# Patient Record
Sex: FEMALE | Race: WHITE | NOT HISPANIC OR LATINO | Employment: OTHER | URBAN - METROPOLITAN AREA
[De-identification: names, ages, dates, MRNs, and addresses within clinical notes are randomized per-mention and may not be internally consistent; named-entity substitution may affect disease eponyms.]

---

## 2018-05-22 ENCOUNTER — OFFICE VISIT CONVERTED (OUTPATIENT)
Dept: ORTHOPEDIC SURGERY | Facility: CLINIC | Age: 23
End: 2018-05-22
Attending: ORTHOPAEDIC SURGERY

## 2021-05-16 VITALS — HEIGHT: 60 IN | OXYGEN SATURATION: 98 % | HEART RATE: 77 BPM | BODY MASS INDEX: 20.22 KG/M2 | WEIGHT: 103 LBS

## 2022-01-08 ENCOUNTER — HOSPITAL ENCOUNTER (EMERGENCY)
Facility: HOSPITAL | Age: 27
Discharge: HOME | End: 2022-01-08
Attending: EMERGENCY MEDICINE | Admitting: EMERGENCY MEDICINE

## 2022-01-08 ENCOUNTER — APPOINTMENT (OUTPATIENT)
Dept: GENERAL RADIOLOGY | Facility: HOSPITAL | Age: 27
End: 2022-01-08

## 2022-01-08 ENCOUNTER — APPOINTMENT (OUTPATIENT)
Dept: CT IMAGING | Facility: HOSPITAL | Age: 27
End: 2022-01-08

## 2022-01-08 VITALS
DIASTOLIC BLOOD PRESSURE: 68 MMHG | RESPIRATION RATE: 18 BRPM | TEMPERATURE: 98 F | OXYGEN SATURATION: 97 % | WEIGHT: 144 LBS | HEART RATE: 70 BPM | BODY MASS INDEX: 30.23 KG/M2 | SYSTOLIC BLOOD PRESSURE: 108 MMHG | HEIGHT: 58 IN

## 2022-01-08 DIAGNOSIS — R68.89 DECREASED ACTIVITY: Primary | ICD-10-CM

## 2022-01-08 DIAGNOSIS — S09.90XA INJURY OF HEAD, INITIAL ENCOUNTER: ICD-10-CM

## 2022-01-08 LAB
ANION GAP SERPL CALCULATED.3IONS-SCNC: 8.5 MMOL/L (ref 5–15)
BACTERIA UR QL AUTO: ABNORMAL /HPF
BILIRUB UR QL STRIP: NEGATIVE
BUN SERPL-MCNC: 17 MG/DL (ref 6–20)
BUN/CREAT SERPL: 36.2 (ref 7–25)
CALCIUM SPEC-SCNC: 9.4 MG/DL (ref 8.6–10.5)
CHLORIDE SERPL-SCNC: 103 MMOL/L (ref 98–107)
CLARITY UR: ABNORMAL
CO2 SERPL-SCNC: 30.5 MMOL/L (ref 22–29)
COLOR UR: ABNORMAL
CREAT SERPL-MCNC: 0.47 MG/DL (ref 0.57–1)
DEPRECATED RDW RBC AUTO: 48.9 FL (ref 37–54)
EOSINOPHIL # BLD MANUAL: 0.08 10*3/MM3 (ref 0–0.4)
EOSINOPHIL NFR BLD MANUAL: 2 % (ref 0.3–6.2)
ERYTHROCYTE [DISTWIDTH] IN BLOOD BY AUTOMATED COUNT: 13.5 % (ref 12.3–15.4)
GFR SERPL CREATININE-BSD FRML MDRD: >150 ML/MIN/1.73
GLUCOSE SERPL-MCNC: 80 MG/DL (ref 65–99)
GLUCOSE UR STRIP-MCNC: NEGATIVE MG/DL
HCT VFR BLD AUTO: 30.7 % (ref 34–46.6)
HGB BLD-MCNC: 10.2 G/DL (ref 12–15.9)
HGB UR QL STRIP.AUTO: NEGATIVE
HYALINE CASTS UR QL AUTO: ABNORMAL /LPF
KETONES UR QL STRIP: ABNORMAL
LARGE PLATELETS: ABNORMAL
LARGE PLATELETS: NORMAL
LEUKOCYTE ESTERASE UR QL STRIP.AUTO: ABNORMAL
LYMPHOCYTES # BLD MANUAL: 2.6 10*3/MM3 (ref 0.7–3.1)
LYMPHOCYTES NFR BLD MANUAL: 10 % (ref 5–12)
MCH RBC QN AUTO: 32.2 PG (ref 26.6–33)
MCHC RBC AUTO-ENTMCNC: 33.2 G/DL (ref 31.5–35.7)
MCV RBC AUTO: 96.8 FL (ref 79–97)
MONOCYTES # BLD: 0.38 10*3/MM3 (ref 0.1–0.9)
NEUTROPHILS # BLD AUTO: 0.76 10*3/MM3 (ref 1.7–7)
NEUTROPHILS NFR BLD MANUAL: 20 % (ref 42.7–76)
NITRITE UR QL STRIP: NEGATIVE
PH UR STRIP.AUTO: 7.5 [PH] (ref 5–8)
PLATELET # BLD AUTO: 57 10*3/MM3 (ref 140–450)
PMV BLD AUTO: 11.1 FL (ref 6–12)
POTASSIUM SERPL-SCNC: 4.4 MMOL/L (ref 3.5–5.2)
PROT UR QL STRIP: ABNORMAL
RBC # BLD AUTO: 3.17 10*6/MM3 (ref 3.77–5.28)
RBC # UR STRIP: ABNORMAL /HPF
RBC MORPH BLD: NORMAL
REF LAB TEST METHOD: ABNORMAL
SCAN SLIDE: NORMAL
SMALL PLATELETS BLD QL SMEAR: ABNORMAL
SMALL PLATELETS BLD QL SMEAR: NORMAL
SODIUM SERPL-SCNC: 142 MMOL/L (ref 136–145)
SP GR UR STRIP: >1.03 (ref 1–1.03)
SQUAMOUS #/AREA URNS HPF: ABNORMAL /HPF
TRI-PHOS CRY URNS QL MICRO: ABNORMAL /HPF
UROBILINOGEN UR QL STRIP: ABNORMAL
VALPROATE SERPL-MCNC: 101.3 MCG/ML (ref 50–125)
VARIANT LYMPHS NFR BLD MANUAL: 68 % (ref 19.6–45.3)
WBC # UR STRIP: ABNORMAL /HPF
WBC MORPH BLD: NORMAL
WBC NRBC COR # BLD: 3.82 10*3/MM3 (ref 3.4–10.8)

## 2022-01-08 PROCEDURE — 36415 COLL VENOUS BLD VENIPUNCTURE: CPT

## 2022-01-08 PROCEDURE — 81001 URINALYSIS AUTO W/SCOPE: CPT | Performed by: NURSE PRACTITIONER

## 2022-01-08 PROCEDURE — U0004 COV-19 TEST NON-CDC HGH THRU: HCPCS | Performed by: NURSE PRACTITIONER

## 2022-01-08 PROCEDURE — 71045 X-RAY EXAM CHEST 1 VIEW: CPT

## 2022-01-08 PROCEDURE — 99283 EMERGENCY DEPT VISIT LOW MDM: CPT

## 2022-01-08 PROCEDURE — 85025 COMPLETE CBC W/AUTO DIFF WBC: CPT | Performed by: NURSE PRACTITIONER

## 2022-01-08 PROCEDURE — 80164 ASSAY DIPROPYLACETIC ACD TOT: CPT | Performed by: NURSE PRACTITIONER

## 2022-01-08 PROCEDURE — 80048 BASIC METABOLIC PNL TOTAL CA: CPT | Performed by: NURSE PRACTITIONER

## 2022-01-08 PROCEDURE — 70450 CT HEAD/BRAIN W/O DYE: CPT

## 2022-01-08 PROCEDURE — 85007 BL SMEAR W/DIFF WBC COUNT: CPT | Performed by: NURSE PRACTITIONER

## 2022-01-08 RX ORDER — METHOCARBAMOL 750 MG/1
750 TABLET, FILM COATED ORAL 2 TIMES DAILY PRN
COMMUNITY
End: 2022-06-21

## 2022-01-08 RX ORDER — DOCUSATE SODIUM 100 MG/1
100 CAPSULE, LIQUID FILLED ORAL 2 TIMES DAILY
COMMUNITY
End: 2022-06-21

## 2022-01-08 RX ORDER — DIVALPROEX SODIUM 500 MG/1
500 TABLET, DELAYED RELEASE ORAL 3 TIMES DAILY
COMMUNITY
End: 2022-06-21 | Stop reason: DRUGHIGH

## 2022-01-08 RX ORDER — ARIPIPRAZOLE 5 MG/1
5 TABLET ORAL DAILY
COMMUNITY
End: 2022-07-20 | Stop reason: SDUPTHER

## 2022-01-08 RX ORDER — ACETAMINOPHEN 500 MG
1000 TABLET ORAL EVERY 8 HOURS PRN
COMMUNITY

## 2022-01-08 RX ORDER — LEVOTHYROXINE SODIUM 0.07 MG/1
75 TABLET ORAL DAILY
COMMUNITY

## 2022-01-08 RX ORDER — LEVETIRACETAM 500 MG/1
1500 TABLET, EXTENDED RELEASE ORAL 2 TIMES DAILY
COMMUNITY

## 2022-01-08 RX ORDER — CLONAZEPAM 0.5 MG/1
0.5 TABLET ORAL AS NEEDED
COMMUNITY
End: 2022-06-21

## 2022-01-08 RX ORDER — CITALOPRAM 20 MG/1
20 TABLET ORAL DAILY
COMMUNITY
End: 2022-06-21

## 2022-01-08 RX ORDER — DIVALPROEX SODIUM 250 MG/1
750 TABLET, EXTENDED RELEASE ORAL 2 TIMES DAILY
COMMUNITY

## 2022-01-08 RX ORDER — MELOXICAM 7.5 MG/1
7.5 TABLET ORAL DAILY
COMMUNITY
End: 2022-06-21

## 2022-01-08 RX ORDER — CANNABIDIOL 100 MG/ML
1 SOLUTION ORAL 2 TIMES DAILY
COMMUNITY
End: 2022-06-21

## 2022-01-08 RX ORDER — OLOPATADINE HYDROCHLORIDE 2 MG/ML
1 SOLUTION/ DROPS OPHTHALMIC DAILY
COMMUNITY
End: 2022-06-21

## 2022-01-08 RX ORDER — CLOBAZAM 10 MG/1
TABLET ORAL TAKE AS DIRECTED
COMMUNITY

## 2022-01-09 LAB — SARS-COV-2 RNA PNL SPEC NAA+PROBE: NOT DETECTED

## 2022-01-09 NOTE — ED PROVIDER NOTES
"Juan Dorado is a 26-year-old female with congenital malfunctions that presents today with her caregivers for fatigue and decreased activity for the last few days. They state that the patient typically ambulates by herself but in the last few days has had decreased strength with ambulating. They report last night she apparently got up in the middle of the night and fell and hit her head. She is not on any blood thinners. They report a history of frequent falls. Patient denies any pain today other than her head which she wears a helmet 24/7. She states her head pain today is \"a lot.\"          Review of Systems   Constitutional: Positive for activity change and fatigue.   Neurological: Positive for headaches.   All other systems reviewed and are negative.      No past medical history on file.    Allergies   Allergen Reactions   • Versed [Midazolam] Unknown - High Severity       No past surgical history on file.    No family history on file.    Social History     Socioeconomic History   • Marital status: Single           Objective   Physical Exam  Vitals and nursing note reviewed.   HENT:      Mouth/Throat:      Mouth: Mucous membranes are moist.   Eyes:      Pupils: Pupils are equal, round, and reactive to light.   Cardiovascular:      Rate and Rhythm: Normal rate and regular rhythm.      Pulses: Normal pulses.      Heart sounds: Normal heart sounds.   Pulmonary:      Effort: Pulmonary effort is normal.      Breath sounds: Normal breath sounds.   Abdominal:      General: Abdomen is flat. Bowel sounds are normal.      Palpations: Abdomen is soft.   Musculoskeletal:         General: Normal range of motion.      Cervical back: Normal range of motion and neck supple.   Skin:     General: Skin is warm and dry.   Neurological:      Mental Status: She is alert. Mental status is at baseline.   Psychiatric:         Mood and Affect: Mood normal.         Procedures           ED Course                                       "           MDM  Number of Diagnoses or Management Options  Diagnosis management comments: Seen and assessed patient as noted. Vital stable, no acute distress, afebrile. Patient has baseline per caregivers as far as neurologically.    Full work-up shows no emergent findings today. Explained this to the caregivers and we decided on looking into physical therapy options with PCP. I feel patient is safe to discharge home at this time and educated them on worrisome symptoms to follow-up for and they verbalized understanding.       Amount and/or Complexity of Data Reviewed  Clinical lab tests: reviewed and ordered  Tests in the radiology section of CPT®: reviewed and ordered    Risk of Complications, Morbidity, and/or Mortality  Presenting problems: moderate  Diagnostic procedures: moderate  Management options: moderate    Patient Progress  Patient progress: stable      Final diagnoses:   Decreased activity   Injury of head, initial encounter       ED Disposition  ED Disposition     ED Disposition Condition Comment    Discharge Stable           Lily Cardoso, APRN  [address] [address]  [phone]    Call   physical therapy         Medication List      No changes were made to your prescriptions during this visit.          Kayli Ayala, KATE  01/08/22 1933

## 2022-06-21 ENCOUNTER — OFFICE VISIT (OUTPATIENT)
Dept: PSYCHIATRY | Facility: CLINIC | Age: 27
End: 2022-06-21

## 2022-06-21 VITALS
RESPIRATION RATE: 16 BRPM | HEART RATE: 89 BPM | OXYGEN SATURATION: 99 % | BODY MASS INDEX: 23.13 KG/M2 | SYSTOLIC BLOOD PRESSURE: 115 MMHG | HEIGHT: 60 IN | WEIGHT: 117.8 LBS | TEMPERATURE: 97.1 F | DIASTOLIC BLOOD PRESSURE: 88 MMHG

## 2022-06-21 DIAGNOSIS — G80.9 CEREBRAL PALSY, UNSPECIFIED TYPE: ICD-10-CM

## 2022-06-21 DIAGNOSIS — F91.9 BEHAVIOR DISTURBANCE: Primary | ICD-10-CM

## 2022-06-21 DIAGNOSIS — G40.909 SEIZURE DISORDER: ICD-10-CM

## 2022-06-21 DIAGNOSIS — F79 INTELLECTUAL DISABILITY: ICD-10-CM

## 2022-06-21 PROCEDURE — 99204 OFFICE O/P NEW MOD 45 MIN: CPT | Performed by: NURSE PRACTITIONER

## 2022-06-21 RX ORDER — POLYETHYLENE GLYCOL 3350 17 G/17G
17 POWDER, FOR SOLUTION ORAL DAILY
COMMUNITY

## 2022-06-21 RX ORDER — LEVOTHYROXINE SODIUM 0.07 MG/1
75 TABLET ORAL
COMMUNITY
Start: 2022-02-17 | End: 2023-01-24 | Stop reason: SDUPTHER

## 2022-06-21 RX ORDER — FERROUS SULFATE 325(65) MG
325 TABLET ORAL
COMMUNITY

## 2022-06-21 RX ORDER — MULTIPLE VITAMINS W/ MINERALS TAB 9MG-400MCG
1 TAB ORAL DAILY
COMMUNITY

## 2022-06-21 RX ORDER — LEVONORGESTREL / ETHINYL ESTRADIOL AND ETHINYL ESTRADIOL 150-30(84)
1 KIT ORAL DAILY
COMMUNITY
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
## 2022-06-22 PROBLEM — H54.40 BLIND LEFT EYE: Status: ACTIVE | Noted: 2022-06-22

## 2022-06-22 RX ORDER — QUETIAPINE FUMARATE 25 MG/1
TABLET, FILM COATED ORAL
Qty: 30 TABLET | Refills: 1 | Status: SHIPPED | OUTPATIENT
Start: 2022-06-22 | End: 2022-07-20 | Stop reason: SDUPTHER

## 2022-07-12 ENCOUNTER — OFFICE VISIT (OUTPATIENT)
Dept: CARDIOLOGY | Facility: CLINIC | Age: 27
End: 2022-07-12

## 2022-07-12 VITALS
BODY MASS INDEX: 25.2 KG/M2 | OXYGEN SATURATION: 95 % | DIASTOLIC BLOOD PRESSURE: 88 MMHG | SYSTOLIC BLOOD PRESSURE: 120 MMHG | HEART RATE: 86 BPM | HEIGHT: 57 IN | WEIGHT: 116.8 LBS

## 2022-07-12 DIAGNOSIS — R60.0 BILATERAL LOWER EXTREMITY EDEMA: ICD-10-CM

## 2022-07-12 DIAGNOSIS — R06.02 SHORTNESS OF BREATH: Primary | ICD-10-CM

## 2022-07-12 PROCEDURE — 93000 ELECTROCARDIOGRAM COMPLETE: CPT | Performed by: PHYSICIAN ASSISTANT

## 2022-07-12 PROCEDURE — 99203 OFFICE O/P NEW LOW 30 MIN: CPT | Performed by: PHYSICIAN ASSISTANT

## 2022-07-12 RX ORDER — FUROSEMIDE 20 MG/1
20 TABLET ORAL DAILY PRN
Qty: 30 TABLET | Refills: 2 | Status: SHIPPED | OUTPATIENT
Start: 2022-07-12

## 2022-07-12 RX ORDER — POTASSIUM CHLORIDE 750 MG/1
10 TABLET, FILM COATED, EXTENDED RELEASE ORAL DAILY PRN
Qty: 30 TABLET | Refills: 2 | Status: SHIPPED | OUTPATIENT
Start: 2022-07-12

## 2022-07-12 RX ORDER — LACTULOSE 10 G/15ML
20 SOLUTION ORAL 2 TIMES DAILY PRN
COMMUNITY

## 2022-07-12 RX ORDER — CITALOPRAM 20 MG/1
20 TABLET ORAL DAILY
COMMUNITY
End: 2022-07-20 | Stop reason: SDUPTHER

## 2022-07-12 RX ORDER — BISACODYL 5 MG/1
5 TABLET, DELAYED RELEASE ORAL DAILY PRN
COMMUNITY

## 2022-07-12 NOTE — PROGRESS NOTES
Subjective   Ave Correia is a 27 y.o. female     Chief Complaint   Patient presents with   • Establish Care     Records in chart / PT NON-VERBAL    • Chest Pain   • Shortness of Breath     HPI:    The patient is a 27-year-old female who presents today with caretakers for further evaluation from cardiovascular standpoint primarily given edema and increasing dyspnea.  The patient is currently in a long-term home where she is housed with caretakers.  Apparently there is report of mild intellectual disability, epilepsy, partial blind status, unsteady gait, vagus nerve stimulator, and numerous other issues as outlined below.  The caretakers are with the patient today who help give the majority of her symptoms and history.  There has been slight increase in dyspnea over the last few weeks.  There is also concerned because of slight increase in lower extremity edema.  The patient is unable to give any significant history.  There is questionable episodes of chest discomfort at times by caretakers report, just based on the patient's actions.  She occasionally will grasp her chest.  There has been no mention or notice of PND orthopnea by caretakers report.  After noticing the above symptoms, they have requested evaluation from cardiovascular standpoint and presents today in that setting.      Current Outpatient Medications   Medication Sig Dispense Refill   • acetaminophen (TYLENOL) 500 MG tablet Take 1,000 mg by mouth Every 8 (Eight) Hours As Needed for Mild Pain .     • bisacodyl (DULCOLAX) 5 MG EC tablet Take 5 mg by mouth Daily As Needed for Constipation.     • cloBAZam (ONFI) 10 MG tablet Take 35 mg by mouth Every Night.     • divalproex (DEPAKOTE ER) 250 MG 24 hr tablet Take 750 mg by mouth 2 (Two) Times a Day.     • ferrous sulfate 325 (65 FE) MG tablet Take 325 mg by mouth Daily With Breakfast.     • lactulose (CHRONULAC) 10 GM/15ML solution Take 20 g by mouth 2 (Two) Times a Day As Needed.     • levETIRAcetam  (KEPPRA XR) 750 MG tablet sustained-release 24 hour tablet Take 1,500 mg by mouth 2 (Two) Times a Day.     • Levonorgest-Eth Estrad 91-Day 0.15-0.03 &0.01 MG tablet Take 1 tablet by mouth Daily.     • levothyroxine (SYNTHROID, LEVOTHROID) 75 MCG tablet Take 75 mcg by mouth Daily.     • multivitamin with minerals tablet tablet Take 1 tablet by mouth Daily.     • polyethylene glycol (MIRALAX) 17 g packet Take 17 g by mouth Daily.     • ARIPiprazole (ABILIFY) 5 MG tablet Take 1 tablet by mouth Daily. 30 tablet 1   • citalopram (CeleXA) 20 MG tablet Take 1 tablet by mouth Daily. 30 tablet 1   • furosemide (LASIX) 20 MG tablet Take 1 tablet by mouth Daily As Needed (Edema). 30 tablet 2   • levothyroxine (SYNTHROID, LEVOTHROID) 75 MCG tablet 75 mcg.     • potassium chloride 10 MEQ CR tablet Take 1 tablet by mouth Daily As Needed (when taking Furosemide). 30 tablet 2   • QUEtiapine (SEROquel) 100 MG tablet Take 1 tablet by mouth Every Night. 30 tablet 1     No current facility-administered medications for this visit.       Versed [midazolam]    Past Medical History:   Diagnosis Date   • Blind right eye    • Cerebral palsy (HCC)    • Hemiparesis of right dominant side due to non-cerebrovascular etiology (HCC)    • Seizures (HCC)        Social History     Socioeconomic History   • Marital status: Single   Tobacco Use   • Smoking status: Never Smoker   • Smokeless tobacco: Never Used   Vaping Use   • Vaping Use: Never used   Substance and Sexual Activity   • Alcohol use: Never   • Drug use: Never       History reviewed. No pertinent family history.    Review of Systems   Constitutional: Positive for chills. Negative for fatigue and fever.   HENT: Positive for rhinorrhea. Negative for congestion and sore throat.    Eyes: Negative.  Negative for visual disturbance.   Respiratory: Positive for chest tightness and shortness of breath. Negative for wheezing.    Cardiovascular: Positive for chest pain and leg swelling. Negative for  "palpitations.   Gastrointestinal: Negative.    Endocrine: Negative.    Genitourinary: Negative.    Musculoskeletal: Positive for back pain. Negative for arthralgias and neck pain.   Skin: Negative.  Negative for rash and wound.   Allergic/Immunologic: Positive for environmental allergies.   Neurological: Negative.  Negative for dizziness, weakness, numbness and headaches.   Hematological: Negative.  Does not bruise/bleed easily.   Psychiatric/Behavioral: Negative.  Negative for sleep disturbance.       Objective     Vitals:    22 0847   BP: 120/88   BP Location: Left arm   Patient Position: Sitting   Pulse: 86   SpO2: 95%   Weight: 53 kg (116 lb 12.8 oz)   Height: 144.8 cm (57\")        /88 (BP Location: Left arm, Patient Position: Sitting)   Pulse 86   Ht 144.8 cm (57\")   Wt 53 kg (116 lb 12.8 oz)   SpO2 95%   BMI 25.28 kg/m²      Lab Results (most recent)     None          Physical Exam  Vitals and nursing note reviewed.   Constitutional:       General: She is not in acute distress.     Appearance: She is well-developed.   HENT:      Head: Normocephalic and atraumatic.   Eyes:      Conjunctiva/sclera: Conjunctivae normal.      Pupils: Pupils are equal, round, and reactive to light.   Neck:      Vascular: No JVD.      Trachea: No tracheal deviation.   Cardiovascular:      Rate and Rhythm: Normal rate and regular rhythm.      Heart sounds: Normal heart sounds.   Pulmonary:      Effort: Pulmonary effort is normal.      Breath sounds: Normal breath sounds.   Abdominal:      General: Bowel sounds are normal. There is no distension.      Palpations: Abdomen is soft. There is no mass.      Tenderness: There is no abdominal tenderness. There is no guarding or rebound.   Musculoskeletal:         General: No tenderness or deformity. Normal range of motion.      Cervical back: Normal range of motion and neck supple.      Right lower le+ Edema present.      Left lower le+ Edema present.   Skin:     " General: Skin is warm and dry.      Coloration: Skin is not pale.      Findings: No erythema or rash.   Neurological:      Mental Status: She is alert and oriented to person, place, and time.   Psychiatric:         Behavior: Behavior normal.         Thought Content: Thought content normal.         Judgment: Judgment normal.         Procedure     ECG 12 Lead    Date/Time: 7/12/2022 8:49 AM  Performed by: Michael Dove PA  Authorized by: Michael Dove PA   Comparison: not compared with previous ECG   Comments: Sinus rhythm, rate 76, normal axis, no acute changes noted.  Overall, the patient has low voltage.                 Assessment & Plan      Diagnosis Plan   1. Shortness of breath  Adult Transthoracic Echo Complete W/ Cont if Necessary Per Protocol   2. Bilateral lower extremity edema  Adult Transthoracic Echo Complete W/ Cont if Necessary Per Protocol     1.  The patient is brought to the clinic today with the help of caregivers, all as outlined above.  The main concern at this time is with slight increase in edema and with increasing dyspnea at baseline.  The patient is unable to give any significant history.    2.  I would like to schedule for an echocardiogram.  We can evaluate LV size and function, valvular morphologies, and cardiac structure otherwise.    3.  We did give her low-dose Lasix with potassium supplementation.  I have reviewed in detail with caregivers how to titrate that as needed for edema or clinical scenario otherwise.    4.  I would make no further adjustments in medications for now.    5.  Once we know results of echo we can recommend the patient further.  If diuretic therapy is not successful, caregivers will call the clinic immediately.    6.  I have requested all recent laboratories.  Further pending all the above.                    Electronically signed by:

## 2022-07-14 ENCOUNTER — PATIENT ROUNDING (BHMG ONLY) (OUTPATIENT)
Dept: CARDIOLOGY | Facility: CLINIC | Age: 27
End: 2022-07-14

## 2022-07-14 NOTE — PROGRESS NOTES
July 14, 2022    Hello, may I speak with Ave Correia?    My name is Katy    I am  with [facility]  [address] [place] [id number] [phone].    Before we get started may I verify your date of birth? 1995    I am calling to officially welcome you to our practice and ask about your recent visit. Is this a good time to talk? NO WE ARE GETTING READY TO ORDER DINNER.     Tell me about your visit with us. What things went well?        We're always looking for ways to make our patients' experiences even better. Do you have recommendations on ways we may improve?     Overall were you satisfied with your first visit to our practice?       I appreciate you taking the time to speak with me today. Is there anything else I can do for you?      Thank you, and have a great day.       Report received from KARNY Gold  Pt resting comfortably   Awaiting medicine bed upstairs  1 unit PRBC given. Consent in chart. Risks and benefits explained to patient. Patient verbalized understanding of risks and benefits. Patient aware of possible side effects. Vital signs stable. Second RN at bedside for confirmation.   Safety maintained at all times, bed in lowest position, call bell in hand.  Will continue to monitor closely.

## 2022-07-20 ENCOUNTER — OFFICE VISIT (OUTPATIENT)
Dept: PSYCHIATRY | Facility: CLINIC | Age: 27
End: 2022-07-20

## 2022-07-20 VITALS
BODY MASS INDEX: 24.53 KG/M2 | WEIGHT: 113.69 LBS | OXYGEN SATURATION: 100 % | SYSTOLIC BLOOD PRESSURE: 108 MMHG | HEIGHT: 57 IN | HEART RATE: 78 BPM | TEMPERATURE: 97.3 F | DIASTOLIC BLOOD PRESSURE: 78 MMHG

## 2022-07-20 DIAGNOSIS — G40.909 SEIZURE DISORDER: ICD-10-CM

## 2022-07-20 DIAGNOSIS — F91.9 BEHAVIOR DISTURBANCE: ICD-10-CM

## 2022-07-20 DIAGNOSIS — F79 INTELLECTUAL DISABILITY: ICD-10-CM

## 2022-07-20 DIAGNOSIS — G80.9 CEREBRAL PALSY, UNSPECIFIED TYPE: Primary | ICD-10-CM

## 2022-07-20 PROCEDURE — 99214 OFFICE O/P EST MOD 30 MIN: CPT | Performed by: NURSE PRACTITIONER

## 2022-07-20 RX ORDER — CITALOPRAM 20 MG/1
20 TABLET ORAL DAILY
Qty: 30 TABLET | Refills: 1 | Status: SHIPPED | OUTPATIENT
Start: 2022-07-20 | End: 2022-08-25 | Stop reason: SDUPTHER

## 2022-07-20 RX ORDER — QUETIAPINE FUMARATE 100 MG/1
100 TABLET, FILM COATED ORAL NIGHTLY
Qty: 30 TABLET | Refills: 1 | Status: SHIPPED | OUTPATIENT
Start: 2022-07-20 | End: 2022-08-25 | Stop reason: SDUPTHER

## 2022-07-20 RX ORDER — ARIPIPRAZOLE 5 MG/1
5 TABLET ORAL DAILY
Qty: 30 TABLET | Refills: 1 | Status: SHIPPED | OUTPATIENT
Start: 2022-07-20 | End: 2022-08-25 | Stop reason: SDUPTHER

## 2022-08-15 ENCOUNTER — HOSPITAL ENCOUNTER (OUTPATIENT)
Dept: CARDIOLOGY | Facility: HOSPITAL | Age: 27
Discharge: HOME | End: 2022-08-15
Admitting: PHYSICIAN ASSISTANT

## 2022-08-15 DIAGNOSIS — R60.0 BILATERAL LOWER EXTREMITY EDEMA: ICD-10-CM

## 2022-08-15 DIAGNOSIS — R06.02 SHORTNESS OF BREATH: ICD-10-CM

## 2022-08-15 PROCEDURE — 93306 TTE W/DOPPLER COMPLETE: CPT | Performed by: INTERNAL MEDICINE

## 2022-08-15 PROCEDURE — 93306 TTE W/DOPPLER COMPLETE: CPT

## 2022-08-24 LAB
AORTIC DIMENSIONLESS INDEX: 0.9 (DI)
BH CV ECHO MEAS - ACS: 2.02 CM
BH CV ECHO MEAS - AO MAX PG: 5.1 MMHG
BH CV ECHO MEAS - AO MEAN PG: 2.7 MMHG
BH CV ECHO MEAS - AO ROOT DIAM: 2.7 CM
BH CV ECHO MEAS - AO V2 MAX: 112.7 CM/SEC
BH CV ECHO MEAS - AO V2 VTI: 26.5 CM
BH CV ECHO MEAS - AVA(I,D): 2.23 CM2
BH CV ECHO MEAS - EDV(CUBED): 56.2 ML
BH CV ECHO MEAS - EDV(MOD-SP4): 40.8 ML
BH CV ECHO MEAS - EF(MOD-SP4): 60.5 %
BH CV ECHO MEAS - EF_3D-VOL: 62 %
BH CV ECHO MEAS - ESV(CUBED): 12.2 ML
BH CV ECHO MEAS - ESV(MOD-SP4): 16.1 ML
BH CV ECHO MEAS - FS: 39.9 %
BH CV ECHO MEAS - IVS/LVPW: 0.81 CM
BH CV ECHO MEAS - IVSD: 0.68 CM
BH CV ECHO MEAS - LA DIMENSION: 2.46 CM
BH CV ECHO MEAS - LAT PEAK E' VEL: 15.6 CM/SEC
BH CV ECHO MEAS - LV DIASTOLIC VOL/BSA (35-75): 28.9 CM2
BH CV ECHO MEAS - LV MASS(C)D: 81.2 GRAMS
BH CV ECHO MEAS - LV MAX PG: 3.9 MMHG
BH CV ECHO MEAS - LV MEAN PG: 1.91 MMHG
BH CV ECHO MEAS - LV SYSTOLIC VOL/BSA (12-30): 11.4 CM2
BH CV ECHO MEAS - LV V1 MAX: 99 CM/SEC
BH CV ECHO MEAS - LV V1 VTI: 20.5 CM
BH CV ECHO MEAS - LVIDD: 3.8 CM
BH CV ECHO MEAS - LVIDS: 2.3 CM
BH CV ECHO MEAS - LVOT AREA: 2.9 CM2
BH CV ECHO MEAS - LVOT DIAM: 1.91 CM
BH CV ECHO MEAS - LVPWD: 0.84 CM
BH CV ECHO MEAS - MED PEAK E' VEL: 12.1 CM/SEC
BH CV ECHO MEAS - MV A MAX VEL: 45.4 CM/SEC
BH CV ECHO MEAS - MV DEC SLOPE: 423.5 CM/SEC2
BH CV ECHO MEAS - MV E MAX VEL: 89.1 CM/SEC
BH CV ECHO MEAS - MV E/A: 1.96
BH CV ECHO MEAS - MV MAX PG: 5 MMHG
BH CV ECHO MEAS - MV MEAN PG: 1.99 MMHG
BH CV ECHO MEAS - MV P1/2T: 66.7 MSEC
BH CV ECHO MEAS - MV V2 VTI: 25.1 CM
BH CV ECHO MEAS - MVA(P1/2T): 3.3 CM2
BH CV ECHO MEAS - MVA(VTI): 2.35 CM2
BH CV ECHO MEAS - PA V2 MAX: 91.8 CM/SEC
BH CV ECHO MEAS - RAP SYSTOLE: 10 MMHG
BH CV ECHO MEAS - RV MAX PG: 1.44 MMHG
BH CV ECHO MEAS - RV V1 MAX: 60.1 CM/SEC
BH CV ECHO MEAS - RV V1 VTI: 14 CM
BH CV ECHO MEAS - RVDD: 1.73 CM
BH CV ECHO MEAS - RVSP: 30.1 MMHG
BH CV ECHO MEAS - SI(MOD-SP4): 17.5 ML/M2
BH CV ECHO MEAS - SV(LVOT): 58.9 ML
BH CV ECHO MEAS - SV(MOD-SP4): 24.7 ML
BH CV ECHO MEAS - TR MAX PG: 20.1 MMHG
BH CV ECHO MEAS - TR MAX VEL: 224 CM/SEC
BH CV ECHO MEASUREMENTS AVERAGE E/E' RATIO: 6.43
LEFT ATRIUM VOLUME INDEX: 8.7 ML/M2
MAXIMAL PREDICTED HEART RATE: 193 BPM
STRESS TARGET HR: 164 BPM

## 2022-08-25 ENCOUNTER — TELEMEDICINE (OUTPATIENT)
Dept: PSYCHIATRY | Facility: CLINIC | Age: 27
End: 2022-08-25

## 2022-08-25 ENCOUNTER — TELEPHONE (OUTPATIENT)
Dept: CARDIOLOGY | Facility: CLINIC | Age: 27
End: 2022-08-25

## 2022-08-25 DIAGNOSIS — G80.9 CEREBRAL PALSY, UNSPECIFIED TYPE: Primary | ICD-10-CM

## 2022-08-25 DIAGNOSIS — F91.9 BEHAVIOR DISTURBANCE: ICD-10-CM

## 2022-08-25 DIAGNOSIS — G40.909 SEIZURE DISORDER: ICD-10-CM

## 2022-08-25 DIAGNOSIS — F79 INTELLECTUAL DISABILITY: ICD-10-CM

## 2022-08-25 PROCEDURE — 99214 OFFICE O/P EST MOD 30 MIN: CPT | Performed by: NURSE PRACTITIONER

## 2022-08-25 RX ORDER — QUETIAPINE FUMARATE 100 MG/1
150 TABLET, FILM COATED ORAL NIGHTLY
Qty: 45 TABLET | Refills: 1 | Status: SHIPPED | OUTPATIENT
Start: 2022-08-25 | End: 2022-10-06 | Stop reason: SDUPTHER

## 2022-08-25 RX ORDER — ARIPIPRAZOLE 5 MG/1
5 TABLET ORAL DAILY
Qty: 30 TABLET | Refills: 1 | Status: SHIPPED | OUTPATIENT
Start: 2022-08-25 | End: 2022-10-06 | Stop reason: SDUPTHER

## 2022-08-25 RX ORDER — LEVETIRACETAM 500 MG/1
TABLET ORAL
COMMUNITY
Start: 2022-08-24 | End: 2023-01-24 | Stop reason: SDUPTHER

## 2022-08-25 RX ORDER — DIVALPROEX SODIUM 250 MG/1
TABLET, DELAYED RELEASE ORAL AS NEEDED
COMMUNITY
Start: 2022-08-24

## 2022-08-25 RX ORDER — CITALOPRAM 20 MG/1
20 TABLET ORAL DAILY
Qty: 30 TABLET | Refills: 1 | Status: SHIPPED | OUTPATIENT
Start: 2022-08-25 | End: 2022-10-06 | Stop reason: SDUPTHER

## 2022-08-25 NOTE — PROGRESS NOTES
"      Subjective   Ave Correia is a 27 y.o. female This was an audio and video enabled telemedicine encounter.Pt is being seen today via telemed through My Chart.  Provider is located at the office [address] [place].  Patient is located at home. Historian is rere.  .  Chief Complaint:  Recheck on behaviors and sleep    History of Present Illness: Guardian states that patient has seen to make a slight improvement on the Seroquel.  She says that overall she feels the Seroquel has helped with patient being more calm and kind as well as actually helpful at times.  She is sleeping now until 4 AM to 430.  They are keeping her up most nights until 10 PM.  She is still getting up at 4 AM though and still gets up and down several times a night.  She is also had addition of seizure medications.  See medication list.  She is currently having 1-2 seizures a week of the \"real\" seizures and states that she will have pseudoseizures up to daily depending upon her mood.  She has acted out some more in the last couple of days.  She will urinate on herself when she is mad at the caregiver and urinated in their chair this past week.  She does not exhibit any depressive symptoms or anxiety symptoms.no known weight gain or appetite changes.      The following portions of the patient's history were reviewed and updated as appropriate: allergies, current medications, past family history, past medical history, past social history, past surgical history and problem list.    Review of Systems   Constitutional: Negative for activity change, appetite change and fatigue.   HENT: Negative.    Eyes: Negative for visual disturbance.   Respiratory: Negative.    Cardiovascular: Negative.    Gastrointestinal: Negative for nausea.   Endocrine: Negative.    Genitourinary: Negative.    Musculoskeletal: Negative for arthralgias.   Skin: Negative.    Allergic/Immunologic: Negative.    Neurological: Positive for seizures. Negative for dizziness " and headaches.   Hematological: Negative.    Psychiatric/Behavioral: Positive for agitation, behavioral problems, decreased concentration and sleep disturbance. Negative for confusion, dysphoric mood, hallucinations, self-injury and suicidal ideas. The patient is not nervous/anxious and is not hyperactive.        Objective   Physical Exam  Psychiatric:         Attention and Perception: She is inattentive.         Mood and Affect: Affect is blunt.         Behavior: Behavior is slowed.         Cognition and Memory: Cognition is impaired.         Judgment: Judgment is impulsive.      Comments: Awake.  Has helmet on.  Would lay head over on table.  Would not answer questions       There were no vitals taken for this visit.    Medication List:   Current Outpatient Medications   Medication Sig Dispense Refill   • ARIPiprazole (ABILIFY) 5 MG tablet Take 1 tablet by mouth Daily. 30 tablet 1   • citalopram (CeleXA) 20 MG tablet Take 1 tablet by mouth Daily. 30 tablet 1   • QUEtiapine (SEROquel) 100 MG tablet Take 1.5 tablets by mouth Every Night. 45 tablet 1   • acetaminophen (TYLENOL) 500 MG tablet Take 1,000 mg by mouth Every 8 (Eight) Hours As Needed for Mild Pain .     • bisacodyl (DULCOLAX) 5 MG EC tablet Take 5 mg by mouth Daily As Needed for Constipation.     • cloBAZam (ONFI) 10 MG tablet Take 35 mg by mouth Every Night.     • divalproex (DEPAKOTE ER) 250 MG 24 hr tablet Take 750 mg by mouth 2 (Two) Times a Day.     • divalproex (DEPAKOTE) 250 MG DR tablet      • ferrous sulfate 325 (65 FE) MG tablet Take 325 mg by mouth Daily With Breakfast.     • furosemide (LASIX) 20 MG tablet Take 1 tablet by mouth Daily As Needed (Edema). 30 tablet 2   • lactulose (CHRONULAC) 10 GM/15ML solution Take 20 g by mouth 2 (Two) Times a Day As Needed.     • levETIRAcetam (KEPPRA XR) 750 MG tablet sustained-release 24 hour tablet Take 1,500 mg by mouth 2 (Two) Times a Day.     • levETIRAcetam (KEPPRA) 500 MG tablet      •  Levonorgest-Eth Estrad 91-Day 0.15-0.03 &0.01 MG tablet Take 1 tablet by mouth Daily.     • levothyroxine (SYNTHROID, LEVOTHROID) 75 MCG tablet Take 75 mcg by mouth Daily.     • levothyroxine (SYNTHROID, LEVOTHROID) 75 MCG tablet 75 mcg.     • multivitamin with minerals tablet tablet Take 1 tablet by mouth Daily.     • polyethylene glycol (MIRALAX) 17 g packet Take 17 g by mouth Daily.     • potassium chloride 10 MEQ CR tablet Take 1 tablet by mouth Daily As Needed (when taking Furosemide). 30 tablet 2     No current facility-administered medications for this visit.       Mental Status Exam:   Hygiene:   good  Cooperation:  Evasive  Eye Contact:  Poor  Psychomotor Behavior:  Slow  Affect:  Blunted  Hopelessness: Denies  Speech:  minimal  Thought Process:  Unable to demonstrate  Thought Content:  Unable to demonstrate  Suicidal:  None  Homicidal:  None  Hallucinations:  None  Delusion:  None  Memory:  Unable to evaluate  Orientation:  Unable to evaluate  Reliability:  poor  Insight:  Poor  Judgement:  Impaired  Impulse Control:  Poor  Physical/Medical Issues:  Yes hypothyroidism, seizure disorder    Assessment & Plan   Problems Addressed this Visit    None     Visit Diagnoses     Cerebral palsy, unspecified type (HCC)    -  Primary    Behavior disturbance        Relevant Medications    QUEtiapine (SEROquel) 100 MG tablet    citalopram (CeleXA) 20 MG tablet    ARIPiprazole (ABILIFY) 5 MG tablet    Seizure disorder (HCC)        Relevant Medications    divalproex (DEPAKOTE) 250 MG DR tablet    levETIRAcetam (KEPPRA) 500 MG tablet    Intellectual disability        Relevant Medications    QUEtiapine (SEROquel) 100 MG tablet    citalopram (CeleXA) 20 MG tablet    ARIPiprazole (ABILIFY) 5 MG tablet      Diagnoses       Codes Comments    Cerebral palsy, unspecified type (HCC)    -  Primary ICD-10-CM: G80.9  ICD-9-CM: 343.9     Behavior disturbance     ICD-10-CM: F91.9  ICD-9-CM: 312.9     Seizure disorder (HCC)      ICD-10-CM: G40.909  ICD-9-CM: 345.90     Intellectual disability     ICD-10-CM: F79  ICD-9-CM: 319         Functionality: pt having significant impairment in important areas of daily functioning.  Prognosis: Guarded dependent on medication/follow up and treatment plan compliance.    Lengthy discussion with caregiver on treatment plan.  She has made some progress.  It seems the Seroquel is also helping with behaviors so I am going to increase the Seroquel to 150 mg.  Really encouraged her to try to keep her awake until 10:00 at night.  Further plans include possibly starting to taper either Celexa or Abilify due to polypharmacy.  Do not want to change anything right now as we are still adding to the Seroquel dosage.  Caregiver verbalizes understanding of this and is in agreement.  Refills have been submitted.  She will follow up with a video visit and 6 to 8 weeks.   guardian is agreeable to call the Clinic with worsening symptoms.    guardian is aware to call 911 or go to the nearest ER should begin having SI/HI.              This document has been electronically signed by KATE Zhang on   August 25, 2022 10:00 EDT.

## 2022-08-25 NOTE — TELEPHONE ENCOUNTER
ECHO  Pt notified of no acute findings. Provider will discuss results at f/u. Pt reminded of appt date and time.  ----- Message from Rona Yee MA sent at 8/25/2022  1:30 PM EDT -----    ----- Message -----  From: Michael Dove PA  Sent: 8/24/2022   6:57 PM EDT  To: Rona Yee MA    Routine follow-up.

## 2022-10-06 ENCOUNTER — TELEMEDICINE (OUTPATIENT)
Dept: PSYCHIATRY | Facility: CLINIC | Age: 27
End: 2022-10-06

## 2022-10-06 DIAGNOSIS — F91.9 BEHAVIOR DISTURBANCE: Primary | ICD-10-CM

## 2022-10-06 DIAGNOSIS — G80.9 CEREBRAL PALSY, UNSPECIFIED TYPE: ICD-10-CM

## 2022-10-06 DIAGNOSIS — F79 INTELLECTUAL DISABILITY: ICD-10-CM

## 2022-10-06 DIAGNOSIS — G40.909 SEIZURE DISORDER: ICD-10-CM

## 2022-10-06 PROCEDURE — 99213 OFFICE O/P EST LOW 20 MIN: CPT | Performed by: NURSE PRACTITIONER

## 2022-10-06 RX ORDER — QUETIAPINE FUMARATE 100 MG/1
150 TABLET, FILM COATED ORAL NIGHTLY
Qty: 45 TABLET | Refills: 1 | Status: SHIPPED | OUTPATIENT
Start: 2022-10-06 | End: 2022-11-17 | Stop reason: SDUPTHER

## 2022-10-06 RX ORDER — ARIPIPRAZOLE 5 MG/1
5 TABLET ORAL DAILY
Qty: 30 TABLET | Refills: 1 | Status: SHIPPED | OUTPATIENT
Start: 2022-10-06 | End: 2022-11-17 | Stop reason: SDUPTHER

## 2022-10-06 RX ORDER — CITALOPRAM 20 MG/1
20 TABLET ORAL DAILY
Qty: 30 TABLET | Refills: 1 | Status: SHIPPED | OUTPATIENT
Start: 2022-10-06 | End: 2022-11-17 | Stop reason: SDUPTHER

## 2022-10-06 NOTE — PROGRESS NOTES
Subjective   Ave Correia is a 27 y.o. female This was an audio and video enabled telemedicine encounter.Pt is being seen today via telemed through My Chart.  Provider is located at the office [address] [place].  Patient is located at the day treatment. Historian is guardian.  .  Chief Complaint:  Recheck on behaviors and sleep    History of Present Illness: Guardian states that patient's behaviors have become out of control.  She states that she is having pseudoseizures all the time and will smile when someone acts as if they are concerned about her having a seizure.  Says that she has seen her neurologist who says that they have only 2 seizures documented on her device and both occurred at night.  She states that she will urinate all over the place when she does not get her way or gave an example of having clothes on that she does not like she will sit and urinate until they change them.  She is rubbing feces all over the wall.  Guardian states this is in complete defiance.  She is not having any physical aggression toward caregivers.  Guardian states that this is all affecting the 2 other people that live with them that they care for.  They state that everything is to unsanitary.  They have filed for her to be removed from their home.  They state they are having a hard time finding another place to take her.  She says that all of her's issues are behavioral and they have tried but it is affecting the other members of the household.  Patient will also leave dirty Kotex pads laying out and will laugh about it.  Caregiver states that she is sleeping from about 8:00 at night until 430 or 5 in the morning so this is an improvement.  No noticeable negative side effects to the meds.  She has not increased with weight since starting the Seroquel according to caregiver.    The following portions of the patient's history were reviewed and updated as appropriate: allergies, current medications, past family  history, past medical history, past social history, past surgical history and problem list.    Review of Systems   Constitutional: Negative for activity change, appetite change and fatigue.   HENT: Negative.    Eyes: Negative for visual disturbance.   Respiratory: Negative.    Cardiovascular: Negative.    Gastrointestinal: Negative for nausea.   Endocrine: Negative.    Genitourinary: Negative.    Musculoskeletal: Negative for arthralgias.   Skin: Negative.    Allergic/Immunologic: Negative.    Neurological: Positive for seizures. Negative for dizziness and headaches.   Hematological: Negative.    Psychiatric/Behavioral: Positive for agitation and behavioral problems. Negative for confusion, decreased concentration, dysphoric mood, hallucinations, self-injury, sleep disturbance and suicidal ideas. The patient is not nervous/anxious and is not hyperactive.          There were no vitals taken for this visit.    Medication List:   Current Outpatient Medications   Medication Sig Dispense Refill   • ARIPiprazole (ABILIFY) 5 MG tablet Take 1 tablet by mouth Daily. 30 tablet 1   • citalopram (CeleXA) 20 MG tablet Take 1 tablet by mouth Daily. 30 tablet 1   • QUEtiapine (SEROquel) 100 MG tablet Take 1.5 tablets by mouth Every Night. 45 tablet 1   • acetaminophen (TYLENOL) 500 MG tablet Take 1,000 mg by mouth Every 8 (Eight) Hours As Needed for Mild Pain .     • bisacodyl (DULCOLAX) 5 MG EC tablet Take 5 mg by mouth Daily As Needed for Constipation.     • cloBAZam (ONFI) 10 MG tablet Take 35 mg by mouth Every Night.     • divalproex (DEPAKOTE ER) 250 MG 24 hr tablet Take 750 mg by mouth 2 (Two) Times a Day.     • divalproex (DEPAKOTE) 250 MG DR tablet      • ferrous sulfate 325 (65 FE) MG tablet Take 325 mg by mouth Daily With Breakfast.     • furosemide (LASIX) 20 MG tablet Take 1 tablet by mouth Daily As Needed (Edema). 30 tablet 2   • lactulose (CHRONULAC) 10 GM/15ML solution Take 20 g by mouth 2 (Two) Times a Day As  Needed.     • levETIRAcetam (KEPPRA XR) 750 MG tablet sustained-release 24 hour tablet Take 1,500 mg by mouth 2 (Two) Times a Day.     • levETIRAcetam (KEPPRA) 500 MG tablet      • Levonorgest-Eth Estrad 91-Day 0.15-0.03 &0.01 MG tablet Take 1 tablet by mouth Daily.     • levothyroxine (SYNTHROID, LEVOTHROID) 75 MCG tablet Take 75 mcg by mouth Daily.     • levothyroxine (SYNTHROID, LEVOTHROID) 75 MCG tablet 75 mcg.     • multivitamin with minerals tablet tablet Take 1 tablet by mouth Daily.     • polyethylene glycol (MIRALAX) 17 g packet Take 17 g by mouth Daily.     • potassium chloride 10 MEQ CR tablet Take 1 tablet by mouth Daily As Needed (when taking Furosemide). 30 tablet 2     No current facility-administered medications for this visit.         Assessment & Plan   Problems Addressed this Visit    None     Visit Diagnoses     Behavior disturbance    -  Primary    Relevant Medications    QUEtiapine (SEROquel) 100 MG tablet    citalopram (CeleXA) 20 MG tablet    ARIPiprazole (ABILIFY) 5 MG tablet    Seizure disorder (HCC)        Cerebral palsy, unspecified type (HCC)        Intellectual disability        Relevant Medications    QUEtiapine (SEROquel) 100 MG tablet    citalopram (CeleXA) 20 MG tablet    ARIPiprazole (ABILIFY) 5 MG tablet      Diagnoses       Codes Comments    Behavior disturbance    -  Primary ICD-10-CM: F91.9  ICD-9-CM: 312.9     Seizure disorder (HCC)     ICD-10-CM: G40.909  ICD-9-CM: 345.90     Cerebral palsy, unspecified type (HCC)     ICD-10-CM: G80.9  ICD-9-CM: 343.9     Intellectual disability     ICD-10-CM: F79  ICD-9-CM: 319         Functionality: pt having significant impairment in important areas of daily functioning.  Prognosis: Guarded dependent on medication/follow up and treatment plan compliance.    Caregiver states that she will continue with me as her provider even if she is moved to a different home.  The future of this is uncertain.  She has an appointment scheduled back in 6  weeks in person and I told caregiver to keep that for now in case there is a new caregiver.  For now I am continuing her same medications as her issues seem to be all behavioral in nature. I am not sure if the celexa is helping at all but I am not changing anything as pt may be moving to new house and if her behaviors worsen with that I will not know if it is due to medications stoppage versus change in environment.  I am continuing the Celexa and the Abilify for behaviors.  Continuing the Seroquel for sleep.  Refills have been submitted.  Caregiver is in agreement with this.       caregiver is agreeable to call the Clinic with worsening symptoms.    caregiver is aware to call 911 or go to the nearest ER should begin having SI/HI.              This document has been electronically signed by KATE Zhang on   October 6, 2022 12:13 EDT.

## 2022-11-17 ENCOUNTER — TELEMEDICINE (OUTPATIENT)
Dept: PSYCHIATRY | Facility: CLINIC | Age: 27
End: 2022-11-17

## 2022-11-17 DIAGNOSIS — F79 INTELLECTUAL DISABILITY: ICD-10-CM

## 2022-11-17 DIAGNOSIS — F91.9 BEHAVIOR DISTURBANCE: Primary | ICD-10-CM

## 2022-11-17 DIAGNOSIS — G40.909 SEIZURE DISORDER: ICD-10-CM

## 2022-11-17 DIAGNOSIS — G80.9 CEREBRAL PALSY, UNSPECIFIED TYPE: ICD-10-CM

## 2022-11-17 PROCEDURE — 99214 OFFICE O/P EST MOD 30 MIN: CPT | Performed by: NURSE PRACTITIONER

## 2022-11-17 RX ORDER — ARIPIPRAZOLE 5 MG/1
5 TABLET ORAL DAILY
Qty: 30 TABLET | Refills: 2 | Status: SHIPPED | OUTPATIENT
Start: 2022-11-17 | End: 2023-02-16 | Stop reason: SDUPTHER

## 2022-11-17 RX ORDER — QUETIAPINE FUMARATE 100 MG/1
150 TABLET, FILM COATED ORAL NIGHTLY
Qty: 45 TABLET | Refills: 2 | Status: SHIPPED | OUTPATIENT
Start: 2022-11-17 | End: 2023-02-16 | Stop reason: SDUPTHER

## 2022-11-17 RX ORDER — CITALOPRAM 20 MG/1
20 TABLET ORAL DAILY
Qty: 30 TABLET | Refills: 2 | Status: SHIPPED | OUTPATIENT
Start: 2022-11-17 | End: 2023-02-16 | Stop reason: SDUPTHER

## 2022-11-17 NOTE — PROGRESS NOTES
Subjective   Ave Correia is a 27 y.o. female “This provider is located at [facility], [address] [place] [address]. The provider identified herself as well as her credentials.   The Patient is located at residential housing.   The patient's condition being diagnosed/treated is appropriate for telemedicine. The patient gave consent to be seen remotely, and when consent is given they understand that the consent allows for patient identifiable information to be sent to a third party as needed.   They may refuse to be seen remotely at any time. The electronic data is encrypted and password protected, and the patient has been advised of the potential risks to privacy not withstanding such measures. Historian is Ashlie with is the coordinator for the [facility].      Chief Complaint:  Recheck on behaviors and sleep    History of Present Illness: Ashlie states pt is not in residential housing with 2 other residents.  Currently there is a person who is getting training and certification and pt will be moving in with her at some point.  She states pt continues to have her negative behaviors at times.  Says she will have few good days but still has many bad days.  She will still urinate all over the floor when she gets upset.  The coordinator states that the house is currently double staffed due to patient's behavior issues.  She states that patient still has days where she refuses to bathe and shower etc.  Says the patient has had some pseudoseizures but she has not witnessed any grand mal seizures.  Patient has refused meds in the past.  She is sleeping at night however if she goes to sleep early at like 6:00 she gets up early at like 2 or 3 AM so they are trying to keep her up later so she will sleep later.  No new medical stressors.  She continues to maintain close follow-up with neurology who manages her Depakote levels.  The following portions of the patient's history were reviewed and updated as  appropriate: allergies, current medications, past family history, past medical history, past social history, past surgical history and problem list.    Review of Systems   Constitutional: Negative for activity change, appetite change and fatigue.   HENT: Negative.    Eyes: Negative for visual disturbance.   Respiratory: Negative.    Cardiovascular: Negative.    Gastrointestinal: Negative for nausea.   Endocrine: Negative.    Genitourinary: Negative.    Musculoskeletal: Positive for gait problem. Negative for arthralgias.   Skin: Negative.    Allergic/Immunologic: Negative.    Neurological: Positive for seizures and speech difficulty. Negative for dizziness and headaches.   Hematological: Negative.    Psychiatric/Behavioral: Positive for behavioral problems and sleep disturbance. Negative for agitation, confusion, decreased concentration, dysphoric mood, hallucinations, self-injury and suicidal ideas. The patient is not nervous/anxious and is not hyperactive.        Objective   Physical Exam  Vitals reviewed.   Constitutional:       Comments: She wears a helmet on her head for the drop seizures.   Musculoskeletal:      Cervical back: Normal range of motion.   Neurological:      Mental Status: She is alert.      Coordination: Coordination abnormal.      Gait: Gait abnormal.   Psychiatric:         Attention and Perception: She is inattentive.         Mood and Affect: Affect is blunt.         Behavior: Behavior is cooperative.         Cognition and Memory: Cognition is impaired.      Comments: Sits leaning forward but will sit up on command.  Face is asymmetrical. Smiles and answers simple questions.  Must be supported to walk.         There were no vitals taken for this visit.    Medication List:   Current Outpatient Medications   Medication Sig Dispense Refill   • ARIPiprazole (ABILIFY) 5 MG tablet Take 1 tablet by mouth Daily. 30 tablet 2   • citalopram (CeleXA) 20 MG tablet Take 1 tablet by mouth Daily. 30 tablet 2    • QUEtiapine (SEROquel) 100 MG tablet Take 1.5 tablets by mouth Every Night. 45 tablet 2   • acetaminophen (TYLENOL) 500 MG tablet Take 1,000 mg by mouth Every 8 (Eight) Hours As Needed for Mild Pain .     • bisacodyl (DULCOLAX) 5 MG EC tablet Take 5 mg by mouth Daily As Needed for Constipation.     • cloBAZam (ONFI) 10 MG tablet Take 35 mg by mouth Every Night.     • divalproex (DEPAKOTE ER) 250 MG 24 hr tablet Take 750 mg by mouth 2 (Two) Times a Day.     • divalproex (DEPAKOTE) 250 MG DR tablet      • ferrous sulfate 325 (65 FE) MG tablet Take 325 mg by mouth Daily With Breakfast.     • furosemide (LASIX) 20 MG tablet Take 1 tablet by mouth Daily As Needed (Edema). 30 tablet 2   • lactulose (CHRONULAC) 10 GM/15ML solution Take 20 g by mouth 2 (Two) Times a Day As Needed.     • levETIRAcetam (KEPPRA XR) 750 MG tablet sustained-release 24 hour tablet Take 1,500 mg by mouth 2 (Two) Times a Day.     • levETIRAcetam (KEPPRA) 500 MG tablet      • Levonorgest-Eth Estrad 91-Day 0.15-0.03 &0.01 MG tablet Take 1 tablet by mouth Daily.     • levothyroxine (SYNTHROID, LEVOTHROID) 75 MCG tablet Take 75 mcg by mouth Daily.     • levothyroxine (SYNTHROID, LEVOTHROID) 75 MCG tablet 75 mcg.     • multivitamin with minerals tablet tablet Take 1 tablet by mouth Daily.     • polyethylene glycol (MIRALAX) 17 g packet Take 17 g by mouth Daily.     • potassium chloride 10 MEQ CR tablet Take 1 tablet by mouth Daily As Needed (when taking Furosemide). 30 tablet 2     No current facility-administered medications for this visit.       Mental Status Exam:   Hygiene:   good  Cooperation:  Cooperative  Eye Contact:  Poor  Psychomotor Behavior:  Slow  Affect:  Blunted  Hopelessness: Denies  Speech:  Minimal  Thought Process:  Unable to demonstrate  Thought Content:  Unable to demonstrate  Suicidal:  None  Homicidal:  None  Hallucinations:  None  Delusion:  None  Memory:  Unable to evaluate  Orientation:  Unable to evaluate  Reliability:   unable to evaluate  Insight:  Poor  Judgement:  Poor  Impulse Control:  Fair  Physical/Medical Issues:  Yes cerbral palsy., hypothyroidism, seizures    Assessment & Plan   Problems Addressed this Visit    None  Visit Diagnoses     Behavior disturbance    -  Primary    Relevant Medications    QUEtiapine (SEROquel) 100 MG tablet    citalopram (CeleXA) 20 MG tablet    ARIPiprazole (ABILIFY) 5 MG tablet    Cerebral palsy, unspecified type (HCC)        Seizure disorder (HCC)        Intellectual disability        Relevant Medications    QUEtiapine (SEROquel) 100 MG tablet    citalopram (CeleXA) 20 MG tablet    ARIPiprazole (ABILIFY) 5 MG tablet      Diagnoses       Codes Comments    Behavior disturbance    -  Primary ICD-10-CM: F91.9  ICD-9-CM: 312.9     Cerebral palsy, unspecified type (HCC)     ICD-10-CM: G80.9  ICD-9-CM: 343.9     Seizure disorder (HCC)     ICD-10-CM: G40.909  ICD-9-CM: 345.90     Intellectual disability     ICD-10-CM: F79  ICD-9-CM: 319           Functionality: pt having significant impairment in important areas of daily functioning.  Prognosis: Guarded dependent on medication/follow up and treatment plan compliance.    Patient's issues continue to be behavioral in nature.  I am not making any medication changes at this time.  She will continue the Abilify and the Celexa for behaviors.  Continue the Seroquel for sleep.  Refills have been submitted.  She gets regular lab work through her neurologist who follows her Depakote level.  Azul will let me know should patient be transferred out into a different house.      Coordinator agreeable to call the Clinic with worsening symptoms.    Coordinator is  aware to call 911 or go to the nearest ER should begin having SI/HI. Recheck 3 months telehealth visit.               This document has been electronically signed by KATE Zhang on   November 17, 2022 11:54 EST.

## 2022-12-19 ENCOUNTER — TELEPHONE (OUTPATIENT)
Dept: PSYCHIATRY | Facility: CLINIC | Age: 27
End: 2022-12-19

## 2022-12-19 NOTE — TELEPHONE ENCOUNTER
Patient took an extra dosage of Seroquel and nurses were calling asking if she needed to expect to monitor her closer or what signs to watch out for

## 2023-01-24 ENCOUNTER — OFFICE VISIT (OUTPATIENT)
Dept: CARDIOLOGY | Facility: CLINIC | Age: 28
End: 2023-01-24
Payer: MEDICARE

## 2023-01-24 VITALS
WEIGHT: 119 LBS | DIASTOLIC BLOOD PRESSURE: 68 MMHG | HEIGHT: 57 IN | HEART RATE: 94 BPM | SYSTOLIC BLOOD PRESSURE: 106 MMHG | BODY MASS INDEX: 25.67 KG/M2

## 2023-01-24 DIAGNOSIS — R60.0 BILATERAL LOWER EXTREMITY EDEMA: Primary | ICD-10-CM

## 2023-01-24 DIAGNOSIS — R06.02 SHORTNESS OF BREATH: ICD-10-CM

## 2023-01-24 PROCEDURE — 99212 OFFICE O/P EST SF 10 MIN: CPT | Performed by: PHYSICIAN ASSISTANT

## 2023-01-24 NOTE — PROGRESS NOTES
Subjective   Ave Correia is a 27 y.o. female     Chief Complaint   Patient presents with   • Follow-up     SOB       HPI  This pleasant patient presents back to the clinic today for follow-up of echocardiogram findings.  She is a resident of a long-term care home, where her caretaker is with her today.  There is report of mild intellectual disability, epilepsy, partial blind status, unsteady gait, vagus nerve stimulator, and numerous other issues as outlined in the note below.  We had seen her because of dyspnea and edema.  An echocardiogram was performed.  Echo supported preserved systolic function with no significant valvular nor structural abnormalities.  Right-sided parameters were stable of note.  Her caretaker with her today reports no edema.  Her dyspnea is at baseline.  No further cardiovascular symptoms or issues are noted.  No further complaints or noted by caretaker or the patient.      Current Outpatient Medications   Medication Sig Dispense Refill   • acetaminophen (TYLENOL) 500 MG tablet Take 1,000 mg by mouth Every 8 (Eight) Hours As Needed for Mild Pain .     • ARIPiprazole (ABILIFY) 5 MG tablet Take 1 tablet by mouth Daily. 30 tablet 2   • bisacodyl (DULCOLAX) 5 MG EC tablet Take 5 mg by mouth Daily As Needed for Constipation.     • citalopram (CeleXA) 20 MG tablet Take 1 tablet by mouth Daily. 30 tablet 2   • cloBAZam (ONFI) 10 MG tablet Take  by mouth Take As Directed. Take 3.5 tablets by mouth at bedtime.     • divalproex (DEPAKOTE ER) 250 MG 24 hr tablet Take 750 mg by mouth 2 (Two) Times a Day.     • divalproex (DEPAKOTE) 250 MG DR tablet As Needed.     • ferrous sulfate 325 (65 FE) MG tablet Take 325 mg by mouth Daily With Breakfast.     • furosemide (LASIX) 20 MG tablet Take 1 tablet by mouth Daily As Needed (Edema). (Patient taking differently: Take 20 mg by mouth Daily.) 30 tablet 2   • lactulose (CHRONULAC) 10 GM/15ML solution Take 20 g by mouth 2 (Two) Times a Day As Needed.     •  levETIRAcetam XR (KEPPRA XR) 500 MG 24 hr tablet Take 1,500 mg by mouth 2 (Two) Times a Day.     • Levonorgest-Eth Estrad 91-Day 0.15-0.03 &0.01 MG tablet Take 1 tablet by mouth Daily.     • LEVONORGESTREL-ETHINYL ESTRAD PO Take  by mouth.     • levothyroxine (SYNTHROID, LEVOTHROID) 75 MCG tablet Take 75 mcg by mouth Daily.     • multivitamin with minerals tablet tablet Take 1 tablet by mouth Daily.     • polyethylene glycol (MIRALAX) 17 g packet Take 17 g by mouth Daily.     • potassium chloride 10 MEQ CR tablet Take 1 tablet by mouth Daily As Needed (when taking Furosemide). 30 tablet 2   • QUEtiapine (SEROquel) 100 MG tablet Take 1.5 tablets by mouth Every Night. 45 tablet 2     No current facility-administered medications for this visit.       Versed [midazolam]    Past Medical History:   Diagnosis Date   • Blind right eye    • Cerebral palsy (HCC)    • Hemiparesis of right dominant side due to non-cerebrovascular etiology (HCC)    • Seizures (HCC)        Social History     Socioeconomic History   • Marital status: Single   Tobacco Use   • Smoking status: Never   • Smokeless tobacco: Never   Vaping Use   • Vaping Use: Never used   Substance and Sexual Activity   • Alcohol use: Never   • Drug use: Never       History reviewed. No pertinent family history.    Review of Systems   Constitutional: Negative.  Negative for activity change, appetite change, chills, fatigue and fever.   HENT: Negative for congestion.    Eyes: Negative.  Negative for visual disturbance.   Respiratory: Negative.  Negative for apnea, cough, chest tightness, shortness of breath and wheezing.    Cardiovascular: Negative.  Negative for chest pain, palpitations and leg swelling.   Gastrointestinal: Negative.  Negative for blood in stool.   Endocrine: Negative.  Negative for cold intolerance and heat intolerance.   Genitourinary: Negative.  Negative for hematuria.   Musculoskeletal: Positive for gait problem. Negative for arthralgias, back pain,  "joint swelling, neck pain and neck stiffness.   Skin: Negative for color change, rash and wound.   Allergic/Immunologic: Negative.  Negative for environmental allergies and food allergies.   Neurological: Positive for headaches. Negative for dizziness, syncope, weakness, light-headedness and numbness.   Hematological: Bruises/bleeds easily (Bruises).   Psychiatric/Behavioral: Negative.  Negative for sleep disturbance.       Objective     Vitals:    01/24/23 1022   BP: 106/68   BP Location: Right arm   Patient Position: Sitting   Cuff Size: Adult   Pulse: 94   Weight: 54 kg (119 lb)   Height: 144.8 cm (57\")        /68 (BP Location: Right arm, Patient Position: Sitting, Cuff Size: Adult)   Pulse 94   Ht 144.8 cm (57\")   Wt 54 kg (119 lb)   BMI 25.75 kg/m²      Lab Results (most recent)     None          Physical Exam  Vitals and nursing note reviewed.   Constitutional:       General: She is not in acute distress.     Appearance: She is well-developed.   HENT:      Head: Normocephalic and atraumatic.   Eyes:      Conjunctiva/sclera: Conjunctivae normal.   Neck:      Vascular: No JVD.      Trachea: No tracheal deviation.   Cardiovascular:      Rate and Rhythm: Normal rate and regular rhythm.      Heart sounds: Normal heart sounds.   Pulmonary:      Effort: Pulmonary effort is normal.      Breath sounds: Normal breath sounds.   Abdominal:      General: Bowel sounds are normal. There is no distension.      Palpations: Abdomen is soft. There is no mass.      Tenderness: There is no abdominal tenderness. There is no guarding or rebound.   Musculoskeletal:         General: No tenderness or deformity. Normal range of motion.      Cervical back: Normal range of motion and neck supple.   Skin:     General: Skin is warm and dry.      Coloration: Skin is not pale.      Findings: No erythema or rash.   Neurological:      Mental Status: She is alert.         Procedure   Procedures         Assessment & Plan      Diagnosis " Plan   1. Bilateral lower extremity edema        2. Shortness of breath            1.  The patient presents to review echocardiogram findings.  She is with her caretaker today, all as outlined above.  Clinically, her edema has now minimized.  Her dyspnea has returned to baseline per caretakers report.  She has no further cardiovascular issues or complications noted.    2.  Echocardiogram findings were very much benign.  She had nothing to explain symptoms structurally.  Prior EKG was unremarkable as well.  As the patient appears clinically stable and has had a benign work-up, nothing further at this time.    3.  For change in clinical course she will be returned to the clinic.  We will continue to see her on a yearly evaluation at this time.         Patient brought in medicine list to appointment, it's been reviewed with patient and med list was updated in the chart.         Electronically signed by:

## 2023-02-16 ENCOUNTER — TELEMEDICINE (OUTPATIENT)
Dept: PSYCHIATRY | Facility: CLINIC | Age: 28
End: 2023-02-16
Payer: MEDICARE

## 2023-02-16 DIAGNOSIS — G40.909 SEIZURE DISORDER: ICD-10-CM

## 2023-02-16 DIAGNOSIS — F91.9 BEHAVIOR DISTURBANCE: Primary | ICD-10-CM

## 2023-02-16 DIAGNOSIS — F79 INTELLECTUAL DISABILITY: ICD-10-CM

## 2023-02-16 DIAGNOSIS — G80.9 CEREBRAL PALSY, UNSPECIFIED TYPE: ICD-10-CM

## 2023-02-16 PROCEDURE — 99214 OFFICE O/P EST MOD 30 MIN: CPT | Performed by: NURSE PRACTITIONER

## 2023-02-16 RX ORDER — QUETIAPINE FUMARATE 100 MG/1
150 TABLET, FILM COATED ORAL NIGHTLY
Qty: 45 TABLET | Refills: 2 | Status: SHIPPED | OUTPATIENT
Start: 2023-02-16

## 2023-02-16 RX ORDER — ARIPIPRAZOLE 5 MG/1
5 TABLET ORAL DAILY
Qty: 30 TABLET | Refills: 2 | Status: SHIPPED | OUTPATIENT
Start: 2023-02-16

## 2023-02-16 RX ORDER — CITALOPRAM 20 MG/1
20 TABLET ORAL DAILY
Qty: 30 TABLET | Refills: 2 | Status: SHIPPED | OUTPATIENT
Start: 2023-02-16

## 2023-02-16 NOTE — PROGRESS NOTES
Subjective   Ave Correia is a 27 y.o. female “This provider is located at [facility], [address] [place] [address]. The provider identified herself as well as her credentials.   The Patient is located at residential housing.   The patient's condition being diagnosed/treated is appropriate for telemedicine. The patient gave consent to be seen remotely, and when consent is given they understand that the consent allows for patient identifiable information to be sent to a third party as needed.   They may refuse to be seen remotely at any time. The electronic data is encrypted and password protected, and the patient has been advised of the potential risks to privacy not withstanding such measures. Historian is Ashlie with is the coordinator for the [facility].      Chief Complaint:  Recheck on behaviors and sleep    History of Present Illness: Ashlie is the main historian.  She states that patient has actually been doing a little better.  She still has an occasional outburst but overall these are less in frequency and intensity.  She has moved to another house and has a roommate and this is working out.  At her previous house she was screaming for approximately 2 hours every night and the neighbors kept calling the police because of this so they just elected to move her.  She has not been doing that as of yet.  She has been at this new residence about 6 weeks now.  She still has approximately 2 outbursts of urinating when she gets angry.  She still going to sleep early and then getting up about 2 AM.  This seems to be patient's normal baseline behavior.  No negative side effects to her current medicines.  Member states that she does continue to have pseudoseizures occasionally but they have not witnessed any grand mal seizures.          The following portions of the patient's history were reviewed and updated as appropriate: allergies, current medications, past family history, past medical history,  past social history, past surgical history and problem list.    Review of Systems   Constitutional: Negative for activity change, appetite change and fatigue.   HENT: Negative.    Eyes: Negative for visual disturbance.   Respiratory: Negative.    Cardiovascular: Negative.    Gastrointestinal: Negative for nausea.   Endocrine: Negative.    Genitourinary: Negative.    Musculoskeletal: Positive for gait problem. Negative for arthralgias.   Skin: Negative.    Allergic/Immunologic: Negative.    Neurological: Positive for seizures and speech difficulty. Negative for dizziness and headaches.   Hematological: Negative.    Psychiatric/Behavioral: Positive for behavioral problems and sleep disturbance. Negative for agitation, confusion, decreased concentration, dysphoric mood, hallucinations, self-injury and suicidal ideas. The patient is not nervous/anxious and is not hyperactive.        Objective   Physical Exam  Vitals reviewed.   Constitutional:       Comments: She wears a helmet on her head for the drop seizures.   Musculoskeletal:      Cervical back: Normal range of motion.   Neurological:      Mental Status: She is alert.      Coordination: Coordination abnormal.      Gait: Gait abnormal.   Psychiatric:         Attention and Perception: She is inattentive.         Mood and Affect: Affect is blunt.         Behavior: Behavior is cooperative.         Cognition and Memory: Cognition is impaired.      Comments: Sits leaning forward but will sit up on command.  Face is asymmetrical. Smiles and answers simple questions.  Must be supported to walk.         There were no vitals taken for this visit.    Medication List:   Current Outpatient Medications   Medication Sig Dispense Refill   • ARIPiprazole (ABILIFY) 5 MG tablet Take 1 tablet by mouth Daily. 30 tablet 2   • citalopram (CeleXA) 20 MG tablet Take 1 tablet by mouth Daily. 30 tablet 2   • QUEtiapine (SEROquel) 100 MG tablet Take 1.5 tablets by mouth Every Night. 45 tablet  2   • acetaminophen (TYLENOL) 500 MG tablet Take 1,000 mg by mouth Every 8 (Eight) Hours As Needed for Mild Pain .     • bisacodyl (DULCOLAX) 5 MG EC tablet Take 5 mg by mouth Daily As Needed for Constipation.     • cloBAZam (ONFI) 10 MG tablet Take  by mouth Take As Directed. Take 3.5 tablets by mouth at bedtime.     • divalproex (DEPAKOTE ER) 250 MG 24 hr tablet Take 750 mg by mouth 2 (Two) Times a Day.     • divalproex (DEPAKOTE) 250 MG DR tablet As Needed.     • ferrous sulfate 325 (65 FE) MG tablet Take 325 mg by mouth Daily With Breakfast.     • furosemide (LASIX) 20 MG tablet Take 1 tablet by mouth Daily As Needed (Edema). (Patient taking differently: Take 20 mg by mouth Daily.) 30 tablet 2   • lactulose (CHRONULAC) 10 GM/15ML solution Take 20 g by mouth 2 (Two) Times a Day As Needed.     • levETIRAcetam XR (KEPPRA XR) 500 MG 24 hr tablet Take 1,500 mg by mouth 2 (Two) Times a Day.     • Levonorgest-Eth Estrad 91-Day 0.15-0.03 &0.01 MG tablet Take 1 tablet by mouth Daily.     • LEVONORGESTREL-ETHINYL ESTRAD PO Take  by mouth.     • levothyroxine (SYNTHROID, LEVOTHROID) 75 MCG tablet Take 75 mcg by mouth Daily.     • multivitamin with minerals tablet tablet Take 1 tablet by mouth Daily.     • polyethylene glycol (MIRALAX) 17 g packet Take 17 g by mouth Daily.     • potassium chloride 10 MEQ CR tablet Take 1 tablet by mouth Daily As Needed (when taking Furosemide). 30 tablet 2     No current facility-administered medications for this visit.       Mental Status Exam:   Hygiene:   good  Cooperation:  Cooperative  Eye Contact:  Poor  Psychomotor Behavior:  Slow  Affect:  Blunted  Hopelessness: Denies  Speech:  Minimal  Thought Process:  Unable to demonstrate  Thought Content:  Unable to demonstrate  Suicidal:  None  Homicidal:  None  Hallucinations:  None  Delusion:  None  Memory:  Unable to evaluate  Orientation:  Unable to evaluate  Reliability:  unable to evaluate  Insight:  Poor  Judgement:  Poor  Impulse  Control:  Fair  Physical/Medical Issues:  Yes cerbral palsy., hypothyroidism, seizures    Assessment & Plan   Problems Addressed this Visit    None  Visit Diagnoses     Behavior disturbance    -  Primary    Relevant Medications    QUEtiapine (SEROquel) 100 MG tablet    citalopram (CeleXA) 20 MG tablet    ARIPiprazole (ABILIFY) 5 MG tablet    Cerebral palsy, unspecified type (HCC)        Seizure disorder (HCC)        Intellectual disability        Relevant Medications    QUEtiapine (SEROquel) 100 MG tablet    citalopram (CeleXA) 20 MG tablet    ARIPiprazole (ABILIFY) 5 MG tablet      Diagnoses       Codes Comments    Behavior disturbance    -  Primary ICD-10-CM: F91.9  ICD-9-CM: 312.9     Cerebral palsy, unspecified type (HCC)     ICD-10-CM: G80.9  ICD-9-CM: 343.9     Seizure disorder (HCC)     ICD-10-CM: G40.909  ICD-9-CM: 345.90     Intellectual disability     ICD-10-CM: F79  ICD-9-CM: 319           Functionality: pt having significant impairment in important areas of daily functioning.  Prognosis: Guarded dependent on medication/follow up and treatment plan compliance.    Patient's issues continue to be behavioral in nature.  I am not making any medication changes at this time.  She will continue the Abilify and the Celexa for behaviors.  Continue the Seroquel for sleep.  Refills have been submitted.  She gets regular lab work through her neurologist who follows her Depakote level.  Azul will let me know should patient be transferred out into a different house.      Coordinator agreeable to call the Clinic with worsening symptoms.    Coordinator is  aware to call 911 or go to the nearest ER should begin having SI/HI. Recheck 3 months telehealth visit.               This document has been electronically signed by KATE Zhang on   February 16, 2023 10:50 EST.

## 2023-03-13 DIAGNOSIS — F91.9 BEHAVIOR DISTURBANCE: ICD-10-CM

## 2023-03-13 RX ORDER — ARIPIPRAZOLE 5 MG/1
TABLET ORAL
Qty: 30 TABLET | Refills: 2 | OUTPATIENT
Start: 2023-03-13

## 2023-03-13 RX ORDER — CITALOPRAM 20 MG/1
TABLET ORAL
Qty: 30 TABLET | Refills: 2 | OUTPATIENT
Start: 2023-03-13

## 2023-05-17 ENCOUNTER — TELEMEDICINE (OUTPATIENT)
Dept: PSYCHIATRY | Facility: CLINIC | Age: 28
End: 2023-05-17
Payer: MEDICARE

## 2023-05-17 DIAGNOSIS — F91.9 BEHAVIOR DISTURBANCE: Primary | ICD-10-CM

## 2023-05-17 DIAGNOSIS — G40.909 SEIZURE DISORDER: ICD-10-CM

## 2023-05-17 DIAGNOSIS — G80.9 CEREBRAL PALSY, UNSPECIFIED TYPE: ICD-10-CM

## 2023-05-17 DIAGNOSIS — F79 INTELLECTUAL DISABILITY: ICD-10-CM

## 2023-05-17 PROCEDURE — 1160F RVW MEDS BY RX/DR IN RCRD: CPT | Performed by: NURSE PRACTITIONER

## 2023-05-17 PROCEDURE — 1159F MED LIST DOCD IN RCRD: CPT | Performed by: NURSE PRACTITIONER

## 2023-05-17 PROCEDURE — 99214 OFFICE O/P EST MOD 30 MIN: CPT | Performed by: NURSE PRACTITIONER

## 2023-05-17 RX ORDER — ARIPIPRAZOLE 10 MG/1
10 TABLET ORAL DAILY
Qty: 30 TABLET | Refills: 1 | Status: SHIPPED | OUTPATIENT
Start: 2023-05-17

## 2023-05-17 RX ORDER — HYDROXYZINE HYDROCHLORIDE 25 MG/1
25 TABLET, FILM COATED ORAL 2 TIMES DAILY
Qty: 60 TABLET | Refills: 1 | Status: SHIPPED | OUTPATIENT
Start: 2023-05-17

## 2023-05-17 RX ORDER — QUETIAPINE FUMARATE 100 MG/1
150 TABLET, FILM COATED ORAL NIGHTLY
Qty: 45 TABLET | Refills: 1 | Status: SHIPPED | OUTPATIENT
Start: 2023-05-17

## 2023-05-17 RX ORDER — CITALOPRAM 20 MG/1
20 TABLET ORAL DAILY
Qty: 30 TABLET | Refills: 1 | Status: SHIPPED | OUTPATIENT
Start: 2023-05-17

## 2023-05-17 NOTE — PROGRESS NOTES
Subjective   Ave Correia is a 28 y.o. female “This provider is located at [facility], [address] [place] [address]. The provider identified herself as well as her credentials.   The Patient is located at residential housing.   The patient's condition being diagnosed/treated is appropriate for telemedicine. The patient gave consent to be seen remotely, and when consent is given they understand that the consent allows for patient identifiable information to be sent to a third party as needed.   They may refuse to be seen remotely at any time. The electronic data is encrypted and password protected, and the patient has been advised of the potential risks to privacy not withstanding such measures. Historian is Ashlie with is the coordinator for the [facility].      Chief Complaint:  Recheck on behaviors and sleep    History of Present Illness: Ashlie is the main historian.  Pt fell yesterday and broke her ankle.  Ashlie says pt does not seem to be in pain from this currently.  She had been moved in with another private person and had to be relocated due to bad behavior.  She is now in housing with staff.  She has some good days but most days she is still exhibiting maladaptive behaviors.  She will scream and yell basically all night.  Smearing feces on wall and hitting and pinching staff at times.  She continues to have seizures and had recent med adjustment per neuro in which her depakote was increased.  No apparent negative side effects from the meds.  No tremors noted.  Wants to go to sleelp at 6 PM and at times gets up at midnight and stays up.  Will occasionally nap during the day.            The following portions of the patient's history were reviewed and updated as appropriate: allergies, current medications, past family history, past medical history, past social history, past surgical history and problem list.    Review of Systems   Constitutional: Negative for activity change, appetite  change and fatigue.   HENT: Negative.    Eyes: Negative for visual disturbance.   Respiratory: Negative.    Cardiovascular: Negative.    Gastrointestinal: Negative for nausea.   Endocrine: Negative.    Genitourinary: Negative.    Musculoskeletal: Positive for gait problem. Negative for arthralgias.   Skin: Negative.    Allergic/Immunologic: Negative.    Neurological: Positive for seizures and speech difficulty. Negative for dizziness and headaches.   Hematological: Negative.    Psychiatric/Behavioral: Positive for behavioral problems and sleep disturbance. Negative for agitation, confusion, decreased concentration, dysphoric mood, hallucinations, self-injury and suicidal ideas. The patient is not nervous/anxious and is not hyperactive.        Objective   Physical Exam  Vitals reviewed.   Constitutional:       Comments: She wears a helmet on her head for the drop seizures.   Musculoskeletal:      Cervical back: Normal range of motion.   Neurological:      Mental Status: She is alert.      Coordination: Coordination abnormal.      Gait: Gait abnormal.   Psychiatric:         Attention and Perception: She is inattentive.         Mood and Affect: Affect is blunt.         Behavior: Behavior is cooperative.         Cognition and Memory: Cognition is impaired.      Comments: Sits leaning forward but will sit up on command.  Face is asymmetrical. Smiles and answers simple questions.  Must be supported to walk.         There were no vitals taken for this visit.    Medication List:   Current Outpatient Medications   Medication Sig Dispense Refill   • citalopram (CeleXA) 20 MG tablet Take 1 tablet by mouth Daily. 30 tablet 1   • QUEtiapine (SEROquel) 100 MG tablet Take 1.5 tablets by mouth Every Night. 45 tablet 1   • ARIPiprazole (Abilify) 10 MG tablet Take 1 tablet by mouth Daily. 30 tablet 1   • bisacodyl (DULCOLAX) 5 MG EC tablet Take 5 mg by mouth Daily As Needed for Constipation.     • hydrOXYzine (ATARAX) 25 MG tablet  Take 1 tablet by mouth 2 (Two) Times a Day. 60 tablet 1   • levothyroxine (SYNTHROID, LEVOTHROID) 75 MCG tablet Take 75 mcg by mouth Daily.     • multivitamin with minerals tablet tablet Take 1 tablet by mouth Daily.     • polyethylene glycol (MIRALAX) 17 g packet Take 17 g by mouth Daily.     • potassium chloride 10 MEQ CR tablet Take 1 tablet by mouth Daily As Needed (when taking Furosemide). 30 tablet 2     No current facility-administered medications for this visit.       Mental Status Exam:   Hygiene:   good  Cooperation:  Cooperative  Eye Contact:  Poor  Psychomotor Behavior:  Slow  Affect:  Blunted  Hopelessness: Denies  Speech:  Minimal  Thought Process:  Unable to demonstrate  Thought Content:  Unable to demonstrate  Suicidal:  None  Homicidal:  None  Hallucinations:  None  Delusion:  None  Memory:  Unable to evaluate  Orientation:  Unable to evaluate  Reliability:  unable to evaluate  Insight:  Poor  Judgement:  Poor  Impulse Control:  Fair  Physical/Medical Issues:  Yes cerbral palsy., hypothyroidism, seizures    Assessment & Plan   Problems Addressed this Visit    None  Visit Diagnoses     Behavior disturbance    -  Primary    Relevant Medications    ARIPiprazole (Abilify) 10 MG tablet    citalopram (CeleXA) 20 MG tablet    QUEtiapine (SEROquel) 100 MG tablet    hydrOXYzine (ATARAX) 25 MG tablet    Cerebral palsy, unspecified type        Intellectual disability        Seizure disorder          Diagnoses       Codes Comments    Behavior disturbance    -  Primary ICD-10-CM: F91.9  ICD-9-CM: 312.9     Cerebral palsy, unspecified type     ICD-10-CM: G80.9  ICD-9-CM: 343.9     Intellectual disability     ICD-10-CM: F79  ICD-9-CM: 319     Seizure disorder     ICD-10-CM: G40.909  ICD-9-CM: 345.90           Functionality: pt having significant impairment in important areas of daily functioning.  Prognosis: Guarded dependent on medication/follow up and treatment plan compliance.    I am increasing the abilify to  10mg for the maladaptive behaviors.  Adding some atarax BID to try and help with outbursts.  Considered neurontin however pt is on max out clobazem so respiratory depression was concern.  She is to continue the celexa for now but I am not sure it is having any effect, not stopping it now with increasing the abilify in case there is some side effect will not be sure of the etiology. Continue the seroquel for behaviors and sleep.   Refills submitted.        Coordinator agreeable to call the Clinic with worsening symptoms.    Coordinator is  aware to call 911 or go to the nearest ER should begin having SI/HI. Recheck 6 weeks  telehealth visit.  She is scheduled for blood work with her PCP and we will discuss those results next visit.               This document has been electronically signed by KATE Zhang on   May 17, 2023 15:51 EDT.

## 2023-06-12 DIAGNOSIS — F91.9 BEHAVIOR DISTURBANCE: ICD-10-CM

## 2023-06-13 RX ORDER — HYDROXYZINE HYDROCHLORIDE 25 MG/1
TABLET, FILM COATED ORAL
Qty: 60 TABLET | Refills: 10 | OUTPATIENT
Start: 2023-06-13

## 2023-06-13 RX ORDER — QUETIAPINE FUMARATE 100 MG/1
TABLET, FILM COATED ORAL
Qty: 47 TABLET | Refills: 2 | OUTPATIENT
Start: 2023-06-13

## 2023-06-13 RX ORDER — CITALOPRAM 20 MG/1
TABLET ORAL
Qty: 31 TABLET | Refills: 2 | OUTPATIENT
Start: 2023-06-13

## 2023-09-08 DIAGNOSIS — F91.9 BEHAVIOR DISTURBANCE: ICD-10-CM

## 2023-09-11 RX ORDER — CITALOPRAM 20 MG/1
TABLET ORAL
Qty: 28 TABLET | Refills: 3 | OUTPATIENT
Start: 2023-09-11

## 2023-09-14 DIAGNOSIS — F91.9 BEHAVIOR DISTURBANCE: ICD-10-CM

## 2023-09-14 RX ORDER — HYDROXYZINE HYDROCHLORIDE 25 MG/1
TABLET, FILM COATED ORAL
Qty: 6 TABLET | Refills: 1 | Status: SHIPPED | OUTPATIENT
Start: 2023-09-14

## 2023-09-15 DIAGNOSIS — F91.9 BEHAVIOR DISTURBANCE: ICD-10-CM

## 2023-09-18 DIAGNOSIS — F91.9 BEHAVIOR DISTURBANCE: ICD-10-CM

## 2023-09-18 RX ORDER — ARIPIPRAZOLE 10 MG/1
TABLET ORAL
Qty: 31 TABLET | Refills: 1 | Status: SHIPPED | OUTPATIENT
Start: 2023-09-18

## 2023-09-18 RX ORDER — CITALOPRAM 20 MG/1
TABLET ORAL
Qty: 31 TABLET | Refills: 3 | OUTPATIENT
Start: 2023-09-18

## 2023-09-18 RX ORDER — QUETIAPINE FUMARATE 100 MG/1
TABLET, FILM COATED ORAL
Qty: 47 TABLET | Refills: 1 | Status: SHIPPED | OUTPATIENT
Start: 2023-09-18

## 2023-09-18 RX ORDER — QUETIAPINE FUMARATE 100 MG/1
TABLET, FILM COATED ORAL
Qty: 47 TABLET | Refills: 3 | OUTPATIENT
Start: 2023-09-18

## 2023-09-18 RX ORDER — CITALOPRAM 20 MG/1
TABLET ORAL
Qty: 31 TABLET | Refills: 1 | Status: SHIPPED | OUTPATIENT
Start: 2023-09-18

## 2023-10-06 DIAGNOSIS — F91.9 BEHAVIOR DISTURBANCE: ICD-10-CM

## 2023-10-06 RX ORDER — HYDROXYZINE HYDROCHLORIDE 25 MG/1
TABLET, FILM COATED ORAL
Qty: 60 TABLET | Refills: 2 | OUTPATIENT
Start: 2023-10-06

## 2023-11-03 DIAGNOSIS — F91.9 BEHAVIOR DISTURBANCE: ICD-10-CM

## 2023-11-06 RX ORDER — HYDROXYZINE HYDROCHLORIDE 25 MG/1
TABLET, FILM COATED ORAL
Qty: 60 TABLET | Refills: 10 | OUTPATIENT
Start: 2023-11-06

## 2023-11-07 DIAGNOSIS — F91.9 BEHAVIOR DISTURBANCE: ICD-10-CM

## 2023-11-07 RX ORDER — HYDROXYZINE HYDROCHLORIDE 25 MG/1
25 TABLET, FILM COATED ORAL 2 TIMES DAILY
Qty: 6 TABLET | Refills: 1 | OUTPATIENT
Start: 2023-11-07

## 2023-11-10 DIAGNOSIS — F91.9 BEHAVIOR DISTURBANCE: ICD-10-CM

## 2023-11-13 DIAGNOSIS — F91.9 BEHAVIOR DISTURBANCE: ICD-10-CM

## 2023-11-13 RX ORDER — HYDROXYZINE HYDROCHLORIDE 25 MG/1
25 TABLET, FILM COATED ORAL 2 TIMES DAILY
Qty: 6 TABLET | Refills: 1 | OUTPATIENT
Start: 2023-11-13

## 2023-11-14 DIAGNOSIS — F91.9 BEHAVIOR DISTURBANCE: ICD-10-CM

## 2023-11-15 RX ORDER — HYDROXYZINE HYDROCHLORIDE 25 MG/1
TABLET, FILM COATED ORAL
Qty: 33 TABLET | Refills: 2 | OUTPATIENT
Start: 2023-11-15

## 2023-12-22 ENCOUNTER — APPOINTMENT (OUTPATIENT)
Dept: CT IMAGING | Facility: HOSPITAL | Age: 28
End: 2023-12-22
Payer: MEDICARE

## 2023-12-22 ENCOUNTER — APPOINTMENT (OUTPATIENT)
Dept: GENERAL RADIOLOGY | Facility: HOSPITAL | Age: 28
End: 2023-12-22
Payer: MEDICARE

## 2023-12-22 ENCOUNTER — HOSPITAL ENCOUNTER (EMERGENCY)
Facility: HOSPITAL | Age: 28
Discharge: HOME | End: 2023-12-22
Attending: EMERGENCY MEDICINE
Payer: MEDICARE

## 2023-12-22 VITALS
HEART RATE: 78 BPM | DIASTOLIC BLOOD PRESSURE: 83 MMHG | HEIGHT: 57 IN | OXYGEN SATURATION: 98 % | WEIGHT: 119 LBS | TEMPERATURE: 96.4 F | RESPIRATION RATE: 18 BRPM | SYSTOLIC BLOOD PRESSURE: 112 MMHG | BODY MASS INDEX: 25.67 KG/M2
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
## 2023-12-22 PROCEDURE — 72128 CT CHEST SPINE W/O DYE: CPT

## 2023-12-22 PROCEDURE — 73060 X-RAY EXAM OF HUMERUS: CPT

## 2023-12-22 PROCEDURE — 73610 X-RAY EXAM OF ANKLE: CPT

## 2023-12-22 PROCEDURE — 72131 CT LUMBAR SPINE W/O DYE: CPT

## 2023-12-22 PROCEDURE — 73630 X-RAY EXAM OF FOOT: CPT

## 2023-12-22 PROCEDURE — 73590 X-RAY EXAM OF LOWER LEG: CPT

## 2023-12-22 PROCEDURE — 99284 EMERGENCY DEPT VISIT MOD MDM: CPT

## 2023-12-22 RX ORDER — LEVOTHYROXINE SODIUM 0.07 MG/1
75 TABLET ORAL DAILY
COMMUNITY

## 2023-12-22 NOTE — ED PROVIDER NOTES
"Subjective:  History of Present Illness:    Patient is a 28-year-old female with history of cerebral palsy.  She presents to the ER today status post fall.  She is with right foot ankle tib-fib, arm and back pain.  Patient is in a care facility.  The fall was unwitnessed.  LOC is unknown.  Patient is at baseline at this time.  Denies OTC medication home remedy.  Denies leaving exacerbating factors.    Nurses Notes reviewed and agree, including vitals, allergies, social history and prior medical history.     REVIEW OF SYSTEMS: All systems reviewed and not pertinent unless noted.  Review of Systems   Musculoskeletal:         Right foot ankle leg arm back pain.   All other systems reviewed and are negative.      Past Medical History:   Diagnosis Date    Blind right eye     Cerebral palsy     Hemiparesis of right dominant side due to non-cerebrovascular etiology     Seizures        Allergies:    Versed [midazolam]      Past Surgical History:   Procedure Laterality Date    DENTAL EXAMINATION UNDER ANESTHESIA W/ CLEANING AND XRAYS      Fillings placed.    IMPLANTATION VAGAL NERVE STIMULATOR           Social History     Socioeconomic History    Marital status: Single   Tobacco Use    Smoking status: Never    Smokeless tobacco: Never   Vaping Use    Vaping Use: Never used   Substance and Sexual Activity    Alcohol use: Never    Drug use: Never         History reviewed. No pertinent family history.    Objective  Physical Exam:  /81 (BP Location: Left arm, Patient Position: Lying)   Pulse 78   Temp 96.4 °F (35.8 °C) (Oral)   Resp 18   Ht 144.8 cm (57\")   Wt 54 kg (119 lb)   SpO2 98%   BMI 25.75 kg/m²      Physical Exam  Vitals and nursing note reviewed.   Constitutional:       Appearance: Normal appearance. She is normal weight.   HENT:      Head: Normocephalic and atraumatic.      Nose: Nose normal.      Mouth/Throat:      Mouth: Mucous membranes are moist.      Pharynx: Oropharynx is clear.   Eyes:      " Extraocular Movements: Extraocular movements intact.      Conjunctiva/sclera: Conjunctivae normal.      Pupils: Pupils are equal, round, and reactive to light.   Cardiovascular:      Rate and Rhythm: Normal rate and regular rhythm.   Pulmonary:      Effort: Pulmonary effort is normal.      Breath sounds: Normal breath sounds.   Abdominal:      General: Abdomen is flat. Bowel sounds are normal.      Palpations: Abdomen is soft.   Musculoskeletal:         General: Normal range of motion.      Cervical back: Normal range of motion and neck supple.      Comments: There is tenderness with palpation of lumbar and thoracic spine.  Range of motion right foot ankle and right arm are within intact.  Neurovascularly intact.  Circulation is intact.   Skin:     General: Skin is warm and dry.      Capillary Refill: Capillary refill takes less than 2 seconds.   Neurological:      General: No focal deficit present.      Mental Status: She is alert and oriented to person, place, and time. Mental status is at baseline.   Psychiatric:         Mood and Affect: Mood normal.         Behavior: Behavior normal.         Thought Content: Thought content normal.         Judgment: Judgment normal.         Procedures    ED Course:         Lab Results (last 24 hours)       ** No results found for the last 24 hours. **             CT Lumbar Spine Without Contrast    Result Date: 12/22/2023  PROCEDURE: CT LUMBAR SPINE WO CONTRAST-  HISTORY:  Lumbar pain status post injury  TECHNIQUE: Thin section axial CT with sagittal and coronal reconstructions  FINDINGS: No fracture is present. Alignment is normal.No bony canal stenosis is seen.No obvious disc abnormalities are seen.      Impression: Negative CT evaluation of the lumbar spine for acute bony injury.    This study was performed with techniques to keep radiation doses as low as reasonably achievable (ALARA). Individualized dose reduction techniques using automated exposure control or adjustment of  vA and/or kV according to the patient size were employed.  This report was signed and finalized on 12/22/2023 10:06 AM by Karthikeyan Reeves MD.      CT Thoracic Spine Without Contrast    Result Date: 12/22/2023  PROCEDURE: CT THORACIC SPINE WO CONTRAST-  HISTORY: Thoracic pain status post injury.  TECHNIQUE: Thin section axial CT with sagittal and coronal reconstructions  FINDINGS: No fracture is present. Alignment is normal. No bony canal stenosis is seen. No obvious disc abnormalities are seen.  Tiny bilateral pleural effusions are noted.      Impression: Negative CT evaluation of the thoracic spine for acute bony injury.    This study was performed with techniques to keep radiation doses as low as reasonably achievable (ALARA). Individualized dose reduction techniques using automated exposure control or adjustment of vA and/or kV according to the patient size were employed.  This report was signed and finalized on 12/22/2023 10:05 AM by Karthikeyan Reeves MD.      XR Humerus Right    Result Date: 12/22/2023  PROCEDURE: XR HUMERUS RIGHT-  2 VIEW  HISTORY: Right arm pain status post injury., Pain  FINDINGS:  Two views show no evidence of an acute, displaced fracture or dislocation of the visualized bony architecture.  The joint spaces appear normal.      Impression: Unremarkable exam.     This report was signed and finalized on 12/22/2023 9:44 AM by Karthikeyan Reeves MD.      XR Foot 3+ View Right    Result Date: 12/22/2023  PROCEDURE: XR FOOT 3+ VW RIGHT-  THREE VIEW  HISTORY: Foot pain status post injury  FINDINGS:  Three views show no evidence of an acute, displaced fracture or dislocation of the visualized bony architecture.  The joint spaces appear normal.      Impression: Unremarkable exam.     This report was signed and finalized on 12/22/2023 9:43 AM by Karthikeyan Reeves MD.      XR Tibia Fibula 2 View Right    Result Date: 12/22/2023  PROCEDURE: XR TIBIA FIBULA 2 VW RIGHT-  2 VIEW  HISTORY: Right leg pain status post  injury, pain  FINDINGS:  Two views show no evidence of an acute, displaced fracture or dislocation of the visualized bony architecture. Post ORIF changes are seen of the distal tibia and fibula. The joint spaces appear normal.      Impression: No acute finding     This report was signed and finalized on 12/22/2023 9:42 AM by Karthikeyan Reeves MD.      XR Ankle 3+ View Right    Result Date: 12/22/2023  PROCEDURE: XR ANKLE 3+ VW RIGHT-  THREE VIEW  HISTORY: Right ankle pain status post injury  FINDINGS:  Three views show post ORIF changes of distal fibula and tibia. Hardware is unremarkable. No acute fracture is seen. The joint spaces appear normal.      Impression: No acute bony abnormality     This report was signed and finalized on 12/22/2023 9:41 AM by Karthikeyan Reeves MD.          [facility]      Initial impression of presenting illness: Patient is a 28-year-old female with history of cerebral palsy.  She presents to the ER today status post fall.  She is with right foot ankle tib-fib, arm and back pain.  Patient is in a care facility.  The fall was unwitnessed.  LOC is unknown.  Patient is at baseline at this time.  Denies OTC medication home remedy.  Denies leaving exacerbating factors.    DDX: includes but is not limited to: Sprain, strain, fracture or other    Patient arrives stable with vitals interpreted by myself.     Pertinent features from physical exam: There is tenderness with palpation of thoracic and lumbar spine.  Also pain with palpation of right foot right ankle right leg and right arm.  Range of motion is intact.  Neurovascular intact.  Circulation is intact..    Initial diagnostic plan: X-ray of right ankle, foot, tib-fib, humerus, CT of lumbar, CT of thoracic.    Results from initial plan were reviewed and interpreted by me revealing no acute fractures noted    Diagnostic information from other sources: Chart review    Interventions / Re-evaluation: Vital signs stable throughout encounter    Results/clinical  rationale were discussed with patient/patient caregiver.    Consultations/Discussion of results with other physicians: N/A    Disposition plan: Patient is hemodynamically stable nontoxic-appearing appropriate for discharge.  Recommend OTC nonsteroidal anti-inflammatory.  Will have patient follow-up with PCP in 1 week.  Follow-up with ER for new or worse symptoms.  -----        Final diagnoses:   Fall, initial encounter          Jose M Horvath, APRN  12/22/23 1014

## 2023-12-22 NOTE — DISCHARGE INSTRUCTIONS
Patient to follow-up with PCP in 1 week.  Please take OTC nonsteroidal anti-inflammatories to assist with aches and pains.  Follow-up with ER for new or worsening symptoms.

## 2024-01-04 ENCOUNTER — OFFICE VISIT (OUTPATIENT)
Dept: PSYCHIATRY | Facility: CLINIC | Age: 29
End: 2024-01-04
Payer: MEDICARE

## 2024-01-04 VITALS
WEIGHT: 110.4 LBS | SYSTOLIC BLOOD PRESSURE: 110 MMHG | OXYGEN SATURATION: 98 % | HEIGHT: 57 IN | DIASTOLIC BLOOD PRESSURE: 73 MMHG | TEMPERATURE: 98.9 F | BODY MASS INDEX: 23.82 KG/M2 | HEART RATE: 90 BPM

## 2024-01-04 DIAGNOSIS — Z79.899 LONG-TERM USE OF HIGH-RISK MEDICATION: ICD-10-CM

## 2024-01-04 DIAGNOSIS — F91.9 BEHAVIOR DISTURBANCE: Primary | ICD-10-CM

## 2024-01-04 DIAGNOSIS — G40.909 SEIZURE DISORDER: ICD-10-CM

## 2024-01-04 DIAGNOSIS — F79 INTELLECTUAL DISABILITY: ICD-10-CM

## 2024-01-04 DIAGNOSIS — G80.9 CEREBRAL PALSY, UNSPECIFIED TYPE: ICD-10-CM

## 2024-01-04 PROCEDURE — 1159F MED LIST DOCD IN RCRD: CPT | Performed by: NURSE PRACTITIONER

## 2024-01-04 PROCEDURE — 1160F RVW MEDS BY RX/DR IN RCRD: CPT | Performed by: NURSE PRACTITIONER

## 2024-01-04 PROCEDURE — 99214 OFFICE O/P EST MOD 30 MIN: CPT | Performed by: NURSE PRACTITIONER

## 2024-01-04 RX ORDER — HYDROXYZINE HYDROCHLORIDE 25 MG/1
25 TABLET, FILM COATED ORAL 2 TIMES DAILY
Qty: 60 TABLET | Refills: 2 | Status: SHIPPED | OUTPATIENT
Start: 2024-01-04

## 2024-01-04 RX ORDER — ARIPIPRAZOLE 10 MG/1
10 TABLET ORAL DAILY
Qty: 31 TABLET | Refills: 2 | Status: SHIPPED | OUTPATIENT
Start: 2024-01-04

## 2024-01-04 RX ORDER — QUETIAPINE FUMARATE 100 MG/1
150 TABLET, FILM COATED ORAL NIGHTLY
Qty: 47 TABLET | Refills: 2 | Status: SHIPPED | OUTPATIENT
Start: 2024-01-04

## 2024-01-04 RX ORDER — CITALOPRAM 20 MG/1
20 TABLET ORAL DAILY
Qty: 31 TABLET | Refills: 2 | Status: SHIPPED | OUTPATIENT
Start: 2024-01-04

## 2024-01-04 NOTE — PROGRESS NOTES
"      Subjective   Ave Correia is a 28 y.o. female presents with Jacque.  Pt has now been moved to [facility] (supportive living program).  Lives with 2 other individuals and supported by staff.      Chief Complaint:  Recheck on behaviors and sleep    History of Present Illness: Patient is now in a new house with 2 other roommates in [facility].  Presents with staff member.  Staff member states that patient has been with them now for 6 weeks.  States they are learning her behaviors etc.  Staff has witnessed to what they seem to think are \"true seizures\".  In the 6 weeks but states a couple of times a week that patient will have \"pseudoseizures\" that occur when she is upset or mad about something she will throw herself down the ground and pretend to have a seizure.  She has refused her medication a few times in the last 6 weeks.  For the most part though staff member feels like patient is at her baseline.  She has had a few instances when she refuses to take her medications is mad over something and will sit and scream for hours.  She has had 3 of those big outbursts in the last 6 weeks.  She is getting 8 hours of sleep however her time continues to be off.  If she goes to bed usually at 7:00 at night she will sleep 8 hours and then she is up for the day.  They have tried to keep her up later however patient becomes very agitated with this therefore her outbursts escalate.  They have not noticed any negative side effects to the meds.  No tremors.Body mass index is 23.88 kg/m².  Weight loss of 9 pounds over the last year.   appetite is good.  No acute medical stressors.  Patient still has an occasional outburst of using the bathroom on herself etc. when she gets mad but says daff states that this has been infrequent.          The following portions of the patient's history were reviewed and updated as appropriate: allergies, current medications, past family history, past medical history, past social history, past " "surgical history and problem list.    Review of Systems   Constitutional: Negative for activity change, appetite change and fatigue.   HENT: Negative.    Eyes: Negative for visual disturbance.   Respiratory: Negative.    Cardiovascular: Negative.    Gastrointestinal: Negative for nausea.   Endocrine: Negative.    Genitourinary: Negative.    Musculoskeletal: Positive for gait problem. Negative for arthralgias.   Skin: Negative.    Allergic/Immunologic: Negative.    Neurological: Positive for seizures and speech difficulty. Negative for dizziness and headaches.   Hematological: Negative.    Psychiatric/Behavioral: Positive for behavioral problems  Negative for agitation, confusion, decreased concentration, dysphoric mood, hallucinations, self-injury and suicidal ideas. The patient is not nervous/anxious and is not hyperactive.        Objective   Physical Exam  Vitals reviewed.   Constitutional:       Comments: She wears a helmet on her head for the drop seizures.   Musculoskeletal:      Cervical back: Normal range of motion.   Neurological:      Mental Status: She is alert.      Coordination: Coordination abnormal.      Gait: Gait abnormal.   Psychiatric:         Attention and Perception: She is inattentive.         Mood and Affect: Affect is blunt.        Behavior: Behavior is cooperative.         Cognition and Memory: Cognition is impaired.      Comments: Sits leaning forward but will sit up on command.  Face is asymmetrical. Smiles and answers simple questions.  Must be supported to walk.   She did smile at me today and was interactive even giving me a bracleet she made.        Blood pressure 110/73, pulse 90, temperature 98.9 °F (37.2 °C), height 144.8 cm (57.01\"), weight 50.1 kg (110 lb 6.4 oz), SpO2 98%.    Medication List:   Current Outpatient Medications   Medication Sig Dispense Refill    ARIPiprazole (ABILIFY) 10 MG tablet Take 1 tablet by mouth Daily. 31 tablet 2    citalopram (CeleXA) 20 MG tablet Take 1 " tablet by mouth Daily. 31 tablet 2    hydrOXYzine (ATARAX) 25 MG tablet Take 1 tablet by mouth 2 (Two) Times a Day. 60 tablet 2    QUEtiapine (SEROquel) 100 MG tablet Take 1.5 tablets by mouth Every Night. 47 tablet 2    bisacodyl (DULCOLAX) 5 MG EC tablet Take 5 mg by mouth Daily As Needed for Constipation.      cloBAZam (ONFI) 10 MG tablet Take 2 tablets by mouth.      cloBAZam (ONFI) 20 MG tablet 35 mg.      divalproex (DEPAKOTE) 500 MG DR tablet Take 2 tablets by mouth.      FeroSul 325 (65 Fe) MG tablet Take 1 tablet by mouth Daily.      furosemide (LASIX) 20 MG tablet Take 1 tablet by mouth Daily.      levETIRAcetam (KEPPRA) 1000 MG tablet Take 2 tablets by mouth.      Levonorgest-Eth Estrad 91-Day (Jaimiess) 0.15-0.03 &0.01 MG Take 1 tablet by mouth Daily.      levothyroxine (SYNTHROID, LEVOTHROID) 75 MCG tablet Take 1 tablet by mouth Daily.      multivitamin with minerals tablet tablet Take 1 tablet by mouth Daily.      polyethylene glycol (MIRALAX) 17 GM/SCOOP powder Take 17 g by mouth Daily.      potassium chloride 10 MEQ CR tablet Take 1 tablet by mouth Daily.       No current facility-administered medications for this visit.       Mental Status Exam:   Hygiene:   good  Cooperation:  Cooperative  Eye Contact:  Poor  Psychomotor Behavior:  Slow  Affect:  Blunted  Hopelessness: Denies  Speech:  Minimal  Thought Process:  Unable to demonstrate  Thought Content:  Unable to demonstrate  Suicidal:  None  Homicidal:  None  Hallucinations:  None  Delusion:  None  Memory:  Unable to evaluate  Orientation:  Unable to evaluate  Reliability:   unable to evaluate  Insight:  Poor  Judgement:  Poor  Impulse Control:  Fair  Physical/Medical Issues:  Yes cerbral palsy., hypothyroidism, seizures    Assessment & Plan   Problems Addressed this Visit    None  Visit Diagnoses       Behavior disturbance    -  Primary    Relevant Medications    ARIPiprazole (ABILIFY) 10 MG tablet    citalopram (CeleXA) 20 MG tablet    QUEtiapine  (SEROquel) 100 MG tablet    hydrOXYzine (ATARAX) 25 MG tablet    Intellectual disability        Cerebral palsy, unspecified type        Seizure disorder        Long-term use of high-risk medication        Relevant Orders    CBC & Differential    Comprehensive Metabolic Panel    Hemoglobin A1c    Lipid Panel          Diagnoses         Codes Comments    Behavior disturbance    -  Primary ICD-10-CM: F91.9  ICD-9-CM: 312.9     Intellectual disability     ICD-10-CM: F79  ICD-9-CM: 319     Cerebral palsy, unspecified type     ICD-10-CM: G80.9  ICD-9-CM: 343.9     Seizure disorder     ICD-10-CM: G40.909  ICD-9-CM: 345.90     Long-term use of high-risk medication     ICD-10-CM: Z79.899  ICD-9-CM: V58.69             Functionality: pt having significant impairment in important areas of daily functioning.  Prognosis: Guarded dependent on medication/follow up and treatment plan compliance.    She will continue the Abilify for the maladaptive behaviors, hydroxyzine for behavioral disturbance and Celexa for behavioral disturbance.  Not go to change anything right now because patient is doing better.  Continue Celexa for anxiety.  Continue Seroquel for sleep.  Refills been submitted.  I have placed an order for labs to assess for metabolic syndrome which include CMP, CBC, lipid and HbA1c.  Patient is due to see her neurologist next week and they manage her Depakote.  I am assuming they will draw a Depakote level as well as other labs so I have placed the order in the computer and staff says they will tell them to draw those labs when they sticker for the Depakote level.  I will be able to view the results as they have the [employer] system at the [facility].  If something happens and patient does not have her blood drawn sta has been instructed to get my labs done at the [facility] in [place].  Continue with telehealth visits and we will schedule her back in 3 months.  Staff will notify me sooner should any problems  develop.        Coordinator agreeable to call the Clinic with worsening symptoms.    Coordinator is  aware to call 911 or go to the nearest ER should begin having SI/HI.            This document has been electronically signed by KATE Zhang on   January 4, 2024 15:27 EST.

## 2024-03-13 ENCOUNTER — TELEPHONE (OUTPATIENT)
Dept: FAMILY MEDICINE CLINIC | Facility: CLINIC | Age: 29
End: 2024-03-13
Payer: MEDICARE

## 2024-03-13 NOTE — TELEPHONE ENCOUNTER
Contacted the pt's legal guardian to inform her of overdue lab work that needs to be done. The guardian stated that she would inform the caregivers of these labs so they could be done at the pt's next visit.

## 2024-04-05 DIAGNOSIS — F91.9 BEHAVIOR DISTURBANCE: ICD-10-CM

## 2024-04-05 RX ORDER — QUETIAPINE FUMARATE 100 MG/1
TABLET, FILM COATED ORAL
Qty: 45 TABLET | Refills: 2 | Status: SHIPPED | OUTPATIENT
Start: 2024-04-05

## 2024-04-15 ENCOUNTER — OFFICE VISIT (OUTPATIENT)
Dept: PSYCHIATRY | Facility: CLINIC | Age: 29
End: 2024-04-15
Payer: MEDICARE

## 2024-04-15 VITALS
HEIGHT: 57 IN | BODY MASS INDEX: 22.26 KG/M2 | DIASTOLIC BLOOD PRESSURE: 69 MMHG | OXYGEN SATURATION: 98 % | SYSTOLIC BLOOD PRESSURE: 103 MMHG | HEART RATE: 106 BPM | WEIGHT: 103.2 LBS | TEMPERATURE: 97.8 F

## 2024-04-15 DIAGNOSIS — G80.9 CEREBRAL PALSY, UNSPECIFIED TYPE: ICD-10-CM

## 2024-04-15 DIAGNOSIS — G40.909 SEIZURE DISORDER: ICD-10-CM

## 2024-04-15 DIAGNOSIS — F79 INTELLECTUAL DISABILITY: ICD-10-CM

## 2024-04-15 DIAGNOSIS — F91.9 BEHAVIOR DISTURBANCE: Primary | ICD-10-CM

## 2024-04-15 DIAGNOSIS — Z79.899 LONG-TERM USE OF HIGH-RISK MEDICATION: ICD-10-CM

## 2024-04-15 PROCEDURE — 1160F RVW MEDS BY RX/DR IN RCRD: CPT | Performed by: NURSE PRACTITIONER

## 2024-04-15 PROCEDURE — 1159F MED LIST DOCD IN RCRD: CPT | Performed by: NURSE PRACTITIONER

## 2024-04-15 PROCEDURE — 99214 OFFICE O/P EST MOD 30 MIN: CPT | Performed by: NURSE PRACTITIONER

## 2024-04-15 RX ORDER — ARIPIPRAZOLE 10 MG/1
10 TABLET ORAL DAILY
Qty: 31 TABLET | Refills: 3 | Status: SHIPPED | OUTPATIENT
Start: 2024-04-15

## 2024-04-15 RX ORDER — QUETIAPINE FUMARATE 100 MG/1
100 TABLET, FILM COATED ORAL NIGHTLY
Qty: 30 TABLET | Refills: 3 | Status: SHIPPED | OUTPATIENT
Start: 2024-04-15

## 2024-04-15 RX ORDER — CITALOPRAM 20 MG/1
20 TABLET ORAL DAILY
Qty: 31 TABLET | Refills: 3 | Status: SHIPPED | OUTPATIENT
Start: 2024-04-15

## 2024-04-15 NOTE — PROGRESS NOTES
"      Subjective   Ave Correia is a 29 y.o. female presents with Jacque.  Pt continues to live at the  [facility] (supportive living program).  Lives with 2 other individuals and supported by staff.      Chief Complaint:  Recheck on behaviors and sleep    History of Present Illness: Jacque states patient continues to be \"about the same\".  She continues with maladaptive behaviors.  Had 23 refusals in March for showers.  Jacque says they will try to use the body wipes in those instances.  Had 2 med refusals.  Has had 2-3 episodes of urinating on herself or defecating on herself that occurs when she is mad about something.  Jacque says patient does well at the ADT then wants to go straight to sleep when she gets home even refusing dinner at times.  If they allow her to go to sleep she will get up at 3 AM ready for the day.  If they try to keep her up she will sit and scream up to 4 hours at a time.  She has had recent intractable seizure 2 weeks ago had to go to the  ER.  No med changes were made.  None since.  Does have pseudo seizures when she is mad as well.  No negative side effects to the meds noted.  No tremors.  Body mass index is 22.33 kg/m². Weight loss 7 lbs since last visit.  Total weight loss of 16 lbs since December.  Jacque says pt does not exhibit well appetite and just picks at food and refuses dinner at times.  She has not had any physical aggression with staff.  States pt does like to sleep in her helmet            The following portions of the patient's history were reviewed and updated as appropriate: allergies, current medications, past family history, past medical history, past social history, past surgical history and problem list.    Review of Systems   Constitutional: Negative for activity change, appetite change and fatigue.   HENT: Negative.    Eyes: Negative for visual disturbance.   Respiratory: Negative.    Cardiovascular: Negative.    Gastrointestinal: Negative for nausea.   Endocrine: Negative.  "   Genitourinary: Negative.    Musculoskeletal: Positive for gait problem. Negative for arthralgias.   Skin: Negative.    Allergic/Immunologic: Negative.    Neurological: Positive for seizures and speech difficulty. Negative for dizziness and headaches.   Hematological: Negative.    Psychiatric/Behavioral: Positive for behavioral problems  Negative for agitation, confusion, decreased concentration, dysphoric mood, hallucinations, self-injury and suicidal ideas. The patient is not nervous/anxious and is not hyperactive.      Reviewed copied data and there are no changes    Objective   Physical Exam  Vitals reviewed.   Constitutional:       Comments: She wears a helmet on her head for the drop seizures.   Musculoskeletal:      Cervical back: Normal range of motion.   Neurological:      Mental Status: She is alert.      Coordination: Coordination abnormal.      Gait: Gait abnormal.   Psychiatric:         Attention and Perception: She is inattentive.         Mood and Affect: Affect is blunt.        Behavior: Behavior is cooperative.         Cognition and Memory: Cognition is impaired.      Comments: Sits leaning forward but will sit up on command.  Face is asymmetrical. Smiles and answers simple questions.  Must be supported to walk.   She did smile at me today and was interactive even giving me a bracleet she made.        There were no vitals taken for this visit.    Medication List:   Current Outpatient Medications   Medication Sig Dispense Refill    ARIPiprazole (ABILIFY) 10 MG tablet Take 1 tablet by mouth Daily. 31 tablet 2    bisacodyl (DULCOLAX) 5 MG EC tablet Take 5 mg by mouth Daily As Needed for Constipation.      citalopram (CeleXA) 20 MG tablet Take 1 tablet by mouth Daily. 31 tablet 2    cloBAZam (ONFI) 10 MG tablet Take 2 tablets by mouth.      cloBAZam (ONFI) 20 MG tablet 35 mg.      divalproex (DEPAKOTE) 500 MG DR tablet Take 2 tablets by mouth.      FeroSul 325 (65 Fe) MG tablet Take 1 tablet by mouth  Daily.      furosemide (LASIX) 20 MG tablet Take 1 tablet by mouth Daily.      hydrOXYzine (ATARAX) 25 MG tablet Take 1 tablet by mouth 2 (Two) Times a Day. 60 tablet 2    levETIRAcetam (KEPPRA) 1000 MG tablet Take 2 tablets by mouth.      Levonorgest-Eth Estrad 91-Day (Jaimiess) 0.15-0.03 &0.01 MG Take 1 tablet by mouth Daily.      levothyroxine (SYNTHROID, LEVOTHROID) 75 MCG tablet Take 1 tablet by mouth Daily.      multivitamin with minerals tablet tablet Take 1 tablet by mouth Daily.      polyethylene glycol (MIRALAX) 17 GM/SCOOP powder Take 17 g by mouth Daily.      potassium chloride 10 MEQ CR tablet Take 1 tablet by mouth Daily.      QUEtiapine (SEROquel) 100 MG tablet TAKE 1 1/2 TABLETS BY MOUTH NIGHTLY AT BEDTIME 45 tablet 2     No current facility-administered medications for this visit.     Reviewed copied data and there are no changes    Mental Status Exam:   Hygiene:   good  Cooperation:  Cooperative  Eye Contact:  Poor  Psychomotor Behavior:  Slow  Affect:  Blunted  Hopelessness: Denies  Speech:  Minimal  Thought Process:  Unable to demonstrate  Thought Content:  Unable to demonstrate  Suicidal:  None  Homicidal:  None  Hallucinations:  None  Delusion:  None  Memory:  Unable to evaluate  Orientation:  Unable to evaluate  Reliability:   unable to evaluate  Insight:  Poor  Judgement:  Poor  Impulse Control:  Fair  Physical/Medical Issues:  Yes cerbral palsy., hypothyroidism, seizures    Assessment & Plan   Problems Addressed this Visit    None  Visit Diagnoses       Behavior disturbance    -  Primary    Intellectual disability        Cerebral palsy, unspecified type        Seizure disorder        Long-term use of high-risk medication              Diagnoses         Codes Comments    Behavior disturbance    -  Primary ICD-10-CM: F91.9  ICD-9-CM: 312.9     Intellectual disability     ICD-10-CM: F79  ICD-9-CM: 319     Cerebral palsy, unspecified type     ICD-10-CM: G80.9  ICD-9-CM: 343.9     Seizure disorder      ICD-10-CM: G40.909  ICD-9-CM: 345.90     Long-term use of high-risk medication     ICD-10-CM: Z79.899  ICD-9-CM: V58.69             Functionality: pt having significant impairment in important areas of daily functioning.  Prognosis: Guarded dependent on medication/follow up and treatment plan compliance.    I just do not feel the atarax is of any benefit to patient presently.  Her actions continue to be behavioral in nature.  Going to decrease the seroquel slightly to see if this has any effect at all.  If not will further continue to decrease the medication.  Staff's main concern is that she may not sleep through the night if we decrease the dosage, we will see.  The goal is to try to decrease her daytime somnolence to see if she can stay up later in the evening therefore going to bed at regular hour.  I also told staff to reach out to her neurologist and give give them the depakote level 141 done on 4/1 in the ER to ensure they do not want another level obtained.  She will continue the abilify for negative behaviors.  Continue the seroquel for sleep but decrease the dosage to 100mg.  Stop the hydroxyzine.     Staff will notify me sooner should any problems develop.        Coordinator agreeable to call the Clinic with worsening symptoms.    Coordinator is  aware to call 911 or go to the nearest ER should begin having uncontrollable behaviors.   RTC 4 months.             This document has been electronically signed by KATE Zhang on   April 15, 2024 12:52 EDT.

## 2024-04-17 ENCOUNTER — APPOINTMENT (OUTPATIENT)
Dept: GENERAL RADIOLOGY | Facility: HOSPITAL | Age: 29
End: 2024-04-17
Payer: MEDICARE

## 2024-04-17 ENCOUNTER — APPOINTMENT (OUTPATIENT)
Dept: CT IMAGING | Facility: HOSPITAL | Age: 29
End: 2024-04-17
Payer: MEDICARE

## 2024-04-17 ENCOUNTER — HOSPITAL ENCOUNTER (EMERGENCY)
Facility: HOSPITAL | Age: 29
Discharge: HOME | End: 2024-04-17
Attending: EMERGENCY MEDICINE
Payer: MEDICARE

## 2024-04-17 VITALS
OXYGEN SATURATION: 99 % | HEIGHT: 57 IN | DIASTOLIC BLOOD PRESSURE: 64 MMHG | BODY MASS INDEX: 22.26 KG/M2 | TEMPERATURE: 97.4 F | WEIGHT: 103.2 LBS | RESPIRATION RATE: 16 BRPM | HEART RATE: 79 BPM | SYSTOLIC BLOOD PRESSURE: 96 MMHG

## 2024-04-17 DIAGNOSIS — N39.0 URINARY TRACT INFECTION WITHOUT HEMATURIA, SITE UNSPECIFIED: ICD-10-CM

## 2024-04-17 DIAGNOSIS — S40.022A CONTUSION OF LEFT UPPER EXTREMITY, INITIAL ENCOUNTER: ICD-10-CM

## 2024-04-17 DIAGNOSIS — M25.571 BILATERAL ANKLE PAIN, UNSPECIFIED CHRONICITY: ICD-10-CM

## 2024-04-17 DIAGNOSIS — M25.572 BILATERAL ANKLE PAIN, UNSPECIFIED CHRONICITY: ICD-10-CM
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
## 2024-04-17 LAB
B-HCG UR QL: NEGATIVE
BACTERIA UR QL AUTO: ABNORMAL /HPF
BILIRUB UR QL STRIP: NEGATIVE
CLARITY UR: CLEAR
COLOR UR: ABNORMAL
GLUCOSE UR STRIP-MCNC: NEGATIVE MG/DL
HGB UR QL STRIP.AUTO: NEGATIVE
HYALINE CASTS UR QL AUTO: ABNORMAL /LPF
KETONES UR QL STRIP: ABNORMAL
LEUKOCYTE ESTERASE UR QL STRIP.AUTO: ABNORMAL
NITRITE UR QL STRIP: NEGATIVE
PH UR STRIP.AUTO: 5.5 [PH] (ref 5–8)
PROT UR QL STRIP: NEGATIVE
RBC # UR STRIP: ABNORMAL /HPF
REF LAB TEST METHOD: ABNORMAL
SP GR UR STRIP: >=1.03 (ref 1–1.03)
SQUAMOUS #/AREA URNS HPF: ABNORMAL /HPF
TRANS CELLS #/AREA URNS HPF: ABNORMAL /HPF
UROBILINOGEN UR QL STRIP: ABNORMAL
WBC # UR STRIP: ABNORMAL /HPF

## 2024-04-17 PROCEDURE — 87086 URINE CULTURE/COLONY COUNT: CPT

## 2024-04-17 PROCEDURE — 73060 X-RAY EXAM OF HUMERUS: CPT

## 2024-04-17 PROCEDURE — 72131 CT LUMBAR SPINE W/O DYE: CPT

## 2024-04-17 PROCEDURE — 81025 URINE PREGNANCY TEST: CPT

## 2024-04-17 PROCEDURE — 73610 X-RAY EXAM OF ANKLE: CPT

## 2024-04-17 PROCEDURE — P9612 CATHETERIZE FOR URINE SPEC: HCPCS

## 2024-04-17 PROCEDURE — 81001 URINALYSIS AUTO W/SCOPE: CPT

## 2024-04-17 PROCEDURE — 99284 EMERGENCY DEPT VISIT MOD MDM: CPT

## 2024-04-17 RX ORDER — CEPHALEXIN 500 MG/1
500 CAPSULE ORAL 2 TIMES DAILY
Qty: 14 CAPSULE | Refills: 0 | Status: SHIPPED | OUTPATIENT
Start: 2024-04-17 | End: 2024-04-24

## 2024-04-17 RX ORDER — CEPHALEXIN 250 MG/1
500 CAPSULE ORAL ONCE
Status: COMPLETED | OUTPATIENT
Start: 2024-04-17 | End: 2024-04-17

## 2024-04-17 RX ORDER — ACETAMINOPHEN 325 MG/1
975 TABLET ORAL ONCE
Status: COMPLETED | OUTPATIENT
Start: 2024-04-17 | End: 2024-04-17

## 2024-04-17 RX ADMIN — CEPHALEXIN 500 MG: 250 CAPSULE ORAL at 18:24

## 2024-04-17 RX ADMIN — ACETAMINOPHEN 975 MG: 325 TABLET, FILM COATED ORAL at 16:32
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
## 2024-04-17 NOTE — ED NOTES
April contacted per Awilda KRAFT request. Transferred call to [facility] at this time.
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
## 2024-04-18 NOTE — DISCHARGE INSTRUCTIONS
If you notice any concerning symptoms, please return to the ER immediately. These can include but are not limited to: worsening of you condition, fevers, chills, shortness of breath, vomiting, weakness of the extremities, changes in your mental status, numbness, pale extremities, or chest pain.     Take medications as prescribed, your pharmacist may have additional recommendations concerning these medications.    For pain use ibuprofen (Motrin) or acetaminophen (Tylenol), unless prescribed medications that also contain these medications.  You can take over the counter acetaminophen or ibuprofen, please follow the directions as dosages differ. Do not take ibuprofen if you have a history of peptic ulcers, kidney disease, bariatric surgery, or are currently pregnant.  Do not take Tylenol if you have a history of liver disease or alcohol use disorder.        THANK YOU!!! From [facility] Emergency Department    On behalf of the Emergency Department staff at [facility], I would like to thank you for giving us the opportunity to address your health care needs and concerns. We hope that during your visit, our service was delivered in a professional and caring manner. Please keep [facility] in mind as we walk with you down the path to your own personal wellness. Please expect follow-up phone calls concerning additional care and questions about your experience.      You have received additional information specific to your diagnosis in these discharge instructions, please read these fully.  Anytime you have been seen in the emergency department we recommend close follow up with your primary care provider or specialist, please follow these directions as indicated.        Patient may need to have all her night medications when she arrives, I approve giving these medications when she arrives back to her facility.

## 2024-04-19 LAB — BACTERIA SPEC AEROBE CULT: NO GROWTH

## 2024-04-22 ENCOUNTER — APPOINTMENT (OUTPATIENT)
Dept: CT IMAGING | Facility: HOSPITAL | Age: 29
End: 2024-04-22
Payer: MEDICARE

## 2024-04-22 ENCOUNTER — HOSPITAL ENCOUNTER (EMERGENCY)
Facility: HOSPITAL | Age: 29
Discharge: HOME | End: 2024-04-22
Attending: EMERGENCY MEDICINE | Admitting: EMERGENCY MEDICINE
Payer: MEDICARE

## 2024-04-22 VITALS
BODY MASS INDEX: 22.26 KG/M2 | HEART RATE: 80 BPM | RESPIRATION RATE: 16 BRPM | SYSTOLIC BLOOD PRESSURE: 113 MMHG | DIASTOLIC BLOOD PRESSURE: 78 MMHG | OXYGEN SATURATION: 98 % | HEIGHT: 57 IN | WEIGHT: 103.17 LBS

## 2024-04-22 DIAGNOSIS — S09.90XA CLOSED HEAD INJURY, INITIAL ENCOUNTER: Primary | ICD-10-CM

## 2024-04-22 PROCEDURE — 70450 CT HEAD/BRAIN W/O DYE: CPT

## 2024-04-22 PROCEDURE — 99284 EMERGENCY DEPT VISIT MOD MDM: CPT

## 2024-04-22 RX ORDER — ACETAMINOPHEN 160 MG/5ML
15 SUSPENSION ORAL ONCE
Status: COMPLETED | OUTPATIENT
Start: 2024-04-22 | End: 2024-04-22

## 2024-04-22 RX ADMIN — ACETAMINOPHEN 704 MG: 160 SUSPENSION ORAL at 05:55

## 2024-04-22 NOTE — ED PROVIDER NOTES
EMERGENCY DEPARTMENT ENCOUNTER    Pt Name: Ave Correia  MRN: [id number]  Pt :   1995  Room Number:    Date of encounter:  2024  PCP: Reyna Loyd APRN  ED Provider: Dominick Moran MD    Historian: Patient and EMS      HPI:  Chief Complaint   Patient presents with    Fall          Context: Ave Correia is a 29 y.o. female who presents to the ED c/o fall, the patient had gotten up, tripped and fell backwards hit her head on a dresser.  The patient does wear helmet, was apparently wearing a soft helmet, she does have a longstanding history of cerebral palsy.  Patient complains of pain in the back of the head.  Possible loss of consciousness unclear.      PAST MEDICAL HISTORY  Past Medical History:   Diagnosis Date    Blind right eye     Cerebral palsy     Hemiparesis of right dominant side due to non-cerebrovascular etiology     Seizures          PAST SURGICAL HISTORY  Past Surgical History:   Procedure Laterality Date    DENTAL EXAMINATION UNDER ANESTHESIA W/ CLEANING AND XRAYS      Fillings placed.    IMPLANTATION VAGAL NERVE STIMULATOR           FAMILY HISTORY  No family history on file.      SOCIAL HISTORY  Social History     Socioeconomic History    Marital status: Single   Tobacco Use    Smoking status: Never    Smokeless tobacco: Never   Vaping Use    Vaping status: Never Used   Substance and Sexual Activity    Alcohol use: Never    Drug use: Never         ALLERGIES  Versed [midazolam]        REVIEW OF SYSTEMS  Review of Systems   Constitutional:  Negative for chills and fever.   HENT:  Negative for sore throat and trouble swallowing.    Eyes:  Negative for pain and redness.   Respiratory:  Negative for cough and shortness of breath.    Cardiovascular:  Negative for chest pain and leg swelling.   Gastrointestinal:  Negative for abdominal pain, nausea and vomiting.   Genitourinary:  Negative for dysuria and urgency.   Musculoskeletal:  Negative for back pain and neck pain.    Skin:  Negative for rash and wound.   Neurological:  Positive for headaches. Negative for dizziness and weakness.        All systems reviewed and negative except for those discussed in HPI.       PHYSICAL EXAM    I have reviewed the triage vital signs and nursing notes.    ED Triage Vitals   Temp Pulse Resp BP SpO2   -- -- -- -- --      Temp src Heart Rate Source Patient Position BP Location FiO2 (%)   -- -- -- -- --       Physical Exam  Constitutional:       Appearance: Normal appearance. She is not ill-appearing.   HENT:      Head: Normocephalic and atraumatic.      Comments: Helmet in place, some tenderness to the back of the head     Right Ear: External ear normal.      Left Ear: External ear normal.      Nose: Nose normal.      Mouth/Throat:      Mouth: Mucous membranes are moist.      Pharynx: Oropharynx is clear.   Eyes:      Extraocular Movements: Extraocular movements intact.      Conjunctiva/sclera: Conjunctivae normal.      Pupils: Pupils are equal, round, and reactive to light.   Cardiovascular:      Rate and Rhythm: Normal rate and regular rhythm.      Pulses:           Radial pulses are 2+ on the right side and 2+ on the left side.   Pulmonary:      Effort: Pulmonary effort is normal.      Breath sounds: Normal breath sounds.   Abdominal:      General: There is no distension.      Palpations: Abdomen is soft.      Tenderness: There is no abdominal tenderness.   Musculoskeletal:         General: No tenderness or deformity. Normal range of motion.      Cervical back: Normal range of motion and neck supple.      Right lower leg: No edema.      Left lower leg: No edema.   Skin:     General: Skin is warm and dry.      Capillary Refill: Capillary refill takes less than 2 seconds.   Neurological:      General: No focal deficit present.      Mental Status: She is alert and oriented to person, place, and time.            LAB RESULTS  No results found for this or any previous visit (from the past 24  hour(s)).    If labs were ordered, I independently reviewed the results and considered them in treating the patient.        RADIOLOGY  No Radiology Exams Resulted Within Past 24 Hours        PROCEDURES    Procedures    Interpretations    O2 Sat: The patients oxygen saturation was 97% on Room Air.  This was independently interpreted by me as Normal      Radiology: I ordered and independently reviewed the above noted radiographic studies.  I viewed images of CT Head which showed No intracranial hemorrhage per my independent interpretation. See radiologist's dictation for official interpretation.       MEDICATIONS GIVEN IN ER    Medications   acetaminophen (TYLENOL) 160 MG/5ML suspension 704 mg (has no administration in time range)         MEDICAL DECISION MAKING, PROGRESS, and CONSULTS    All labs, if obtained, have been independently reviewed by me.  All radiology studies, if obtained, have been reviewed by me and the radiologist dictating the report.  All EKG's, if obtained, have been independently viewed and interpreted by me      Discussion below represents my analysis of pertinent findings related to patient's condition, differential diagnosis, treatment plan and final disposition.      Differential diagnosis:    29-year-old female presenting to the ED with head trauma.  The patient has a history of cerebral palsy, here frequently for falls, patient has pain in the back of the head, no gross deformities of the skull, no cervical spine tenderness.  Will obtain CT scan of the head to rule out intracranial injury.    Additional Sources:  External (non-ED) record review:  Psychiatry note 6/29/2023, recent social work note 4/17/2024   Chronic or social conditions impacting care:  Cerebral palsy      Orders placed during this visit:  Orders Placed This Encounter   Procedures    CT Head Without Contrast         Additional orders considered but not ordered:  None    ED Course:    Consultants:  None    ED Course as of  04/22/24 0548   Mon Apr 22, 2024   0547 CT Head Without Contrast  CT appears unchanged from multiple prior CTs, no significant signs of trauma. [CS]   0547 Patient given Tylenol for headache, she had no seizure-like activity here in the ED, she is awake and alert certainly not postictal.  Given that the patient is awake, alert and at her baseline stable for discharge back to group home with caregiver [CS]      ED Course User Index  [CS] Domincik Moran MD           After my consideration of clinical presentation and any laboratory/radiology studies obtained, I discussed the findings with the patient/patient representative who is in agreement with the treatment plan and the final disposition. Risks and benefits of discharge were discussed.     AS OF 05:48 EDT VITALS:    BP -    HR -    TEMP -    O2 SATS -      I reviewed the patients prescription monitoring report if available prior to discharge    DIAGNOSIS  Final diagnoses:   Closed head injury, initial encounter         DISPOSITION  ED Disposition       ED Disposition   Discharge    Condition   Stable    Comment   --                   Please note that portions of this document were completed with voice recognition software.        Dominick Moran MD  04/22/24 0548

## 2024-04-22 NOTE — ED NOTES
Caregiver verbalizes understanding of discharge and follow up instructions. Pt pushed out of the ED by wheelchair without distress to awaiting car.

## 2024-05-15 ENCOUNTER — TRANSCRIBE ORDERS (OUTPATIENT)
Dept: LAB | Facility: HOSPITAL | Age: 29
End: 2024-05-15
Payer: MEDICARE

## 2024-05-15 ENCOUNTER — LAB (OUTPATIENT)
Dept: LAB | Facility: HOSPITAL | Age: 29
End: 2024-05-15
Payer: MEDICARE

## 2024-05-15 ENCOUNTER — HOSPITAL ENCOUNTER (OUTPATIENT)
Dept: GENERAL RADIOLOGY | Facility: HOSPITAL | Age: 29
Discharge: HOME | End: 2024-05-15
Payer: MEDICARE

## 2024-05-15 DIAGNOSIS — Z79.899 LONG-TERM USE OF HIGH-RISK MEDICATION: ICD-10-CM

## 2024-05-15 DIAGNOSIS — E03.9 MYXEDEMA HEART DISEASE: ICD-10-CM

## 2024-05-15 DIAGNOSIS — I51.9 MYXEDEMA HEART DISEASE: ICD-10-CM

## 2024-05-15 DIAGNOSIS — D64.9 ANEMIA, UNSPECIFIED TYPE: ICD-10-CM

## 2024-05-15 DIAGNOSIS — G40.919 EPILEPSY WITH ALTERED CONSCIOUSNESS WITH INTRACTABLE EPILEPSY: Primary | ICD-10-CM

## 2024-05-15 DIAGNOSIS — R07.9 CHEST PAIN, UNSPECIFIED TYPE: ICD-10-CM

## 2024-05-15 DIAGNOSIS — R07.9 CHEST PAIN, UNSPECIFIED TYPE: Primary | ICD-10-CM

## 2024-05-15 DIAGNOSIS — G40.919 EPILEPSY WITH ALTERED CONSCIOUSNESS WITH INTRACTABLE EPILEPSY: ICD-10-CM

## 2024-05-15 LAB
25(OH)D3 SERPL-MCNC: 63.3 NG/ML (ref 30–100)
ALBUMIN SERPL-MCNC: 4.4 G/DL (ref 3.5–5.2)
ALBUMIN/GLOB SERPL: 1.8 G/DL
ALP SERPL-CCNC: 46 U/L (ref 39–117)
ALT SERPL W P-5'-P-CCNC: 10 U/L (ref 1–33)
ANION GAP SERPL CALCULATED.3IONS-SCNC: 9 MMOL/L (ref 5–15)
AST SERPL-CCNC: 12 U/L (ref 1–32)
BASOPHILS # BLD AUTO: 0.02 10*3/MM3 (ref 0–0.2)
BASOPHILS NFR BLD AUTO: 0.3 % (ref 0–1.5)
BILIRUB SERPL-MCNC: 0.2 MG/DL (ref 0–1.2)
BUN SERPL-MCNC: 10 MG/DL (ref 6–20)
BUN/CREAT SERPL: 12.8 (ref 7–25)
CALCIUM SPEC-SCNC: 9.2 MG/DL (ref 8.6–10.5)
CHLORIDE SERPL-SCNC: 104 MMOL/L (ref 98–107)
CHOLEST SERPL-MCNC: 187 MG/DL (ref 0–200)
CO2 SERPL-SCNC: 28 MMOL/L (ref 22–29)
CREAT SERPL-MCNC: 0.78 MG/DL (ref 0.57–1)
DEPRECATED RDW RBC AUTO: 45.7 FL (ref 37–54)
EGFRCR SERPLBLD CKD-EPI 2021: 105.6 ML/MIN/1.73
EOSINOPHIL # BLD AUTO: 0.02 10*3/MM3 (ref 0–0.4)
EOSINOPHIL NFR BLD AUTO: 0.3 % (ref 0.3–6.2)
ERYTHROCYTE [DISTWIDTH] IN BLOOD BY AUTOMATED COUNT: 12.6 % (ref 12.3–15.4)
FERRITIN SERPL-MCNC: 297 NG/ML (ref 13–150)
FOLATE SERPL-MCNC: 10.6 NG/ML (ref 4.78–24.2)
GLOBULIN UR ELPH-MCNC: 2.5 GM/DL
GLUCOSE SERPL-MCNC: 92 MG/DL (ref 65–99)
HBA1C MFR BLD: 4.8 % (ref 4.8–5.6)
HCT VFR BLD AUTO: 32.3 % (ref 34–46.6)
HDLC SERPL-MCNC: 92 MG/DL (ref 40–60)
HGB BLD-MCNC: 10.8 G/DL (ref 12–15.9)
IMM GRANULOCYTES # BLD AUTO: 0.01 10*3/MM3 (ref 0–0.05)
IMM GRANULOCYTES NFR BLD AUTO: 0.2 % (ref 0–0.5)
IRON 24H UR-MRATE: 110 MCG/DL (ref 37–145)
IRON SATN MFR SERPL: 32 % (ref 20–50)
LDLC SERPL CALC-MCNC: 84 MG/DL (ref 0–100)
LDLC/HDLC SERPL: 0.9 {RATIO}
LYMPHOCYTES # BLD AUTO: 3.22 10*3/MM3 (ref 0.7–3.1)
LYMPHOCYTES NFR BLD AUTO: 55 % (ref 19.6–45.3)
MAGNESIUM SERPL-MCNC: 2 MG/DL (ref 1.6–2.6)
MCH RBC QN AUTO: 33.3 PG (ref 26.6–33)
MCHC RBC AUTO-ENTMCNC: 33.4 G/DL (ref 31.5–35.7)
MCV RBC AUTO: 99.7 FL (ref 79–97)
MONOCYTES # BLD AUTO: 0.19 10*3/MM3 (ref 0.1–0.9)
MONOCYTES NFR BLD AUTO: 3.2 % (ref 5–12)
NEUTROPHILS NFR BLD AUTO: 2.39 10*3/MM3 (ref 1.7–7)
NEUTROPHILS NFR BLD AUTO: 41 % (ref 42.7–76)
NRBC BLD AUTO-RTO: 0 /100 WBC (ref 0–0.2)
PLATELET # BLD AUTO: 132 10*3/MM3 (ref 140–450)
PMV BLD AUTO: 11.5 FL (ref 6–12)
POTASSIUM SERPL-SCNC: 4.7 MMOL/L (ref 3.5–5.2)
PROT SERPL-MCNC: 6.9 G/DL (ref 6–8.5)
RBC # BLD AUTO: 3.24 10*6/MM3 (ref 3.77–5.28)
SODIUM SERPL-SCNC: 141 MMOL/L (ref 136–145)
TIBC SERPL-MCNC: 346 MCG/DL (ref 298–536)
TRANSFERRIN SERPL-MCNC: 232 MG/DL (ref 200–360)
TRIGL SERPL-MCNC: 60 MG/DL (ref 0–150)
TSH SERPL DL<=0.05 MIU/L-ACNC: 1.15 UIU/ML (ref 0.27–4.2)
VALPROATE SERPL-MCNC: 112 MCG/ML (ref 50–125)
VIT B12 BLD-MCNC: 591 PG/ML (ref 211–946)
VLDLC SERPL-MCNC: 11 MG/DL (ref 5–40)
WBC NRBC COR # BLD AUTO: 5.85 10*3/MM3 (ref 3.4–10.8)

## 2024-05-15 PROCEDURE — 71046 X-RAY EXAM CHEST 2 VIEWS: CPT

## 2024-05-15 PROCEDURE — 85025 COMPLETE CBC W/AUTO DIFF WBC: CPT

## 2024-05-15 PROCEDURE — 80053 COMPREHEN METABOLIC PANEL: CPT

## 2024-05-15 PROCEDURE — 82728 ASSAY OF FERRITIN: CPT

## 2024-05-15 PROCEDURE — 36415 COLL VENOUS BLD VENIPUNCTURE: CPT

## 2024-05-15 PROCEDURE — 82607 VITAMIN B-12: CPT

## 2024-05-15 PROCEDURE — 82746 ASSAY OF FOLIC ACID SERUM: CPT

## 2024-05-15 PROCEDURE — 83735 ASSAY OF MAGNESIUM: CPT

## 2024-05-15 PROCEDURE — 84466 ASSAY OF TRANSFERRIN: CPT

## 2024-05-15 PROCEDURE — 83540 ASSAY OF IRON: CPT

## 2024-05-15 PROCEDURE — 80061 LIPID PANEL: CPT

## 2024-05-15 PROCEDURE — 84443 ASSAY THYROID STIM HORMONE: CPT

## 2024-05-15 PROCEDURE — 83036 HEMOGLOBIN GLYCOSYLATED A1C: CPT

## 2024-05-15 PROCEDURE — 82306 VITAMIN D 25 HYDROXY: CPT

## 2024-05-15 PROCEDURE — 80164 ASSAY DIPROPYLACETIC ACD TOT: CPT

## 2024-05-16 ENCOUNTER — OFFICE VISIT (OUTPATIENT)
Dept: PSYCHIATRY | Facility: CLINIC | Age: 29
End: 2024-05-16
Payer: MEDICARE

## 2024-05-16 VITALS
SYSTOLIC BLOOD PRESSURE: 117 MMHG | OXYGEN SATURATION: 99 % | HEART RATE: 99 BPM | DIASTOLIC BLOOD PRESSURE: 80 MMHG | TEMPERATURE: 97.8 F | BODY MASS INDEX: 21.75 KG/M2 | WEIGHT: 100.8 LBS | HEIGHT: 57 IN

## 2024-05-16 DIAGNOSIS — F91.9 BEHAVIOR DISTURBANCE: ICD-10-CM

## 2024-05-16 DIAGNOSIS — G40.909 SEIZURE DISORDER: ICD-10-CM

## 2024-05-16 DIAGNOSIS — F79 INTELLECTUAL DISABILITY: Primary | ICD-10-CM

## 2024-05-16 DIAGNOSIS — G80.9 CEREBRAL PALSY, UNSPECIFIED TYPE: ICD-10-CM

## 2024-05-16 DIAGNOSIS — Z79.899 LONG-TERM USE OF HIGH-RISK MEDICATION: ICD-10-CM

## 2024-05-16 PROCEDURE — 1160F RVW MEDS BY RX/DR IN RCRD: CPT | Performed by: NURSE PRACTITIONER

## 2024-05-16 PROCEDURE — 1159F MED LIST DOCD IN RCRD: CPT | Performed by: NURSE PRACTITIONER

## 2024-05-16 PROCEDURE — 99214 OFFICE O/P EST MOD 30 MIN: CPT | Performed by: NURSE PRACTITIONER

## 2024-05-16 RX ORDER — HYDROXYZINE HYDROCHLORIDE 25 MG/1
25 TABLET, FILM COATED ORAL 2 TIMES DAILY
Qty: 60 TABLET | Refills: 2 | Status: SHIPPED | OUTPATIENT
Start: 2024-05-16

## 2024-05-16 RX ORDER — QUETIAPINE FUMARATE 100 MG/1
100 TABLET, FILM COATED ORAL NIGHTLY
Qty: 30 TABLET | Refills: 2 | Status: SHIPPED | OUTPATIENT
Start: 2024-05-16

## 2024-05-16 RX ORDER — ARIPIPRAZOLE 10 MG/1
10 TABLET ORAL DAILY
Qty: 30 TABLET | Refills: 2 | Status: SHIPPED | OUTPATIENT
Start: 2024-05-16

## 2024-05-16 RX ORDER — CITALOPRAM 20 MG/1
20 TABLET ORAL DAILY
Qty: 31 TABLET | Refills: 2 | Status: SHIPPED | OUTPATIENT
Start: 2024-05-16
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
## 2024-05-23 ENCOUNTER — TELEPHONE (OUTPATIENT)
Dept: FAMILY MEDICINE CLINIC | Facility: CLINIC | Age: 29
End: 2024-05-23
Payer: MEDICARE
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
## 2024-05-27 ENCOUNTER — HOSPITAL ENCOUNTER (EMERGENCY)
Facility: HOSPITAL | Age: 29
Discharge: HOME | End: 2024-05-27
Attending: STUDENT IN AN ORGANIZED HEALTH CARE EDUCATION/TRAINING PROGRAM | Admitting: STUDENT IN AN ORGANIZED HEALTH CARE EDUCATION/TRAINING PROGRAM
Payer: MEDICARE

## 2024-05-27 VITALS
HEART RATE: 87 BPM | SYSTOLIC BLOOD PRESSURE: 114 MMHG | DIASTOLIC BLOOD PRESSURE: 75 MMHG | OXYGEN SATURATION: 97 % | HEIGHT: 57 IN | RESPIRATION RATE: 16 BRPM | TEMPERATURE: 97.9 F | WEIGHT: 100 LBS | BODY MASS INDEX: 21.57 KG/M2
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
## 2024-05-27 DIAGNOSIS — S01.512A LACERATION OF MOUTH, INITIAL ENCOUNTER: ICD-10-CM

## 2024-05-27 PROCEDURE — 99282 EMERGENCY DEPT VISIT SF MDM: CPT

## 2024-05-27 NOTE — DISCHARGE INSTRUCTIONS
Please have patient follow-up with her dentist in the next 7 days.    Return patient to the emergency room immediately if she experiences any loss of consciousness, somnolence, lethargy, vomiting episodes, seizures, return of bleeding from the mouth wound, or for any other complaint

## 2024-05-27 NOTE — ED PROVIDER NOTES
EMERGENCY DEPARTMENT ENCOUNTER    Pt Name: Ave Correia  MRN: [id number]  Pt :   1995  Room Number:  24SF/24  Date of encounter:  2024  PCP: Reyna Loyd APRN  ED Provider: Jordan Louie PA-C    Historian: Patient, [facility] staff member, nursing notes      HPI:  Chief Complaint: Mouth wound        Context: Ave Correia is a 29 y.o. female who presents to the ED c/o laceration from a bite wound on the patient's inner lower lip.  Staff member from [facility] which is the [facility] home that the patient resides in mentions that the patient had a fall from a height of standing at around 8:30 AM.  Staff member reports there was notable bleeding from the patient's mouth after the fall.  Patient has been complaining of mouth pain since the fall.  Staff member reports patient accidentally tripped and fell face forward.  Staff member denies any loss of consciousness, vomiting, lethargy, somnolence, behavior change, or any other complaint since the fall.      PAST MEDICAL HISTORY  Past Medical History:   Diagnosis Date    Blind right eye     Cerebral palsy     Hemiparesis of right dominant side due to non-cerebrovascular etiology     Seizures          PAST SURGICAL HISTORY  Past Surgical History:   Procedure Laterality Date    DENTAL EXAMINATION UNDER ANESTHESIA W/ CLEANING AND XRAYS      Fillings placed.    IMPLANTATION VAGAL NERVE STIMULATOR           FAMILY HISTORY  History reviewed. No pertinent family history.      SOCIAL HISTORY  Social History     Socioeconomic History    Marital status: Single   Tobacco Use    Smoking status: Never    Smokeless tobacco: Never   Vaping Use    Vaping status: Never Used   Substance and Sexual Activity    Alcohol use: Never    Drug use: Never         ALLERGIES  Versed [midazolam]        REVIEW OF SYSTEMS  Review of Systems   Constitutional:  Negative for chills and fever.   HENT:  Negative for nosebleeds.    Respiratory:  Negative for cough and  shortness of breath.    Gastrointestinal:  Negative for abdominal pain, nausea and vomiting.   Musculoskeletal:  Negative for back pain, neck pain and neck stiffness.   Skin:  Positive for wound.   Neurological:  Negative for dizziness, seizures, syncope, light-headedness and headaches.   Psychiatric/Behavioral:  Negative for confusion.    All other systems reviewed and are negative.         All systems reviewed and negative except for those discussed in HPI.       PHYSICAL EXAM    I have reviewed the triage vital signs and nursing notes.    ED Triage Vitals [05/27/24 1028]   Temp Heart Rate Resp BP SpO2   97.9 °F (36.6 °C) 87 16 114/75 97 %      Temp src Heart Rate Source Patient Position BP Location FiO2 (%)   -- -- -- -- --       Physical Exam  Vitals and nursing note reviewed.   Constitutional:       General: She is not in acute distress.     Appearance: She is not ill-appearing, toxic-appearing or diaphoretic.   HENT:      Head: Normocephalic and atraumatic.      Mouth/Throat:      Lips: Pink.      Mouth: Mucous membranes are moist. Lacerations present.      Dentition: Normal dentition. Dental caries present. No dental tenderness or dental abscesses.      Tongue: No lesions.      Palate: No mass.      Pharynx: Oropharynx is clear. Uvula midline. No uvula swelling.      Tonsils: No tonsillar exudate or tonsillar abscesses. 0 on the right. 0 on the left.        Comments: No appreciable laxity of any of the upper or lower teeth.  Eyes:      Extraocular Movements: Extraocular movements intact.   Cardiovascular:      Rate and Rhythm: Normal rate.      Heart sounds: Normal heart sounds.   Pulmonary:      Effort: Pulmonary effort is normal. No respiratory distress.      Breath sounds: Normal breath sounds.   Abdominal:      Tenderness: There is no abdominal tenderness.   Musculoskeletal:      Right shoulder: No bony tenderness.      Left shoulder: No bony tenderness.      Right upper arm: No tenderness.      Left  upper arm: No tenderness.      Right elbow: No tenderness.      Left elbow: No tenderness.      Right forearm: No bony tenderness.      Left forearm: No bony tenderness.      Right wrist: No bony tenderness.      Left wrist: No bony tenderness.      Right hand: Normal.      Left hand: Normal.      Cervical back: Full passive range of motion without pain. No signs of trauma, rigidity or crepitus. No pain with movement, spinous process tenderness or muscular tenderness.      Thoracic back: No tenderness or bony tenderness.      Lumbar back: No tenderness or bony tenderness.   Skin:     General: Skin is warm and dry.      Findings: No rash.   Neurological:      Mental Status: She is alert.             LAB RESULTS  No results found for this or any previous visit (from the past 24 hour(s)).    If labs were ordered, I independently reviewed the results and considered them in treating the patient.        RADIOLOGY  No Radiology Exams Resulted Within Past 24 Hours      PROCEDURES    Procedures    No orders to display       MEDICATIONS GIVEN IN ER    Medications - No data to display      MEDICAL DECISION MAKING, PROGRESS, and CONSULTS    All labs, if obtained, have been independently reviewed by me.  All radiology studies, if obtained, have been reviewed by me and the radiologist dictating the report.  All EKG's, if obtained, have been independently viewed and interpreted by me/my attending physician.      Discussion below represents my analysis of pertinent findings related to patient's condition, differential diagnosis, treatment plan and final disposition.    Patient is a 29-year-old female with past medical history of cerebral palsy and right-sided hemiparesis presented to the emergency department by a staff member at the group home she currently resides due to concern for a mechanical fall which occurred at 8:30 AM.  Staff member had reported bleeding from a mouth wound and that patient has been complaining of pain in  the mouth.  No loss of consciousness, patient does not take any anticoagulant medication, there has been no vomiting lethargy somnolence or other behavior changes reported since the accident per staff member.    On exam patient's vital signs as interpreted by me are stable with a blood pressure 114/75, temperature 97.9 °F, heart rate of 87, and SpO2 of 97% on room air.  Patient is upright and alert.  Oral exam exhibits a small 0.5 cm apparent bite wound on the right lower lip with no active bleeding.  Upper and lower teeth are intact.  Per chart review, patient last had tetanus in 2018 therefore she is up-to-date.  Due to the small size of the wound and there being no active bleeding there is no indication for closure.    I did offer both the patient and staff member CT maxillofacial imaging if there was any concern for a facial bone fracture.  Staff member did not feel this was necessary and declined.  On my exam patient was not exhibiting any facial bone tenderness of either the maxilla or mandible and patient's bite was well aligned so I felt this was appropriate.  Of note patient wears a helmet at all times due to history of seizure disorder and frequent falls and staff member reports she was wearing a helmet at the time of the fall.    [place] CT Head Injury Rule-CT Unnecessary    Aftercare instructions were provided to both the patient and the staff member regarding the patient's oral wound and recommended close follow-up with dentist within the next 7 days and very strict ED return precautions for signs of concussion or intracranial injury such as vomiting, loss of consciousness, somnolence, lethargy, or any other acute behavior changes staff member understands to return the patient to the ER immediately should they develop.                     Differential diagnosis:    Differential diagnosis included but was not limited to fall, laceration, tooth injury, abrasion, ligamentous injury, among  others      Additional sources:    - Discussed/ obtained information from independent historians: Staff member and [facility] group home in addition to patient    - External (non-ED) record review: Primary care records    - Chronic or social conditions impacting care: Cerebral palsy, seizure disorder    Orders placed during this visit:  No orders of the defined types were placed in this encounter.        Additional orders considered but not ordered: Considered a CT head and CT maxillofacial scan which was offered to both the patient and the patient's caregiver they declined due to no concern for fracture or intracranial bleeding.  As patient was wearing a helmet at the time of the accident there was no loss of consciousness and there has been no behavior change, vomiting, somnolence, lethargy or other concerns for intracranial hemorrhage, and as patient had no facial bone tenderness on my exam or dental tenderness concerning for facial bone fracture, I felt this was appropriate.  [place] CT head rule algorithm per MD Calc resulted as CT unnecessary.      ED Course:    Consultants: None                Shared Decision Making:  After my consideration of clinical presentation and any laboratory/radiology studies obtained, I discussed the findings with the patient/patient representative who is in agreement with the treatment plan and the final disposition.   Risks and benefits of discharge and/or observation/admission were discussed.       AS OF 11:17 EDT VITALS:    BP - 114/75  HR - 87  TEMP - 97.9 °F (36.6 °C)  O2 SATS - 97%                  DIAGNOSIS  Final diagnoses:   Fall, initial encounter   Laceration of mouth, initial encounter         DISPOSITION  Discharge home      Please note that portions of this document were completed with voice recognition software.      Jordan Louie PA-C  05/27/24 1118

## 2024-06-06 ENCOUNTER — OFFICE VISIT (OUTPATIENT)
Dept: PSYCHIATRY | Facility: CLINIC | Age: 29
End: 2024-06-06
Payer: MEDICARE

## 2024-06-06 VITALS
BODY MASS INDEX: 21.75 KG/M2 | HEIGHT: 57 IN | HEART RATE: 94 BPM | OXYGEN SATURATION: 98 % | DIASTOLIC BLOOD PRESSURE: 74 MMHG | SYSTOLIC BLOOD PRESSURE: 107 MMHG | TEMPERATURE: 98.6 F | WEIGHT: 100.8 LBS

## 2024-06-06 DIAGNOSIS — F91.9 BEHAVIOR DISTURBANCE: ICD-10-CM

## 2024-06-06 DIAGNOSIS — F79 INTELLECTUAL DISABILITY: Primary | ICD-10-CM

## 2024-06-06 DIAGNOSIS — G80.9 CEREBRAL PALSY, UNSPECIFIED TYPE: ICD-10-CM

## 2024-06-06 DIAGNOSIS — Z79.899 LONG-TERM USE OF HIGH-RISK MEDICATION: ICD-10-CM

## 2024-06-06 DIAGNOSIS — G40.909 SEIZURE DISORDER: ICD-10-CM

## 2024-06-06 PROCEDURE — 99214 OFFICE O/P EST MOD 30 MIN: CPT | Performed by: NURSE PRACTITIONER

## 2024-06-06 PROCEDURE — 1160F RVW MEDS BY RX/DR IN RCRD: CPT | Performed by: NURSE PRACTITIONER

## 2024-06-06 PROCEDURE — 1159F MED LIST DOCD IN RCRD: CPT | Performed by: NURSE PRACTITIONER

## 2024-06-06 RX ORDER — HYDROXYZINE HYDROCHLORIDE 25 MG/1
25 TABLET, FILM COATED ORAL 3 TIMES DAILY
Qty: 90 TABLET | Refills: 2 | Status: SHIPPED | OUTPATIENT
Start: 2024-06-06

## 2024-06-06 NOTE — PROGRESS NOTES
"      Subjective   Ave Correia is a 29 y.o. female presents with Jacque.  Pt continues to live at the  [facility] (supportive living program).  Lives with 2 other individuals and supported by staff.      Chief Complaint:  Recheck on behaviors and sleep    History of Present Illness: Presents with Don staff member.  Joy says that since restarting the hydroxyzine patient has only had 2 serious negative behaviors where she was having them several times a day daily.  She continues to refuse showers and have some screaming spells also refusing to eat at times and always makes the statement I want to move\".  That has not changed.  She also will still have a BM in her diaper versus going to the actual toilet.  It is usually when she is mad about something.  She has not had any actual seizures but has had a couple of pseudoseizures.  She is tolerating the medication and Joy says that her behaviors do usually worsen of the late afternoon and she is easily agitated.Body mass index is 21.81 kg/m². No acute medical stressors.  No negative side effects to the meds.  Sleep is adequate.  Still wants to go to bed early and get up early.            The following portions of the patient's history were reviewed and updated as appropriate: allergies, current medications, past family history, past medical history, past social history, past surgical history and problem list.    Review of Systems   Constitutional: Negative for activity change, appetite change and fatigue.   HENT: Negative.    Eyes: Negative for visual disturbance.   Respiratory: Negative.    Cardiovascular: Negative.    Gastrointestinal: Negative for nausea.   Endocrine: Negative.    Genitourinary: Negative.    Musculoskeletal: Positive for gait problem. Negative for arthralgias.   Skin: Negative.    Allergic/Immunologic: Negative.    Neurological: Positive for seizures and speech difficulty. Negative for dizziness and headaches.   Hematological: Negative.  " "  Psychiatric/Behavioral: Positive for behavioral problems  Negative for agitation, confusion, decreased concentration, dysphoric mood, hallucinations, self-injury and suicidal ideas. The patient is not nervous/anxious and is not hyperactive.      Reviewed copied data and there are no changes    Objective   Physical Exam  Vitals reviewed.   Constitutional:       Comments: She wears a helmet on her head for the drop seizures.   Musculoskeletal:      Cervical back: Normal range of motion.   Neurological:      Mental Status: She is alert.      Coordination: Coordination abnormal.      Gait: Gait abnormal.   Psychiatric:         Attention and Perception: She is inattentive.         Mood and Affect: Affect is blunt.        Behavior: Behavior is cooperative.         Cognition and Memory: Cognition is impaired.      Comments: Sits leaning forward but will sit up on command.  Today would not engage in conversation with me but did smile a couple of times.          Blood pressure 107/74, pulse 94, temperature 98.6 °F (37 °C), height 144.8 cm (57.01\"), weight 45.7 kg (100 lb 12.8 oz), SpO2 98%.    Medication List:   Current Outpatient Medications   Medication Sig Dispense Refill    hydrOXYzine (ATARAX) 25 MG tablet Take 1 tablet by mouth 3 (Three) Times a Day. 90 tablet 2    ARIPiprazole (ABILIFY) 10 MG tablet Take 1 tablet by mouth Daily. 30 tablet 2    bisacodyl (DULCOLAX) 5 MG EC tablet Take 5 mg by mouth Daily As Needed for Constipation.      citalopram (CeleXA) 20 MG tablet Take 1 tablet by mouth Daily. 31 tablet 2    cloBAZam (ONFI) 10 MG tablet Take 2 tablets by mouth.      cloBAZam (ONFI) 20 MG tablet 35 mg.      divalproex (DEPAKOTE) 500 MG DR tablet Take 2 tablets by mouth.      FeroSul 325 (65 Fe) MG tablet Take 1 tablet by mouth Daily.      furosemide (LASIX) 20 MG tablet Take 1 tablet by mouth Daily.      levETIRAcetam (KEPPRA) 1000 MG tablet Take 2 tablets by mouth.      Levonorgest-Eth Estrad 91-Day (Jaimiess) " 0.15-0.03 &0.01 MG Take 1 tablet by mouth Daily.      levothyroxine (SYNTHROID, LEVOTHROID) 75 MCG tablet Take 1 tablet by mouth Daily.      multivitamin with minerals tablet tablet Take 1 tablet by mouth Daily.      polyethylene glycol (MIRALAX) 17 GM/SCOOP powder Take 17 g by mouth Daily.      potassium chloride 10 MEQ CR tablet Take 1 tablet by mouth Daily.      QUEtiapine (SEROquel) 100 MG tablet Take 1 tablet by mouth Every Night. 30 tablet 2     No current facility-administered medications for this visit.     Reviewed copied data and there are no changes    Mental Status Exam:   Hygiene:   fair  Cooperation:  Cooperative  Eye Contact:  Poor  Psychomotor Behavior:  Slow  Affect:  Blunted  Hopelessness: Denies  Speech:  Minimal  Thought Process:  Unable to demonstrate  Thought Content:  Unable to demonstrate  Suicidal:  None  Homicidal:  None  Hallucinations:  None  Delusion:  None  Memory:  Unable to evaluate  Orientation:  Unable to evaluate  Reliability:   unable to evaluate  Insight:  Poor  Judgement:  Poor  Impulse Control:  Poor  Physical/Medical Issues:  Yes cerbral palsy., hypothyroidism, seizures    Assessment & Plan   Problems Addressed this Visit    None  Visit Diagnoses       Intellectual disability    -  Primary    Cerebral palsy, unspecified type        Behavior disturbance        Relevant Medications    hydrOXYzine (ATARAX) 25 MG tablet    Seizure disorder        Long-term use of high-risk medication              Diagnoses         Codes Comments    Intellectual disability    -  Primary ICD-10-CM: F79  ICD-9-CM: 319     Cerebral palsy, unspecified type     ICD-10-CM: G80.9  ICD-9-CM: 343.9     Behavior disturbance     ICD-10-CM: F91.9  ICD-9-CM: 312.9     Seizure disorder     ICD-10-CM: G40.909  ICD-9-CM: 345.90     Long-term use of high-risk medication     ICD-10-CM: Z79.899  ICD-9-CM: V58.69             Functionality: pt having significant impairment in important areas of daily  functioning.  Prognosis: Guarded dependent on medication/follow up and treatment plan compliance.    Increasing the atarax to TID dosing.  continue the abilify for negative behaviors.  Continue the seroquel for sleep she will continue the celexa for the obsessive behaviors.not making any other changes at present.    She gets regular lab work from her PCP and they are supposed to fax me those results.          Staff member agreeable to call the Clinic with worsening symptoms.   Staff member   is  aware to call 911 or go to the nearest ER should begin having uncontrollable behaviors.   RTC 3 months.             This document has been electronically signed by KATE Zhang on   June 6, 2024 14:55 EDT.

## 2024-06-12 ENCOUNTER — APPOINTMENT (OUTPATIENT)
Dept: GENERAL RADIOLOGY | Facility: HOSPITAL | Age: 29
End: 2024-06-12
Payer: MEDICARE

## 2024-06-12 ENCOUNTER — APPOINTMENT (OUTPATIENT)
Dept: CT IMAGING | Facility: HOSPITAL | Age: 29
End: 2024-06-12
Payer: MEDICARE

## 2024-06-12 ENCOUNTER — HOSPITAL ENCOUNTER (EMERGENCY)
Facility: HOSPITAL | Age: 29
Discharge: HOME | End: 2024-06-13
Attending: EMERGENCY MEDICINE
Payer: MEDICARE
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
## 2024-06-12 DIAGNOSIS — S49.92XA INJURY OF LEFT SHOULDER, INITIAL ENCOUNTER: ICD-10-CM

## 2024-06-12 DIAGNOSIS — S19.9XXA INJURY OF NECK, INITIAL ENCOUNTER: ICD-10-CM

## 2024-06-12 PROCEDURE — 99284 EMERGENCY DEPT VISIT MOD MDM: CPT

## 2024-06-12 PROCEDURE — 72125 CT NECK SPINE W/O DYE: CPT

## 2024-06-12 PROCEDURE — 73030 X-RAY EXAM OF SHOULDER: CPT

## 2024-06-12 PROCEDURE — 70450 CT HEAD/BRAIN W/O DYE: CPT

## 2024-06-12 RX ORDER — ACETAMINOPHEN 500 MG
1000 TABLET ORAL ONCE
Status: COMPLETED | OUTPATIENT
Start: 2024-06-12 | End: 2024-06-12

## 2024-06-12 RX ADMIN — ACETAMINOPHEN 1000 MG: 500 TABLET, FILM COATED ORAL at 23:36

## 2024-06-13 VITALS
DIASTOLIC BLOOD PRESSURE: 62 MMHG | OXYGEN SATURATION: 98 % | WEIGHT: 112 LBS | TEMPERATURE: 98.1 F | BODY MASS INDEX: 25.19 KG/M2 | HEIGHT: 56 IN | RESPIRATION RATE: 12 BRPM | HEART RATE: 77 BPM | SYSTOLIC BLOOD PRESSURE: 102 MMHG

## 2024-06-13 NOTE — ED PROVIDER NOTES
EMERGENCY DEPARTMENT ENCOUNTER    Pt Name: Ave Correia  MRN: [id number]  Pt :   1995  Room Number:  1515  Date of encounter:  2024  PCP: Reyna Loyd APRN  ED Provider: Jose M Minor PA-C    Historian:  Caregiver      HPI:  Chief Complaint   Patient presents with    Fall    Neck Pain          Context: Ave Correia is a 29 y.o. female who presents to the ED c/o a fall with neck injury and left shoulder injury.  Patient has a history of cerebral palsy, hemiparesis of right dominant side due to non-cerebral vascular etiology and seizures.  Patient reportedly had a fall from standing position and struck her head and neck and left shoulder on the ground.  There is no reported LOC.  This happened several hours prior to arrival.      PAST MEDICAL HISTORY  Past Medical History:   Diagnosis Date    Blind right eye     Cerebral palsy     Hemiparesis of right dominant side due to non-cerebrovascular etiology     Seizures          PAST SURGICAL HISTORY  Past Surgical History:   Procedure Laterality Date    DENTAL EXAMINATION UNDER ANESTHESIA W/ CLEANING AND XRAYS      Fillings placed.    IMPLANTATION VAGAL NERVE STIMULATOR           FAMILY HISTORY  No family history on file.      SOCIAL HISTORY  Social History     Socioeconomic History    Marital status: Single   Tobacco Use    Smoking status: Never    Smokeless tobacco: Never   Vaping Use    Vaping status: Never Used   Substance and Sexual Activity    Alcohol use: Never    Drug use: Never         ALLERGIES  Versed [midazolam]        REVIEW OF SYSTEMS  Review of Systems   Constitutional: Negative.    HENT: Negative.     Eyes: Negative.    Respiratory: Negative.     Cardiovascular: Negative.    Gastrointestinal: Negative.    Genitourinary: Negative.    Musculoskeletal:         Left shoulder pain, neck pain   Skin: Negative.    Neurological: Negative.  Negative for headaches.   Psychiatric/Behavioral: Negative.          All systems reviewed and  negative except for those discussed in HPI.       PHYSICAL EXAM    I have reviewed the triage vital signs and nursing notes.    ED Triage Vitals   Temp Heart Rate Resp BP SpO2   06/12/24 2242 06/12/24 2239 06/12/24 2244 06/12/24 2244 06/12/24 2239   98.1 °F (36.7 °C) 78 12 103/66 98 %      Temp src Heart Rate Source Patient Position BP Location FiO2 (%)   06/12/24 2242 -- -- -- --   Oral           Physical Exam  Vitals and nursing note reviewed.   HENT:      Head:      Comments: Helmet in place on arrival     Nose: Nose normal.   Eyes:      Extraocular Movements: Extraocular movements intact.   Neck:      Comments: Mild tenderness to the posterior cervical spine diffusely  Cardiovascular:      Rate and Rhythm: Normal rate.   Pulmonary:      Effort: Pulmonary effort is normal.   Abdominal:      General: Abdomen is flat.   Musculoskeletal:      Comments: Chronic left-sided contracture to the left upper extremity, tenderness to palpation of the left shoulder diffusely.   Skin:     General: Skin is warm and dry.   Neurological:      General: No focal deficit present.      Mental Status: She is alert. Mental status is at baseline.   Psychiatric:         Mood and Affect: Mood normal.         Behavior: Behavior normal.            LAB RESULTS  No results found for this or any previous visit (from the past 24 hour(s)).    If labs were ordered, I independently reviewed the results and considered them in treating the patient.        RADIOLOGY  CT Cervical Spine Without Contrast    Result Date: 6/13/2024  FINAL REPORT TECHNIQUE: null CLINICAL HISTORY: fall, neck injury COMPARISON: null FINDINGS: CT cervical spine without contrast Comparison: None Findings: No acute fracture or dislocation is demonstrated. Posterior alignment is normal throughout. Multilevel mild degenerative change noted. Cardiac monitor in left chest.     Impression: No acute processes. Authenticated and Electronically Signed by Trever Dumont MD on  06/13/2024 12:37:29 AM    CT Head Without Contrast    Result Date: 6/13/2024  FINAL REPORT TECHNIQUE: null CLINICAL HISTORY: fall, head injury COMPARISON: null FINDINGS: CT head without contrast Comparison: 04/22/2024 Findings: No intracranial mass, midline shift, hydrocephalus, or acute hemorrhage. 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 Ventricular system is unchanged from previous study. No acute process noted sinuses and mastoids. No acute bony abnormality.     Impression: No acute intracranial process Authenticated and Electronically Signed by Trever Dumont MD on 06/13/2024 12:34:22 AM         PROCEDURES    Procedures    Interpretations    O2 Sat: The patients oxygen saturation was 98% on Room Air.  This was independently interpreted by me as Normal    Radiology: I ordered and independently reviewed the above noted radiographic studies.  I viewed images of Xray of the left shoulder  which showed  no fracture or dislocation  per my independent interpretation. See radiologist's dictation for official interpretation.       MEDICATIONS GIVEN IN ER    Medications   acetaminophen (TYLENOL) tablet 1,000 mg (1,000 mg Oral Given 6/12/24 2336)         MEDICAL DECISION MAKING, PROGRESS, and CONSULTS    All labs, if obtained, have been independently reviewed by me.  All radiology studies, if obtained, have been reviewed by me and the radiologist dictating the report.  All EKG's, if obtained, have been independently viewed and interpreted by me      Discussion below represents my analysis of pertinent findings related to patient's condition, differential diagnosis, treatment plan and final disposition.      Differential diagnosis:    Cervical fracture, cervical sprain/strain, skull fracture, intracranial hemorrhage, shoulder fracture, shoulder dislocation among others    Additional Sources:  None      Orders placed during this visit:  Orders Placed This Encounter   Procedures    CT Head Without Contrast     CT Cervical Spine Without Contrast    XR Shoulder 2+ View Left         Additional orders considered but not ordered:  None    ED Course:    Consultants:  None    ED Course as of 06/13/24 0109   Thu Jun 13, 2024   0107 ET scan of the head and cervical spine unremarkable per radiologist.  Plain film of the left shoulder reviewed by me reveals no acute fracture or dislocation.  Patient is at her mental baseline per caregivers. [TM]      ED Course User Index  [TM] Jose M Minor PA-C           After my consideration of clinical presentation and any laboratory/radiology studies obtained, I discussed the findings with the patient/patient representative who is in agreement with the treatment plan and the final disposition. Risks and benefits of discharge were discussed.     AS OF 01:09 EDT VITALS:    BP - 93/63  HR - 77  TEMP - 98.1 °F (36.7 °C) (Oral)  O2 SATS - 98%    I reviewed the patients prescription monitoring report if available prior to discharge    DIAGNOSIS  Final diagnoses:   Fall, initial encounter   Injury of neck, initial encounter   Injury of left shoulder, initial encounter         DISPOSITION  ED Disposition       ED Disposition   Discharge    Condition   Stable    Comment   --                   Please note that portions of this document were completed with voice recognition software.        Jose M Minor PA-C  06/13/24 0109

## 2024-08-10 DIAGNOSIS — F91.9 BEHAVIOR DISTURBANCE: ICD-10-CM

## 2024-08-12 RX ORDER — HYDROXYZINE HYDROCHLORIDE 25 MG/1
25 TABLET, FILM COATED ORAL 3 TIMES DAILY
Qty: 90 TABLET | Refills: 2 | OUTPATIENT
Start: 2024-08-12

## 2024-08-15 ENCOUNTER — OFFICE VISIT (OUTPATIENT)
Dept: PSYCHIATRY | Facility: CLINIC | Age: 29
End: 2024-08-15
Payer: MEDICARE

## 2024-08-15 VITALS
HEIGHT: 56 IN | WEIGHT: 96 LBS | HEART RATE: 101 BPM | DIASTOLIC BLOOD PRESSURE: 82 MMHG | BODY MASS INDEX: 21.59 KG/M2 | OXYGEN SATURATION: 96 % | SYSTOLIC BLOOD PRESSURE: 111 MMHG

## 2024-08-15 DIAGNOSIS — F91.9 BEHAVIOR DISTURBANCE: Primary | ICD-10-CM

## 2024-08-15 DIAGNOSIS — Z79.899 LONG-TERM USE OF HIGH-RISK MEDICATION: ICD-10-CM

## 2024-08-15 DIAGNOSIS — F79 INTELLECTUAL DISABILITY: ICD-10-CM

## 2024-08-15 DIAGNOSIS — G80.9 CEREBRAL PALSY, UNSPECIFIED TYPE: ICD-10-CM

## 2024-08-15 DIAGNOSIS — G40.909 SEIZURE DISORDER: ICD-10-CM

## 2024-08-15 PROCEDURE — 1160F RVW MEDS BY RX/DR IN RCRD: CPT | Performed by: NURSE PRACTITIONER

## 2024-08-15 PROCEDURE — 99214 OFFICE O/P EST MOD 30 MIN: CPT | Performed by: NURSE PRACTITIONER

## 2024-08-15 PROCEDURE — 1159F MED LIST DOCD IN RCRD: CPT | Performed by: NURSE PRACTITIONER

## 2024-08-15 RX ORDER — HYDROXYZINE HYDROCHLORIDE 25 MG/1
25 TABLET, FILM COATED ORAL 3 TIMES DAILY
Qty: 90 TABLET | Refills: 3 | Status: SHIPPED | OUTPATIENT
Start: 2024-08-15

## 2024-08-15 RX ORDER — QUETIAPINE FUMARATE 100 MG/1
100 TABLET, FILM COATED ORAL NIGHTLY
Qty: 30 TABLET | Refills: 3 | Status: SHIPPED | OUTPATIENT
Start: 2024-08-15

## 2024-08-15 RX ORDER — CITALOPRAM 20 MG/1
20 TABLET ORAL DAILY
Qty: 31 TABLET | Refills: 3 | Status: SHIPPED | OUTPATIENT
Start: 2024-08-15

## 2024-08-15 RX ORDER — ARIPIPRAZOLE 10 MG/1
10 TABLET ORAL DAILY
Qty: 30 TABLET | Refills: 3 | Status: SHIPPED | OUTPATIENT
Start: 2024-08-15

## 2024-08-15 NOTE — PROGRESS NOTES
Subjective   Ave Correia is a 29 y.o. female presents with Jacque.  Pt continues to live at the  [facility] (supportive living program).  Lives with 2 other individuals and supported by staff.      Chief Complaint:  Recheck on behaviors and sleep    History of Present Illness: Presents with Jacque staff member.  She states pt's behaviors are at baseline and some have improved slightly.  Says she is no longer claiming things against staff.  She continues to have days she refuses meds.  Says this has occurred 5-6 since last visit.  When this occurs it is usually in the evening and pt will refuse dinner as she just wants to go to bed.  Body mass index is 21.54 kg/m².  Jacque says pt is a very picky eater and will refuse food at times.  Still urinates on her self when she gets mad on purpose, Jacque says this happens about 2-3 times per month.  She has had pseudo seizures.  Getting at least 8 hours sleep still goes to bed really early like 5-6 PM. And has always done this          The following portions of the patient's history were reviewed and updated as appropriate: allergies, current medications, past family history, past medical history, past social history, past surgical history and problem list.    Review of Systems   Constitutional: Negative for activity change, appetite change and fatigue.   HENT: Negative.    Eyes: Negative for visual disturbance.   Respiratory: Negative.    Cardiovascular: Negative.    Gastrointestinal: Negative for nausea.   Endocrine: Negative.    Genitourinary: Negative.    Musculoskeletal: Positive for gait problem. Negative for arthralgias.   Skin: Negative.    Allergic/Immunologic: Negative.    Neurological: Positive for seizures and speech difficulty. Negative for dizziness and headaches.   Hematological: Negative.    Psychiatric/Behavioral: Positive for behavioral problems  Negative for agitation, confusion, decreased concentration, dysphoric mood, hallucinations, self-injury and suicidal  ideas. The patient is not nervous/anxious and is not hyperactive.      Reviewed copied data and there are no changes    Objective   Physical Exam  Vitals reviewed.   Constitutional:       Comments: She wears a helmet on her head for the drop seizures.   Musculoskeletal:      Cervical back: Normal range of motion.   Neurological:      Mental Status: She is alert.      Coordination: Coordination abnormal.      Gait: Gait abnormal.   Psychiatric:         Attention and Perception: She is inattentive.         Mood and Affect: Affect is blunt.        Behavior: Behavior is cooperative.         Cognition and Memory: Cognition is impaired.      Comments: Sits leaning forward but will sit up on command.  Today would not engage in conversation with me but did smile a couple of times.          There were no vitals taken for this visit.    Medication List:   Current Outpatient Medications   Medication Sig Dispense Refill    ARIPiprazole (ABILIFY) 10 MG tablet Take 1 tablet by mouth Daily. 30 tablet 2    bisacodyl (DULCOLAX) 5 MG EC tablet Take 5 mg by mouth Daily As Needed for Constipation.      citalopram (CeleXA) 20 MG tablet Take 1 tablet by mouth Daily. 31 tablet 2    cloBAZam (ONFI) 10 MG tablet Take 2 tablets by mouth.      cloBAZam (ONFI) 20 MG tablet 35 mg.      FeroSul 325 (65 Fe) MG tablet Take 1 tablet by mouth Daily.      furosemide (LASIX) 20 MG tablet Take 1 tablet by mouth Daily.      hydrOXYzine (ATARAX) 25 MG tablet Take 1 tablet by mouth 3 (Three) Times a Day. 90 tablet 2    Levonorgest-Eth Estrad 91-Day (Jaimiess) 0.15-0.03 &0.01 MG Take 1 tablet by mouth Daily.      levothyroxine (SYNTHROID, LEVOTHROID) 75 MCG tablet Take 1 tablet by mouth Daily.      multivitamin with minerals tablet tablet Take 1 tablet by mouth Daily.      polyethylene glycol (MIRALAX) 17 GM/SCOOP powder Take 17 g by mouth Daily.      potassium chloride 10 MEQ CR tablet Take 1 tablet by mouth Daily.      QUEtiapine (SEROquel) 100 MG tablet  Take 1 tablet by mouth Every Night. 30 tablet 2     No current facility-administered medications for this visit.     Reviewed copied data and there are no changes    Mental Status Exam:   Hygiene:   fair  Cooperation:  Cooperative  Eye Contact:  Poor  Psychomotor Behavior:  Slow  Affect:  Blunted  Hopelessness: Denies  Speech:  Minimal  Thought Process:  Unable to demonstrate  Thought Content:  Unable to demonstrate  Suicidal:  None  Homicidal:  None  Hallucinations:  None  Delusion:  None  Memory:  Unable to evaluate  Orientation:  Unable to evaluate  Reliability:   unable to evaluate  Insight:  Poor  Judgement:  Poor  Impulse Control:  Poor  Physical/Medical Issues:  Yes cerbral palsy., hypothyroidism, seizures    Assessment & Plan   Problems Addressed this Visit    None  Visit Diagnoses       Behavior disturbance    -  Primary    Intellectual disability        Cerebral palsy, unspecified type        Seizure disorder        Long-term use of high-risk medication              Diagnoses         Codes Comments    Behavior disturbance    -  Primary ICD-10-CM: F91.9  ICD-9-CM: 312.9     Intellectual disability     ICD-10-CM: F79  ICD-9-CM: 319     Cerebral palsy, unspecified type     ICD-10-CM: G80.9  ICD-9-CM: 343.9     Seizure disorder     ICD-10-CM: G40.909  ICD-9-CM: 345.90     Long-term use of high-risk medication     ICD-10-CM: Z79.899  ICD-9-CM: V58.69             Functionality: pt having significant impairment in important areas of daily functioning.  Prognosis: Guarded dependent on medication/follow up and treatment plan compliance.    Pt does show slight weight loss.  I do not believe the 112 lb weight documented on visit to ED on 6/12 as that would have been a 12 lb weight increase in 6 days.  Going to continue the present meds at present dosing.  She seems to be at her baseline behavior level.  Continue the atarax for anxiety and behaviors,   continue the abilify for negative behaviors.  Continue the  seroquel for sleep she will continue the celexa for the obsessive behaviors  had lab work last week from PCP.   She is on depakote per neurology for seizures.  They manage her dapakote level.           Staff member agreeable to call the Clinic with worsening symptoms.   Staff member   is  aware to call 911 or go to the nearest ER should begin having uncontrollable behaviors.   RTC 4 months. Sooner if needed.              This document has been electronically signed by KATE Zhang on   August 15, 2024 11:40 EDT.

## 2024-09-24 ENCOUNTER — LAB (OUTPATIENT)
Dept: LAB | Facility: HOSPITAL | Age: 29
End: 2024-09-24
Payer: MEDICARE

## 2024-09-24 ENCOUNTER — TRANSCRIBE ORDERS (OUTPATIENT)
Dept: LAB | Facility: HOSPITAL | Age: 29
End: 2024-09-24
Payer: MEDICARE

## 2024-09-24 DIAGNOSIS — G40.919 EPILEPSY WITH ALTERED CONSCIOUSNESS WITH INTRACTABLE EPILEPSY: Primary | ICD-10-CM

## 2024-09-24 DIAGNOSIS — G40.919 EPILEPSY WITH ALTERED CONSCIOUSNESS WITH INTRACTABLE EPILEPSY: ICD-10-CM

## 2024-09-24 LAB — VALPROATE SERPL-MCNC: 80 MCG/ML (ref 50–125)

## 2024-09-24 PROCEDURE — 80164 ASSAY DIPROPYLACETIC ACD TOT: CPT

## 2024-09-24 PROCEDURE — 36415 COLL VENOUS BLD VENIPUNCTURE: CPT

## 2024-12-17 ENCOUNTER — OFFICE VISIT (OUTPATIENT)
Dept: PSYCHIATRY | Facility: CLINIC | Age: 29
End: 2024-12-17
Payer: MEDICARE

## 2024-12-17 ENCOUNTER — HOSPITAL ENCOUNTER (OUTPATIENT)
Dept: RESPIRATORY THERAPY | Facility: HOSPITAL | Age: 29
Discharge: HOME | End: 2024-12-17
Admitting: NURSE PRACTITIONER
Payer: MEDICARE

## 2024-12-17 VITALS
BODY MASS INDEX: 22.41 KG/M2 | SYSTOLIC BLOOD PRESSURE: 117 MMHG | DIASTOLIC BLOOD PRESSURE: 80 MMHG | OXYGEN SATURATION: 98 % | HEIGHT: 56 IN | HEART RATE: 98 BPM | WEIGHT: 99.6 LBS

## 2024-12-17 DIAGNOSIS — Z79.899 LONG-TERM USE OF HIGH-RISK MEDICATION: ICD-10-CM

## 2024-12-17 DIAGNOSIS — G80.9 CEREBRAL PALSY, UNSPECIFIED TYPE: ICD-10-CM

## 2024-12-17 DIAGNOSIS — G40.909 SEIZURE DISORDER: ICD-10-CM

## 2024-12-17 DIAGNOSIS — F79 INTELLECTUAL DISABILITY: ICD-10-CM

## 2024-12-17 DIAGNOSIS — F91.9 BEHAVIOR DISTURBANCE: Primary | ICD-10-CM

## 2024-12-17 PROCEDURE — 93005 ELECTROCARDIOGRAM TRACING: CPT | Performed by: NURSE PRACTITIONER

## 2024-12-17 PROCEDURE — 1159F MED LIST DOCD IN RCRD: CPT | Performed by: NURSE PRACTITIONER

## 2024-12-17 PROCEDURE — 1160F RVW MEDS BY RX/DR IN RCRD: CPT | Performed by: NURSE PRACTITIONER

## 2024-12-17 PROCEDURE — 99214 OFFICE O/P EST MOD 30 MIN: CPT | Performed by: NURSE PRACTITIONER

## 2024-12-17 RX ORDER — QUETIAPINE FUMARATE 100 MG/1
100 TABLET, FILM COATED ORAL NIGHTLY
Qty: 30 TABLET | Refills: 3 | Status: SHIPPED | OUTPATIENT
Start: 2024-12-17

## 2024-12-17 RX ORDER — CITALOPRAM HYDROBROMIDE 20 MG/1
20 TABLET ORAL DAILY
Qty: 31 TABLET | Refills: 3 | Status: SHIPPED | OUTPATIENT
Start: 2024-12-17

## 2024-12-17 RX ORDER — ARIPIPRAZOLE 10 MG/1
10 TABLET ORAL DAILY
Qty: 30 TABLET | Refills: 3 | Status: SHIPPED | OUTPATIENT
Start: 2024-12-17

## 2024-12-17 RX ORDER — HYDROXYZINE HYDROCHLORIDE 25 MG/1
25 TABLET, FILM COATED ORAL 3 TIMES DAILY
Qty: 90 TABLET | Refills: 3 | Status: SHIPPED | OUTPATIENT
Start: 2024-12-17
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
## 2024-12-18 LAB
QT INTERVAL: 376 MS
QTC INTERVAL: 441 MS

## 2025-01-27 ENCOUNTER — OFFICE VISIT (OUTPATIENT)
Dept: PSYCHIATRY | Facility: CLINIC | Age: 30
End: 2025-01-27
Payer: MEDICARE

## 2025-01-27 VITALS
BODY MASS INDEX: 22.76 KG/M2 | DIASTOLIC BLOOD PRESSURE: 74 MMHG | SYSTOLIC BLOOD PRESSURE: 107 MMHG | HEART RATE: 97 BPM | WEIGHT: 101.2 LBS | OXYGEN SATURATION: 92 % | HEIGHT: 56 IN

## 2025-01-27 DIAGNOSIS — G80.9 CEREBRAL PALSY, UNSPECIFIED TYPE: ICD-10-CM

## 2025-01-27 DIAGNOSIS — F44.9: ICD-10-CM

## 2025-01-27 DIAGNOSIS — G40.909 SEIZURE DISORDER: ICD-10-CM

## 2025-01-27 DIAGNOSIS — Z79.899 LONG-TERM USE OF HIGH-RISK MEDICATION: ICD-10-CM

## 2025-01-27 DIAGNOSIS — F79 INTELLECTUAL DISABILITY: ICD-10-CM

## 2025-01-27 DIAGNOSIS — F91.9 BEHAVIOR DISTURBANCE: Primary | ICD-10-CM

## 2025-01-27 PROCEDURE — 99214 OFFICE O/P EST MOD 30 MIN: CPT | Performed by: NURSE PRACTITIONER

## 2025-01-27 PROCEDURE — 1159F MED LIST DOCD IN RCRD: CPT | Performed by: NURSE PRACTITIONER

## 2025-01-27 PROCEDURE — 1160F RVW MEDS BY RX/DR IN RCRD: CPT | Performed by: NURSE PRACTITIONER

## 2025-01-27 RX ORDER — HYDROXYZINE HYDROCHLORIDE 50 MG/1
50 TABLET, FILM COATED ORAL 3 TIMES DAILY
Qty: 90 TABLET | Refills: 3 | Status: SHIPPED | OUTPATIENT
Start: 2025-01-27

## 2025-01-27 RX ORDER — CITALOPRAM HYDROBROMIDE 20 MG/1
20 TABLET ORAL DAILY
Qty: 31 TABLET | Refills: 3 | Status: SHIPPED | OUTPATIENT
Start: 2025-01-27

## 2025-01-27 RX ORDER — ARIPIPRAZOLE 10 MG/1
10 TABLET ORAL DAILY
Qty: 30 TABLET | Refills: 3 | Status: SHIPPED | OUTPATIENT
Start: 2025-01-27

## 2025-01-27 RX ORDER — LAMOTRIGINE 25 MG/1
TABLET ORAL
COMMUNITY
Start: 2025-01-23

## 2025-01-27 RX ORDER — QUETIAPINE FUMARATE 100 MG/1
100 TABLET, FILM COATED ORAL NIGHTLY
Qty: 30 TABLET | Refills: 3 | Status: SHIPPED | OUTPATIENT
Start: 2025-01-27

## 2025-01-27 RX ORDER — DIVALPROEX SODIUM 500 MG/1
1000 TABLET, DELAYED RELEASE ORAL 2 TIMES DAILY
COMMUNITY
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
## 2025-03-14 ENCOUNTER — APPOINTMENT (OUTPATIENT)
Dept: GENERAL RADIOLOGY | Facility: HOSPITAL | Age: 30
End: 2025-03-14
Payer: MEDICARE

## 2025-03-14 ENCOUNTER — APPOINTMENT (OUTPATIENT)
Dept: CT IMAGING | Facility: HOSPITAL | Age: 30
End: 2025-03-14
Payer: MEDICARE

## 2025-03-14 ENCOUNTER — HOSPITAL ENCOUNTER (INPATIENT)
Facility: HOSPITAL | Age: 30
LOS: 3 days | Discharge: HOME | End: 2025-03-19
Attending: EMERGENCY MEDICINE | Admitting: INTERNAL MEDICINE
Payer: MEDICARE

## 2025-03-14 DIAGNOSIS — E86.1 HYPOTENSION DUE TO HYPOVOLEMIA: ICD-10-CM

## 2025-03-14 DIAGNOSIS — R40.2422 GLASGOW COMA SCALE TOTAL SCORE 9-12, AT ARRIVAL TO EMERGENCY DEPARTMENT: Primary | ICD-10-CM

## 2025-03-14 PROBLEM — R41.82 ALTERED MENTAL STATUS: Status: ACTIVE | Noted: 2025-03-14

## 2025-03-14 LAB
ALBUMIN SERPL-MCNC: 3.9 G/DL (ref 3.5–5.2)
ALBUMIN/GLOB SERPL: 1.4 G/DL
ALP SERPL-CCNC: 51 U/L (ref 39–117)
ALT SERPL W P-5'-P-CCNC: 16 U/L (ref 1–33)
AMMONIA BLD-SCNC: 61 UMOL/L (ref 11–51)
ANION GAP SERPL CALCULATED.3IONS-SCNC: 9.2 MMOL/L (ref 5–15)
AST SERPL-CCNC: 28 U/L (ref 1–32)
ATMOSPHERIC PRESS: 727 MMHG
B PARAPERT DNA SPEC QL NAA+PROBE: NOT DETECTED
B PERT DNA SPEC QL NAA+PROBE: NOT DETECTED
BACTERIA UR QL AUTO: ABNORMAL /HPF
BASE EXCESS BLDV CALC-SCNC: 0.7 MMOL/L (ref 0–2)
BASOPHILS # BLD AUTO: 0 10*3/MM3 (ref 0–0.2)
BASOPHILS NFR BLD AUTO: 0 % (ref 0–1.5)
BDY SITE: ABNORMAL
BILIRUB SERPL-MCNC: <0.2 MG/DL (ref 0–1.2)
BILIRUB UR QL STRIP: NEGATIVE
BUN SERPL-MCNC: 15 MG/DL (ref 6–20)
BUN/CREAT SERPL: 22.1 (ref 7–25)
C PNEUM DNA NPH QL NAA+NON-PROBE: NOT DETECTED
CALCIUM SPEC-SCNC: 8.9 MG/DL (ref 8.6–10.5)
CHLORIDE SERPL-SCNC: 98 MMOL/L (ref 98–107)
CLARITY UR: CLEAR
CO2 SERPL-SCNC: 26.8 MMOL/L (ref 22–29)
COHGB MFR BLD: 1.1 % (ref 0–5)
COLOR UR: YELLOW
CREAT SERPL-MCNC: 0.68 MG/DL (ref 0.57–1)
D-LACTATE SERPL-SCNC: 1.4 MMOL/L (ref 0.5–2)
DEPRECATED RDW RBC AUTO: 47.3 FL (ref 37–54)
EGFRCR SERPLBLD CKD-EPI 2021: 121.1 ML/MIN/1.73
EOSINOPHIL # BLD AUTO: 0.02 10*3/MM3 (ref 0–0.4)
EOSINOPHIL NFR BLD AUTO: 0.4 % (ref 0.3–6.2)
ERYTHROCYTE [DISTWIDTH] IN BLOOD BY AUTOMATED COUNT: 13.2 % (ref 12.3–15.4)
FLUAV SUBTYP SPEC NAA+PROBE: NOT DETECTED
FLUBV RNA ISLT QL NAA+PROBE: NOT DETECTED
FOLATE SERPL-MCNC: 18.2 NG/ML (ref 4.78–24.2)
GEN 5 1HR TROPONIN T REFLEX: 17 NG/L
GLOBULIN UR ELPH-MCNC: 2.7 GM/DL
GLUCOSE BLDC GLUCOMTR-MCNC: 118 MG/DL (ref 70–130)
GLUCOSE SERPL-MCNC: 112 MG/DL (ref 65–99)
GLUCOSE UR STRIP-MCNC: NEGATIVE MG/DL
HADV DNA SPEC NAA+PROBE: NOT DETECTED
HCG SERPL QL: NEGATIVE
HCO3 BLDV-SCNC: 27.8 MMOL/L (ref 22–28)
HCOV 229E RNA SPEC QL NAA+PROBE: NOT DETECTED
HCOV HKU1 RNA SPEC QL NAA+PROBE: NOT DETECTED
HCOV NL63 RNA SPEC QL NAA+PROBE: NOT DETECTED
HCOV OC43 RNA SPEC QL NAA+PROBE: NOT DETECTED
HCT VFR BLD AUTO: 34 % (ref 34–46.6)
HGB BLD-MCNC: 11.7 G/DL (ref 12–15.9)
HGB UR QL STRIP.AUTO: ABNORMAL
HMPV RNA NPH QL NAA+NON-PROBE: NOT DETECTED
HOLD SPECIMEN: NORMAL
HPIV1 RNA ISLT QL NAA+PROBE: NOT DETECTED
HPIV2 RNA SPEC QL NAA+PROBE: NOT DETECTED
HPIV3 RNA NPH QL NAA+PROBE: NOT DETECTED
HPIV4 P GENE NPH QL NAA+PROBE: NOT DETECTED
HYALINE CASTS UR QL AUTO: ABNORMAL /LPF
IMM GRANULOCYTES # BLD AUTO: 0.01 10*3/MM3 (ref 0–0.05)
IMM GRANULOCYTES NFR BLD AUTO: 0.2 % (ref 0–0.5)
KETONES UR QL STRIP: ABNORMAL
LEUKOCYTE ESTERASE UR QL STRIP.AUTO: NEGATIVE
LYMPHOCYTES # BLD AUTO: 2.36 10*3/MM3 (ref 0.7–3.1)
LYMPHOCYTES NFR BLD AUTO: 49.3 % (ref 19.6–45.3)
Lab: ABNORMAL
M PNEUMO IGG SER IA-ACNC: NOT DETECTED
MCH RBC QN AUTO: 33.4 PG (ref 26.6–33)
MCHC RBC AUTO-ENTMCNC: 34.4 G/DL (ref 31.5–35.7)
MCV RBC AUTO: 97.1 FL (ref 79–97)
METHGB BLD QL: 0.5 % (ref 0–3)
MODALITY: ABNORMAL
MONOCYTES # BLD AUTO: 0.25 10*3/MM3 (ref 0.1–0.9)
MONOCYTES NFR BLD AUTO: 5.2 % (ref 5–12)
NEUTROPHILS NFR BLD AUTO: 2.15 10*3/MM3 (ref 1.7–7)
NEUTROPHILS NFR BLD AUTO: 44.9 % (ref 42.7–76)
NITRITE UR QL STRIP: NEGATIVE
NRBC BLD AUTO-RTO: 0 /100 WBC (ref 0–0.2)
OXYHGB MFR BLDV: 53.2 % (ref 40–70)
PCO2 BLDV: 56.2 MM HG (ref 40–50)
PH BLDV: 7.3 PH UNITS (ref 7.32–7.42)
PH UR STRIP.AUTO: 6.5 [PH] (ref 5–8)
PLATELET # BLD AUTO: 81 10*3/MM3 (ref 140–450)
PMV BLD AUTO: 11.1 FL (ref 6–12)
PO2 BLDV: 33.7 MM HG (ref 30–50)
POTASSIUM SERPL-SCNC: 4 MMOL/L (ref 3.5–5.2)
PROT SERPL-MCNC: 6.6 G/DL (ref 6–8.5)
PROT UR QL STRIP: NEGATIVE
RBC # BLD AUTO: 3.5 10*6/MM3 (ref 3.77–5.28)
RBC # UR STRIP: ABNORMAL /HPF
REF LAB TEST METHOD: ABNORMAL
RHINOVIRUS RNA SPEC NAA+PROBE: NOT DETECTED
RSV RNA NPH QL NAA+NON-PROBE: NOT DETECTED
SAO2 % BLDCOV: 54.1 % (ref 45–75)
SARS-COV-2 RNA RESP QL NAA+PROBE: NOT DETECTED
SODIUM SERPL-SCNC: 134 MMOL/L (ref 136–145)
SP GR UR STRIP: 1.02 (ref 1–1.03)
SQUAMOUS #/AREA URNS HPF: ABNORMAL /HPF
TROPONIN T % DELTA: -15
TROPONIN T NUMERIC DELTA: -3 NG/L
TROPONIN T SERPL HS-MCNC: 20 NG/L
TSH SERPL DL<=0.05 MIU/L-ACNC: 2.93 UIU/ML (ref 0.27–4.2)
TSH SERPL DL<=0.05 MIU/L-ACNC: 3.27 UIU/ML (ref 0.27–4.2)
UROBILINOGEN UR QL STRIP: ABNORMAL
VALPROATE SERPL-MCNC: >150 MCG/ML (ref 50–125)
VENTILATOR MODE: ABNORMAL
VIT B12 BLD-MCNC: 1197 PG/ML (ref 211–946)
WBC # UR STRIP: ABNORMAL /HPF
WBC NRBC COR # BLD AUTO: 4.79 10*3/MM3 (ref 3.4–10.8)
WHOLE BLOOD HOLD COAG: NORMAL
WHOLE BLOOD HOLD SPECIMEN: NORMAL

## 2025-03-14 PROCEDURE — 80164 ASSAY DIPROPYLACETIC ACD TOT: CPT | Performed by: EMERGENCY MEDICINE

## 2025-03-14 PROCEDURE — 70450 CT HEAD/BRAIN W/O DYE: CPT

## 2025-03-14 PROCEDURE — 25810000003 SODIUM CHLORIDE 0.9 % SOLUTION: Performed by: STUDENT IN AN ORGANIZED HEALTH CARE EDUCATION/TRAINING PROGRAM

## 2025-03-14 PROCEDURE — 25010000002 ENOXAPARIN PER 10 MG: Performed by: STUDENT IN AN ORGANIZED HEALTH CARE EDUCATION/TRAINING PROGRAM

## 2025-03-14 PROCEDURE — 99222 1ST HOSP IP/OBS MODERATE 55: CPT | Performed by: STUDENT IN AN ORGANIZED HEALTH CARE EDUCATION/TRAINING PROGRAM

## 2025-03-14 PROCEDURE — G0378 HOSPITAL OBSERVATION PER HR: HCPCS

## 2025-03-14 PROCEDURE — 25810000003 SODIUM CHLORIDE 0.9 % SOLUTION: Performed by: FAMILY MEDICINE

## 2025-03-14 PROCEDURE — 99285 EMERGENCY DEPT VISIT HI MDM: CPT | Performed by: EMERGENCY MEDICINE

## 2025-03-14 PROCEDURE — 80053 COMPREHEN METABOLIC PANEL: CPT | Performed by: EMERGENCY MEDICINE

## 2025-03-14 PROCEDURE — 84145 PROCALCITONIN (PCT): CPT | Performed by: FAMILY MEDICINE

## 2025-03-14 PROCEDURE — 85025 COMPLETE CBC W/AUTO DIFF WBC: CPT | Performed by: EMERGENCY MEDICINE

## 2025-03-14 PROCEDURE — 71045 X-RAY EXAM CHEST 1 VIEW: CPT

## 2025-03-14 PROCEDURE — 82820 HEMOGLOBIN-OXYGEN AFFINITY: CPT

## 2025-03-14 PROCEDURE — 93005 ELECTROCARDIOGRAM TRACING: CPT | Performed by: EMERGENCY MEDICINE

## 2025-03-14 PROCEDURE — 84484 ASSAY OF TROPONIN QUANT: CPT | Performed by: EMERGENCY MEDICINE

## 2025-03-14 PROCEDURE — 83605 ASSAY OF LACTIC ACID: CPT | Performed by: EMERGENCY MEDICINE

## 2025-03-14 PROCEDURE — 0202U NFCT DS 22 TRGT SARS-COV-2: CPT | Performed by: FAMILY MEDICINE

## 2025-03-14 PROCEDURE — 25810000003 LACTATED RINGERS SOLUTION: Performed by: EMERGENCY MEDICINE

## 2025-03-14 PROCEDURE — 82746 ASSAY OF FOLIC ACID SERUM: CPT | Performed by: STUDENT IN AN ORGANIZED HEALTH CARE EDUCATION/TRAINING PROGRAM

## 2025-03-14 PROCEDURE — 84443 ASSAY THYROID STIM HORMONE: CPT | Performed by: EMERGENCY MEDICINE

## 2025-03-14 PROCEDURE — 82607 VITAMIN B-12: CPT | Performed by: STUDENT IN AN ORGANIZED HEALTH CARE EDUCATION/TRAINING PROGRAM

## 2025-03-14 PROCEDURE — 82948 REAGENT STRIP/BLOOD GLUCOSE: CPT

## 2025-03-14 PROCEDURE — 81001 URINALYSIS AUTO W/SCOPE: CPT | Performed by: STUDENT IN AN ORGANIZED HEALTH CARE EDUCATION/TRAINING PROGRAM

## 2025-03-14 PROCEDURE — 82805 BLOOD GASES W/O2 SATURATION: CPT

## 2025-03-14 PROCEDURE — 84703 CHORIONIC GONADOTROPIN ASSAY: CPT | Performed by: EMERGENCY MEDICINE

## 2025-03-14 PROCEDURE — 25810000003 SODIUM CHLORIDE 0.9 % SOLUTION: Performed by: EMERGENCY MEDICINE

## 2025-03-14 PROCEDURE — 84443 ASSAY THYROID STIM HORMONE: CPT | Performed by: STUDENT IN AN ORGANIZED HEALTH CARE EDUCATION/TRAINING PROGRAM

## 2025-03-14 PROCEDURE — 82140 ASSAY OF AMMONIA: CPT | Performed by: EMERGENCY MEDICINE

## 2025-03-14 RX ORDER — SODIUM CHLORIDE 9 MG/ML
75 INJECTION, SOLUTION INTRAVENOUS CONTINUOUS
Status: DISCONTINUED | OUTPATIENT
Start: 2025-03-14 | End: 2025-03-19 | Stop reason: HOSPADM

## 2025-03-14 RX ORDER — CLOBAZAM 10 MG/1
20 TABLET ORAL 2 TIMES DAILY
Status: DISCONTINUED | OUTPATIENT
Start: 2025-03-14 | End: 2025-03-17

## 2025-03-14 RX ORDER — NITROGLYCERIN 0.4 MG/1
0.4 TABLET SUBLINGUAL
Status: DISCONTINUED | OUTPATIENT
Start: 2025-03-14 | End: 2025-03-19 | Stop reason: HOSPADM

## 2025-03-14 RX ORDER — PANTOPRAZOLE SODIUM 40 MG/1
40 TABLET, DELAYED RELEASE ORAL DAILY
COMMUNITY

## 2025-03-14 RX ORDER — LEVETIRACETAM 500 MG/1
1000 TABLET ORAL 2 TIMES DAILY
COMMUNITY

## 2025-03-14 RX ORDER — PANTOPRAZOLE SODIUM 40 MG/1
40 TABLET, DELAYED RELEASE ORAL DAILY
Status: DISCONTINUED | OUTPATIENT
Start: 2025-03-15 | End: 2025-03-19 | Stop reason: HOSPADM

## 2025-03-14 RX ORDER — NORETHINDRONE ACETATE AND ETHINYL ESTRADIOL .02; 1 MG/1; MG/1
1 TABLET ORAL DAILY
COMMUNITY

## 2025-03-14 RX ORDER — ENOXAPARIN SODIUM 100 MG/ML
30 INJECTION SUBCUTANEOUS NIGHTLY
Status: DISCONTINUED | OUTPATIENT
Start: 2025-03-14 | End: 2025-03-15

## 2025-03-14 RX ORDER — SODIUM CHLORIDE 0.9 % (FLUSH) 0.9 %
10 SYRINGE (ML) INJECTION AS NEEDED
Status: DISCONTINUED | OUTPATIENT
Start: 2025-03-14 | End: 2025-03-19 | Stop reason: HOSPADM

## 2025-03-14 RX ORDER — LEVETIRACETAM 500 MG/1
1000 TABLET ORAL 2 TIMES DAILY
Status: DISCONTINUED | OUTPATIENT
Start: 2025-03-14 | End: 2025-03-15

## 2025-03-14 RX ORDER — SODIUM CHLORIDE 9 MG/ML
40 INJECTION, SOLUTION INTRAVENOUS AS NEEDED
Status: DISCONTINUED | OUTPATIENT
Start: 2025-03-14 | End: 2025-03-19 | Stop reason: HOSPADM

## 2025-03-14 RX ORDER — ARIPIPRAZOLE 5 MG/1
10 TABLET ORAL DAILY
Status: DISCONTINUED | OUTPATIENT
Start: 2025-03-15 | End: 2025-03-17

## 2025-03-14 RX ORDER — SODIUM CHLORIDE 0.9 % (FLUSH) 0.9 %
10 SYRINGE (ML) INJECTION EVERY 12 HOURS SCHEDULED
Status: DISCONTINUED | OUTPATIENT
Start: 2025-03-14 | End: 2025-03-19 | Stop reason: HOSPADM

## 2025-03-14 RX ORDER — HYDROXYZINE HYDROCHLORIDE 25 MG/1
50 TABLET, FILM COATED ORAL 3 TIMES DAILY
Status: DISCONTINUED | OUTPATIENT
Start: 2025-03-14 | End: 2025-03-19 | Stop reason: HOSPADM

## 2025-03-14 RX ORDER — LEVOTHYROXINE SODIUM 75 UG/1
75 TABLET ORAL DAILY
Status: DISCONTINUED | OUTPATIENT
Start: 2025-03-14 | End: 2025-03-19 | Stop reason: HOSPADM

## 2025-03-14 RX ADMIN — ENOXAPARIN SODIUM 30 MG: 100 INJECTION SUBCUTANEOUS at 22:51

## 2025-03-14 RX ADMIN — SODIUM CHLORIDE, POTASSIUM CHLORIDE, SODIUM LACTATE AND CALCIUM CHLORIDE 1000 ML: 600; 310; 30; 20 INJECTION, SOLUTION INTRAVENOUS at 14:32

## 2025-03-14 RX ADMIN — SODIUM CHLORIDE 75 ML/HR: 9 INJECTION, SOLUTION INTRAVENOUS at 18:11

## 2025-03-14 RX ADMIN — SODIUM CHLORIDE 500 ML: 9 INJECTION, SOLUTION INTRAVENOUS at 23:37

## 2025-03-14 RX ADMIN — SODIUM CHLORIDE 1000 ML: 9 INJECTION, SOLUTION INTRAVENOUS at 12:59

## 2025-03-14 NOTE — CASE MANAGEMENT/SOCIAL WORK
Discharge Planning Assessment  [facility]     Patient Name: Ave Correia  MRN: [id number]  Today's Date: 3/14/2025    Admit Date: 3/14/2025    Plan: Plan for patient to return to [facility]; no needs at this time   Discharge Needs Assessment       Row Name 03/14/25 1737       Living Environment    People in Home facility resident    Unique Family Situation Resident at [facility]    Current Living Arrangements group home    Duration at Residence Since 11/16/2023    Potentially Unsafe Housing Conditions patient unable to answer    In the past 12 months has the electric, gas, oil, or water company threatened to shut off services in your home? Pt Unable    Primary Care Provided by other (see comments)  staff at [facility]    Provides Primary Care For no one, unable/limited ability to care for self    Family Caregiver if Needed none    Quality of Family Relationships unable to assess    Able to Return to Prior Arrangements yes       Resource/Environmental Concerns    Resource/Environmental Concerns none    Transportation Concerns none       Transportation Needs    In the past 12 months, has lack of transportation kept you from medical appointments or from getting medications? Pt Unable    In the past 12 months, has lack of transportation kept you from meetings, work, or from getting things needed for daily living? Pt Unable       Food Insecurity    Within the past 12 months, you worried that your food would run out before you got the money to buy more. Pt Unable    Within the past 12 months, the food you bought just didn't last and you didn't have money to get more. Pt Unable       Transition Planning    Patient/Family Anticipates Transition to home    Patient/Family Anticipated Services at Transition none    Transportation Anticipated other (see comments)  EMS       Discharge Needs Assessment    Readmission Within the Last 30 Days no previous admission in last 30 days    Current Outpatient/Agency/Support  Group group home    Equipment Currently Used at Home other (see comments)  gait belt    Concerns to be Addressed discharge planning    Do you want help finding or keeping work or a job? Patient unable to answer    Do you want help with school or training? For example, starting or completing job training or getting a high school diploma, GED or equivalent Patient unable to answer    Anticipated Changes Related to Illness none    Equipment Needed After Discharge none    Provided Post Acute Provider List? N/A    Provided Post Acute Provider Quality & Resource List? N/A    Offered/Gave Vendor List no                   Discharge Plan       Row Name 03/14/25 5703       Plan    Plan Plan for patient to return to [facility]; no needs at this time    Patient/Family in Agreement with Plan yes    Provided Post Acute Provider List? N/A    Provided Post Acute Provider Quality & Resource List? N/A    Plan Comments Patient is unable to answer questions; she has an appointed guardian, Mayela Howard, that is able to assist with this assessment.  She is a current patient of [facility] and gets her medications from [facility].  She wears a helmet due to seizure disorder and staff use a gait belt to assist her with ambulation.  Guardian denies the need for DME; opts out of Meds to Bed.  At the time of DC the patient will return to [facility].  Questions and concerns were addressed, SALAZAR delivered to patient's guardian via phone call.  Will provide additional resources and information upon request.    Final Note DC back to [facility]                       Demographic Summary       Row Name 03/14/25 7970       General Information    Admission Type observation    Arrived From emergency department    Required Notices Provided Observation Status Notice    Referral Source admission list    Reason for Consult discharge planning    Preferred Language English       Contact Information    Permission Granted to Share Info  With guardian    Contact Information Comments Patient is unable to answer questions; she has an appointed guardian, Mayela Howard, that is able to assist with this assessment.                   Functional Status       Row Name 03/14/25 1736       Functional Status    Usual Activity Tolerance good    Current Activity Tolerance poor       Physical Activity    On average, how many days per week do you engage in moderate to strenuous exercise (like a brisk walk)? Pt Unable    On average, how many minutes do you engage in exercise at this level? Pt Unable       Assessment of Health Literacy    How often do you have someone help you read hospital materials? Always  Patient is unable to answer questions; she has an appointed guardian, Mayela Howard, that is able to assist with this assessment.    How often do you have problems learning about your medical condition because of difficulty understanding written information? Always    How often do you have a problem understanding what is told to you about your medical condition? Always    How confident are you filling out medical forms by yourself? Not at all    Health Literacy Low       Functional Status, IADL    Medications completely dependent    Meal Preparation completely dependent    Housekeeping completely dependent    Laundry completely dependent    Shopping completely dependent    If for any reason you need help with day-to-day activities such as bathing, preparing meals, shopping, managing finances, etc., do you get the help you need? Patient unable to answer       Mental Status    General Appearance WDL WDL       Mental Status Summary    Recent Changes in Mental Status/Cognitive Functioning unable to assess    Mental Status Comments Patient is unable to answer questions; she has an appointed guardian, Mayela Howard, that is able to assist with this assessment.       Employment/    Employment Status disabled    Current or Previous Occupation not applicable                    Psychosocial    No documentation.                  Abuse/Neglect       Row Name 03/14/25 1737       Personal Safety    Feels Unsafe at Home or Work/School unable to answer (comment required)    Feels Threatened by Someone unable to answer (comment required)    Does Anyone Try to Keep You From Having Contact with Others or Doing Things Outside Your Home? unable to answer (comment required)    Physical Signs of Abuse Present patient unable to answer                   Legal       Row Name 03/14/25 1737       Financial Resource Strain    How hard is it for you to pay for the very basics like food, housing, medical care, and heating? Pt Unable                   Substance Abuse       Row Name 03/14/25 1737       Substance Use    Substance Use Status never used                   Patient Forms       Row Name 03/14/25 1741       Patient Forms    Patient Observation Letter Delivered    Delivered to Support person  Konstantin Agarwal    Method of delivery Telephone  [phone]                      Teressa Sims RN

## 2025-03-14 NOTE — PROGRESS NOTES
Pharmacy Consult - Enoxaparin Dosing  Ave Croreia is a 29 y.o. female who has been consulted to dose Enoxaparin for VTE PPX.     Allergies  Versed [midazolam]    Relevant clinical data and objective history reviewed:   [Ht:  ; Wt: 48.6 kg (107 lb 2.3 oz)]  Body mass index is 24.03 kg/m².  Estimated Creatinine Clearance: 93.7 mL/min (by C-G formula based on SCr of 0.68 mg/dL).  Results from last 7 days   Lab Units 03/14/25  1255   HEMOGLOBIN g/dL 11.7*   HEMATOCRIT % 34.0   PLATELETS 10*3/mm3 81*   CREATININE mg/dL 0.68       Asessment/Plan  Initiate Enoxaparin 30mg SQ every 24 hours  Pharmacy will monitor Ms. Correia's renal function and clinical status and adjust the Enoxaparin dose and/or frequency as needed.    Thanks,   Zakiya Lindo, PharmD  3/14/2025  17:56 EDT

## 2025-03-14 NOTE — H&P
"    [facility]    Patient Identification:  Name:  Ave Correia  Age:  29 y.o.  Sex:  female  :  1995  MRN:  [id number]   Visit Number:  [id number]  Admit Date: 3/14/2025   Room number:    Primary Care Physician:  Reyna Loyd APRN    Date of Admission: 3/14/2025     Subjective     Chief complaint:    Chief Complaint   Patient presents with    Fall     Pt was brought in from the [facility] for a fall.       History of presenting illness:     This is a 28 yo female with a PMH of cerebral palsy, hemiparesis of right dominant side due to non-cerebral vascular etiology, eizures, anxiety and hypothyroidism presenting with altered mental status.  Of note, she is currently staying at a residential program and one of the staff members is at the bedside explaining what has occurred.  It appears that the patient at baseline can have oh full conversation although her speech is a little impaired and sometimes difficult to understand.  She she is also able to ambulate despite having some difficulties due to her left hemiparesis.  It appears that around 3 AM this morning, she woke up screaming \"I hate you\", \"I will kill you\" until about 8 AM.  She went back to her baseline until around 12:30 when she fell to the floor.  The patient then became combative, and refused to get up. Staff did notice that her urine had a foul smell and looked dark.  EMS was called and she was found to be hypotensive and brought to our ED.  At this time, the patient is not following commands, and is groaning.     In ED, blood pressure 66/41, heart rate 75, respiratory rate of 16, temperature 97 O2 saturation 95% on room air.  Labs on arrival showed a sodium 134, potassium 4, BUN 15, creatinine 0.6, WBC 4, hemoglobin 11.7, and platelet count 81.  Patient's valproic acid over 150.  Chest x-ray showed no acute infiltrates.  CT head showed no acute findings.  The patient has been started on IV " fluids and her blood pressure has improved to 101/73.    ---------------------------------------------------------------------------------------------------------------------   Review of Systems Unable to assess, patient is altered  ---------------------------------------------------------------------------------------------------------------------   Past Medical History:   Diagnosis Date    Blind right eye     Cerebral palsy     Hemiparesis of right dominant side due to non-cerebrovascular etiology     Seizures      Past Surgical History:   Procedure Laterality Date    DENTAL EXAMINATION UNDER ANESTHESIA W/ CLEANING AND XRAYS      Fillings placed.    IMPLANTATION VAGAL NERVE STIMULATOR       History reviewed. No pertinent family history.  Social History     Socioeconomic History    Marital status: Single   Tobacco Use    Smoking status: Never    Smokeless tobacco: Never   Vaping Use    Vaping status: Never Used   Substance and Sexual Activity    Alcohol use: Never    Drug use: Never     ---------------------------------------------------------------------------------------------------------------------   Allergies:  Versed [midazolam]  ---------------------------------------------------------------------------------------------------------------------   Medications below are reported home medications pulling from within the system; at this time, these medications have not been reconciled unless otherwise specified and are in the verification process for further verifcation as current home medications.      Prior to Admission Medications       Prescriptions Last Dose Informant Patient Reported? Taking?    ARIPiprazole (ABILIFY) 10 MG tablet   No No    Take 1 tablet by mouth Daily.    bisacodyl (DULCOLAX) 5 MG EC tablet   Yes No    Take 5 mg by mouth Daily As Needed for Constipation.    citalopram (CeleXA) 20 MG tablet   No No    Take 1 tablet by mouth Daily.    cloBAZam (ONFI) 10 MG tablet   Yes No    Take 2 tablets  by mouth.    cloBAZam (ONFI) 20 MG tablet   Yes No    35 mg.    divalproex (DEPAKOTE) 500 MG DR tablet   Yes No    Take 2 tablets by mouth 2 (Two) Times a Day.    FeroSul 325 (65 Fe) MG tablet   Yes No    Take 1 tablet by mouth Daily.    furosemide (LASIX) 20 MG tablet   Yes No    Take 1 tablet by mouth Daily.    hydrOXYzine (ATARAX) 50 MG tablet   No No    Take 1 tablet by mouth 3 (Three) Times a Day.    lamoTRIgine (LaMICtal) 25 MG tablet   Yes No    TAKE ONE TABLET BY MOUTH EVERY OTHER DAY FOR 2 WEEKS (STARTS 1/24/25)    Levonorgest-Eth Estrad 91-Day (Jaimiess) 0.15-0.03 &0.01 MG   Yes No    Take 1 tablet by mouth Daily.    levothyroxine (SYNTHROID, LEVOTHROID) 75 MCG tablet   Yes No    Take 1 tablet by mouth Daily.    multivitamin with minerals tablet tablet   Yes No    Take 1 tablet by mouth Daily.    polyethylene glycol (MIRALAX) 17 GM/SCOOP powder   Yes No    Take 17 g by mouth Daily.    potassium chloride 10 MEQ CR tablet   Yes No    Take 1 tablet by mouth Daily.    QUEtiapine (SEROquel) 100 MG tablet   No No    Take 1 tablet by mouth Every Night.          Objective     Vital Signs:  Temp:  [97 °F (36.1 °C)] 97 °F (36.1 °C)  Heart Rate:  [64-75] 68  Resp:  [16] 16  BP: (66-89)/(41-66) 89/66    Mean Arterial Pressure (Non-Invasive) for the past 24 hrs (Last 3 readings):   Noninvasive MAP (mmHg)   03/14/25 1502 80   03/14/25 1452 64   03/14/25 1442 54     SpO2:  [94 %-100 %] 100 %  on   ;   Device (Oxygen Therapy): room air  Body mass index is 22.7 kg/m².    Wt Readings from Last 3 Encounters:   03/14/25 45.9 kg (101 lb 3.1 oz)   01/27/25 45.9 kg (101 lb 3.2 oz)   12/17/24 45.2 kg (99 lb 9.6 oz)      ----------------------------------------------------------------------------------------------------------------------  PHYSICAL EXAMINATION:  GENERAL: The patient is well developed and nontoxic.  HEENT: Anicteric sclerae, PERRLA, EOMI. Oropharynx clear. Moist mucous membranes. Conjunctivae appear well  perfused.  CHEST: Chest wall is nontender.  HEART: Regular rate and rhythm without murmurs.  LUNGS: Clear to auscultation bilaterally.  ABDOMEN: Soft, positive bowel sounds, nontender, no organomegaly.  RECTAL: Deferred.  SKIN: No rash, no excessive bruising, petechiae, or purpura.  NEUROLOGIC: The patient is groaning and not following commands. Her left hand is contracted and against her chest. She is moving her right lower extremity and there is some small movement seen on left lower extremity.     ---------------------------------------------------------------------------------------------------------------------  --------------------------------------------------------------------------------------------------------------------  LABS:    CBC and coagulation:  Results from last 7 days   Lab Units 03/14/25  1255   WBC 10*3/mm3 4.79   HEMOGLOBIN g/dL 11.7*   HEMATOCRIT % 34.0   MCV fL 97.1*   MCHC g/dL 34.4   PLATELETS 10*3/mm3 81*     Acid/base balance:      Renal and electrolytes:  Results from last 7 days   Lab Units 03/14/25  1255   SODIUM mmol/L 134*   POTASSIUM mmol/L 4.0   CHLORIDE mmol/L 98   CO2 mmol/L 26.8   BUN mg/dL 15   CREATININE mg/dL 0.68   CALCIUM mg/dL 8.9   GLUCOSE mg/dL 112*     Estimated Creatinine Clearance: 88.5 mL/min (by C-G formula based on SCr of 0.68 mg/dL).    Liver and pancreatic function:  Results from last 7 days   Lab Units 03/14/25  1255   ALBUMIN g/dL 3.9   BILIRUBIN mg/dL <0.2   ALK PHOS U/L 51   AST (SGOT) U/L 28   ALT (SGPT) U/L 16     Endocrine function:  Lab Results   Component Value Date    HGBA1C 4.80 05/15/2024     Point of care bedside glucose levels:      Lab Results   Component Value Date    TSH 2.930 03/14/2025    FREET4 1.5 04/01/2024     Cardiac:  Results from last 7 days   Lab Units 03/14/25  1255   HSTROP T ng/L 20*       Cultures:  Lab Results   Component Value Date    COLORU Dark Yellow (A) 04/17/2024    CLARITYU Clear 04/17/2024    PHUR 5.5 04/17/2024     PROTEINUR Negative 04/01/2024    GLUCOSEU Negative 04/17/2024    KETONESU Trace (A) 04/17/2024    BLOODU Negative 04/17/2024    NITRITEU Negative 04/17/2024    LEUKOCYTESUR Moderate (2+) (A) 04/17/2024    BILIRUBINUR Negative 04/17/2024    UROBILINOGEN 1.0 E.U./dL 04/17/2024    RBCUA None Seen 04/17/2024    WBCUA 6-10 (A) 04/17/2024    BACTERIA Trace (A) 04/17/2024     Microbiology Results (last 10 days)       ** No results found for the last 240 hours. **            Lab Results   Component Value Date    PREGTESTUR Negative 04/17/2024     Pain Management Panel           No data to display                I have personally looked at the labs and they are summarized above.  ----------------------------------------------------------------------------------------------------------------------  Detailed radiology reports for the last 24 hours:    Imaging Results (Last 24 Hours)       Procedure Component Value Units Date/Time    CT Head Without Contrast [[id number]] Collected: 03/14/25 1344     Updated: 03/14/25 1349    Narrative:      PROCEDURE: CT HEAD WO CONTRAST-     HISTORY: eval for ams     COMPARISON: None.     TECHNIQUE: Multiple axial CT images were performed from the foramen  magnum to the vertex. Individualized dose reduction techniques using  automated exposure control or adjustment of mA and/or kV according to  the patient size were employed.     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 The ventricles are enlarged. There is  no evidence of edema or hemorrhage.  No masses are identified. No  extra-axial fluid is seen. The paranasal sinuses demonstrate  mucoperiosteal thickening right frontal, bilateral ethmoid and right  maxillary sinuses. No air-fluid level is seen. Remaining paranasal  sinuses and mastoids are clear.       Impression:      Stable chronic change.     Sinus disease as described.        CTDI: 68.52 mGy  DLP:1439.86 mGy.cm     This report  was signed and finalized on 3/14/2025 1:47 PM by Donna Bran MD.       XR Chest 1 View [[id number]] Collected: 03/14/25 1332     Updated: 03/14/25 1345    Narrative:      PROCEDURE: XR CHEST 1 VW-     HISTORY: AMS Protocol     COMPARISON: 5/15/2024.     FINDINGS: The lung apices are obscured by patient head positioning,  particularly the right lung apex. There is a left-sided vagal nerve  stimulator. There is elevation of the left hemidiaphragm. There is  decreased inspiratory effort. No acute consolidation within the  visualized lung fields. The heart is normal in size. The mediastinum is  unremarkable. There is no pneumothorax.  There are no acute osseous  abnormalities.       Impression:      No acute infiltrate                    Images were reviewed, interpreted, and dictated by Dr. Donna Bran MD  Transcribed by Laureano Woods PA-C.     This report was signed and finalized on 3/14/2025 1:43 PM by Donna Bran MD.             Final impressions for the last 30 days of radiology reports:    CT Head Without Contrast  Result Date: 3/14/2025  Stable chronic change.  Sinus disease as described.   CTDI: 68.52 mGy DLP:1439.86 mGy.cm  This report was signed and finalized on 3/14/2025 1:47 PM by Donna Bran MD.      XR Chest 1 View  Result Date: 3/14/2025  No acute infiltrate       Images were reviewed, interpreted, and dictated by Dr. Donna Bran MD Transcribed by Laureano Woods PA-C.  This report was signed and finalized on 3/14/2025 1:43 PM by Donna Bran MD.          Assessment & Plan      This is a 30 yo female with a PMH of cerebral palsy, hemiparesis of right dominant side due to non-cerebral vascular etiology, eizures, anxiety and hypothyroidism presenting with altered mental status.     Assessment:  Hypotension, now improving  Valproic acid toxicity  Altered mental status  Possible UTI  Cerebral palsy  Hemiparesis  History of seizures  Anxiety  Hypothyroidism    Plan:  - Admit to observation  - CT head with  no acute findings  - Follow-up TSH, B12, urine drug screen and folate  - Given her elevated valproic acid levels, follow-up on ammonia levels as well  - Start IV fluids  - If no improvement in blood pressure with IV fluids, will start pressors  - Consult neurology  - Will hold devalproex and lamotrigine   - Given her mentation, will also hold aripiprazole, citalopram  - Seizure precautions   - F/u urinalysis   - Will hold off on starting any antibiotics at this time  - Monitor on telemetry        Germain Hoyos MD  [facility]  03/14/25  16:30 EDT

## 2025-03-14 NOTE — ED PROVIDER NOTES
[facility] EMERGENCY DEPARTMENT  Emergency Department Encounter  Emergency Medicine Physician Note     Pt Name:Ave Correia  MRN: [id number]  Birthdate 1995  Date of evaluation: 3/14/2025  PCP:  Reyna Loyd APRN  Note Started: 4:40 PM EDT      CHIEF COMPLAINT       Chief Complaint   Patient presents with    Fall     Pt was brought in from the [facility] for a fall.       HISTORY OF PRESENT ILLNESS  (Location/Symptom, Timing/Onset, Context/Setting, Quality, Duration, Modifying Factors, Severity.)      Ave Correia is a 29 y.o. female who presents with for a fall.  Patient was also noted to be aggressive earlier this morning per staff at her [facility].  Patient has a history of seizures and cerebral palsy.  EMS states that she was talking to them initially on arrival and then became unresponsive.  Care at the nursing facility states that she was aggressive earlier this morning, they state that they did not give her any additional medications to try to help.  Patient normally has lower normal blood pressure of 90 systolic over 60-70 diastolic.  Patient has been receiving medications as prescribed.    PAST MEDICAL / SURGICAL / SOCIAL / FAMILY HISTORY     Past Medical History:   Diagnosis Date    Blind right eye     Cerebral palsy     Hemiparesis of right dominant side due to non-cerebrovascular etiology     Seizures      No additional pertinent       Past Surgical History:   Procedure Laterality Date    DENTAL EXAMINATION UNDER ANESTHESIA W/ CLEANING AND XRAYS      Fillings placed.    IMPLANTATION VAGAL NERVE STIMULATOR       No additional pertinent       Social History     Socioeconomic History    Marital status: Single   Tobacco Use    Smoking status: Never    Smokeless tobacco: Never   Vaping Use    Vaping status: Never Used   Substance and Sexual Activity    Alcohol use: Never    Drug use: Never       History reviewed. No pertinent family history.    Allergies:  Versed  [midazolam]    Home Medications:  Prior to Admission medications    Medication Sig Start Date End Date Taking? Authorizing Provider   ARIPiprazole (ABILIFY) 10 MG tablet Take 1 tablet by mouth Daily. 1/27/25   Teressa Abad APRN   bisacodyl (DULCOLAX) 5 MG EC tablet Take 5 mg by mouth Daily As Needed for Constipation.    Kelly Bell MD   citalopram (CeleXA) 20 MG tablet Take 1 tablet by mouth Daily. 1/27/25   Teressa Abad APRN   cloBAZam (ONFI) 10 MG tablet Take 2 tablets by mouth. 3/29/23   Kelly Bell MD   cloBAZam (ONFI) 20 MG tablet 35 mg.    Kelly Bell MD   divalproex (DEPAKOTE) 500 MG DR tablet Take 2 tablets by mouth 2 (Two) Times a Day.    Kelly Bell MD   FeroSul 325 (65 Fe) MG tablet Take 1 tablet by mouth Daily. 4/22/22   Kelly Bell MD   furosemide (LASIX) 20 MG tablet Take 1 tablet by mouth Daily. 4/22/22   Kelly Bell MD   hydrOXYzine (ATARAX) 50 MG tablet Take 1 tablet by mouth 3 (Three) Times a Day. 1/27/25   Teressa Abad APRN   lamoTRIgine (LaMICtal) 25 MG tablet TAKE ONE TABLET BY MOUTH EVERY OTHER DAY FOR 2 WEEKS (STARTS 1/24/25) 1/23/25   Kelly Bell MD   Levonorgest-Eth Estrad 91-Day (Jaimiess) 0.15-0.03 &0.01 MG Take 1 tablet by mouth Daily. 5/15/23   Kelly Bell MD   levothyroxine (SYNTHROID, LEVOTHROID) 75 MCG tablet Take 1 tablet by mouth Daily.    Kelly Bell MD   multivitamin with minerals tablet tablet Take 1 tablet by mouth Daily.    Kelly Bell MD   polyethylene glycol (MIRALAX) 17 GM/SCOOP powder Take 17 g by mouth Daily. 4/22/22   Kelly Bell MD   potassium chloride 10 MEQ CR tablet Take 1 tablet by mouth Daily. 4/22/22   Kelly Bell MD   QUEtiapine (SEROquel) 100 MG tablet Take 1 tablet by mouth Every Night. 1/27/25   Teressa Abad APRN         REVIEW OF SYSTEMS       Review of Systems   Unable to perform ROS: Patient unresponsive        PHYSICAL EXAM      INITIAL VITALS:   BP (!) 89/66   Pulse 68   Temp 97 °F (36.1 °C) (Axillary)   Resp 16   Wt 45.9 kg (101 lb 3.1 oz)   LMP 03/07/2025   SpO2 100%   BMI 22.70 kg/m²     Physical Exam  Constitutional:       General: She is not in acute distress.     Appearance: Normal appearance. She is not ill-appearing.   HENT:      Head: Normocephalic and atraumatic.      Mouth/Throat:      Mouth: Mucous membranes are dry.      Pharynx: Oropharynx is clear.   Eyes:      Pupils: Pupils are equal, round, and reactive to light.      Comments: Pupils are noted to be 4 mm bilaterally and sluggish to light   Cardiovascular:      Rate and Rhythm: Normal rate and regular rhythm.      Pulses: Normal pulses.   Pulmonary:      Effort: Pulmonary effort is normal.      Breath sounds: Normal breath sounds.   Abdominal:      General: Abdomen is flat. There is no distension.      Palpations: Abdomen is soft.      Tenderness: There is no abdominal tenderness. There is no guarding.   Musculoskeletal:      Comments: Patient contracted in the upper extremities, will need follow-up for extremities to pain   Skin:     General: Skin is warm and dry.   Neurological:      Mental Status: She is alert.      GCS: GCS eye subscore is 1. GCS verbal subscore is 3. GCS motor subscore is 4.      Cranial Nerves: No facial asymmetry.      Comments: Patient moves all 4 extremities to pain.           DDX/DIAGNOSTIC RESULTS / EMERGENCY DEPARTMENT COURSE / MDM     Differential Diagnosis included but not limited: Sepsis, urinary tract fracture, intracranial hemorrhage, seizure, elevated ammonia, hypercapnia, psychosomatic cause of patient's altered mental status, dehydration, electrolyte abnormalities, medication induced hypotension and AMS    Diagnoses Considered but Do Not Suspect: Low concern for sepsis after evaluations patient.  No active grand mal seizure on my evaluation.    Decision Rules/Scores utilized: N/A     Tests considered but  not ordered and why:  N/A     MIPS: N/A     Code Status Discussion:  Not Discussed    Additional Patient Education Provided: None     Medical Decision Making    Medical Decision Making   29-year-old female with cerebral palsy and seizure history who presented today altered and hypotensive.  Unable to get a urine at this time, multiple straight cath attempted, patient currently on a bedpan.  Patient's blood pressure is improved after 2 L of IV fluid.  Patient was noted to be somewhat verbal with EMS, has not been verbal here.  Patient was noted to have a fall from standing, falls frequently and wears a helmet.  Her CT head noted to be negative, for intracranial hemorrhage or other cause of altered mental status.  Patient was noted to have some sinusitis on CT imaging.  Patient with no leukocytosis, no tachycardia, not meeting any SIRS criteria.  Patient is on valproic acid, ammonia is pending for valproic acid induced elevated ammonia.  Did not cover patient for sepsis at this time she is meeting no SIRS criteria.  Patient's blood pressure has improved with IV fluids.  Patient has not had a return to baseline.  Patient may be having absence seizure, did discuss this with neurology who feels that this is not the case due to being hypotensive and nontachycardic.  Did request their consultation, they will evaluate patient shortly.  Ammonia and lactate are pending.  Patient did have some mildly elevated troponin, repeat troponin is pending.  Patient has no abdominal pain on palpation.  Pupils were noted to be quite dilated bilaterally and sluggish.  Patient is on multiple seizure medications that could cause altered mentation and neurology feels that this also may be the case.  Patient very minimally hypercapnic and do not feel this is causing altered mentation.  Patient with very minimally elevated ammonia, this could be causing patient's altered mental status but not as likely.  Patient admitted to hospitalist group  for further evaluation, workup, and further care.    Problems Addressed:  [place] coma scale total score 9-12, at arrival to emergency department: complicated acute illness or injury  Hypotension due to hypovolemia: complicated acute illness or injury    Amount and/or Complexity of Data Reviewed  Independent Historian: caregiver  External Data Reviewed: labs and notes.  Labs: ordered. Decision-making details documented in ED Course.  Radiology: ordered and independent interpretation performed.     Details: Personally evaluated CT head, no signs intracranial hemorrhage or obvious infarct  ECG/medicine tests: ordered.  Discussion of management or test interpretation with external provider(s): Neurology for recommendations, hospitalist for admission    Risk  Prescription drug management.  Decision regarding hospitalization.        See ED COURSE for additional MDM statements    EKG    EKG Interpretation    Evaluated and interpreted by emergency department physician    Rhythm: Normal Sinus Rhythm  Rate: Normal  Axis: Yaneth  Ectopy: none  Conduction: Normal  ST Segments: Normal  T Waves: Flattening in multiple leads  Q Waves: Small Q wave in lead III, nonspecific T waves in aVR V1    Clinical Impression: Normal Sinus Rhythm    Dominick Trinh, DO      All EKG's are interpreted by the Emergency Department Physician who either signs or Co-signs this chart in the absence of a cardiologist.    Additional Scores                   EMERGENCY DEPARTMENT COURSE:    ED Course as of 03/14/25 1707   Fri Mar 14, 2025   1420 WBC: 4.79 [CR]   1512 Discussed patient's case with neurology, they feel that this is not a seizure at this time due to not being tachycardic and hypertensive.  Patient does have a history of absence seizure's, do feel some else may be going on at this time and I do agree with this.  Workup at this time  grossly negative though with normal white blood cell count, no signs of endorgan injury at this time,  and no signs of infectious process, CT head and x-ray of the chest are noted to be negative. [CR]   1532 Reevaluated, patient noted to have improving blood pressure after IV fluids.  Plan to check ammonia.  Patient has not been able to provide urine, plan for straight cath for urine evaluation. [CR]   1533 Valproic acid induced hyperammonemia.   [CR]   1543 Patient on multiple antiseizure medications that can lead to somnolence and hypotension.  Further discussion with neurology feels that this may be secondary to medication management. [CR]   1616 Platelets(!): 81  Patient with low platelets, this is 1 low she has had an laboratory evaluation recently [CR]      ED Course User Index  [CR] Dominick Trinh, DO       PROCEDURES:  None Performed   Procedures    DATA FOR LAB AND RADIOLOGY TESTS ORDERED BELOW ARE REVIEWED BY THE ED CLINICIAN:    RADIOLOGY: All x-rays, CT, MRI, and formal ultrasound images (except ED bedside ultrasound) are read by the radiologist, see reports below, unless otherwise noted in MDM or here.  Reports below are reviewed by myself.  CT Head Without Contrast   Final Result   Stable chronic change.       Sinus disease as described.           CTDI: 68.52 mGy   DLP:1439.86 mGy.cm       This report was signed and finalized on 3/14/2025 1:47 PM by Donna Bran MD.          XR Chest 1 View   Final Result   No acute infiltrate                           Images were reviewed, interpreted, and dictated by Dr. Donna Bran MD   Transcribed by Laureano Woods PA-C.       This report was signed and finalized on 3/14/2025 1:43 PM by Donna Bran MD.              LABS: Lab orders shown below, the results are reviewed by myself, and all abnormals are listed below.  Labs Reviewed   COMPREHENSIVE METABOLIC PANEL - Abnormal; Notable for the following components:       Result Value    Glucose 112 (*)     Sodium 134 (*)     All other components within normal limits    Narrative:     GFR Categories in  Chronic Kidney Disease (CKD)      GFR Category          GFR (mL/min/1.73)    Interpretation  G1                     90 or greater         Normal or high (1)  G2                      60-89                Mild decrease (1)  G3a                   45-59                Mild to moderate decrease  G3b                   30-44                Moderate to severe decrease  G4                    15-29                Severe decrease  G5                    14 or less           Kidney failure          (1)In the absence of evidence of kidney disease, neither GFR category G1 or G2 fulfill the criteria for CKD.    eGFR calculation 2021 CKD-EPI creatinine equation, which does not include race as a factor   VALPROIC ACID LEVEL, TOTAL - Abnormal; Notable for the following components:    Valproic Acid >150.0 (*)     All other components within normal limits    Narrative:     Therapeutic Ranges for Valproic Acid    Epilepsy:       mcg/ml  Bipolar/Juany  up to 125 mcg/ml     CBC WITH AUTO DIFFERENTIAL - Abnormal; Notable for the following components:    RBC 3.50 (*)     Hemoglobin 11.7 (*)     MCV 97.1 (*)     MCH 33.4 (*)     Platelets 81 (*)     Lymphocyte % 49.3 (*)     All other components within normal limits   AMMONIA - Abnormal; Notable for the following components:    Ammonia 61 (*)     All other components within normal limits   TROPONIN - Abnormal; Notable for the following components:    HS Troponin T 20 (*)     All other components within normal limits    Narrative:     High Sensitive Troponin T Reference Range:  <14.0 ng/L- Negative Female for AMI  <22.0 ng/L- Negative Male for AMI  >=14 - Abnormal Female indicating possible myocardial injury.  >=22 - Abnormal Male indicating possible myocardial injury.   Clinicians would have to utilize clinical acumen, EKG, Troponin, and serial changes to determine if it is an Acute Myocardial Infarction or myocardial injury due to an underlying chronic condition.        HIGH  SENSITIVITIY TROPONIN T 1HR - Abnormal; Notable for the following components:    HS Troponin T 17 (*)     All other components within normal limits    Narrative:     High Sensitive Troponin T Reference Range:  <14.0 ng/L- Negative Female for AMI  <22.0 ng/L- Negative Male for AMI  >=14 - Abnormal Female indicating possible myocardial injury.  >=22 - Abnormal Male indicating possible myocardial injury.   Clinicians would have to utilize clinical acumen, EKG, Troponin, and serial changes to determine if it is an Acute Myocardial Infarction or myocardial injury due to an underlying chronic condition.        BLOOD GAS, VENOUS W/CO-OXIMETRY - Abnormal; Notable for the following components:    pH, Venous 7.302 (*)     pCO2, Venous 56.2 (*)     All other components within normal limits   HCG, SERUM, QUALITATIVE - Normal   TSH RFX ON ABNORMAL TO FREE T4 - Normal   LACTIC ACID, PLASMA - Normal   RAINBOW DRAW    Narrative:     The following orders were created for panel order [place] Draw.  Procedure                               Abnormality         Status                     ---------                               -----------         ------                     Green Top (Gel)[[id number]]                                  Final result               Lavender Top[[id number]]                                     Final result               Gold Top - SST[[id number]]                                                              Light Blue Top[[id number]]                                   Final result                 Please view results for these tests on the individual orders.   BLOOD GAS, VENOUS   FOLATE   URINE DRUG SCREEN   VITAMIN B12   TSH   URINALYSIS W/ CULTURE IF INDICATED   POCT GLUCOSE FINGERSTICK   CBC AND DIFFERENTIAL    Narrative:     The following orders were created for panel order CBC & Differential.  Procedure                               Abnormality         Status                     ---------                                -----------         ------                     CBC Auto Differential[[id number]]        Abnormal            Final result               Scan Slide[[id number]]                                                                    Please view results for these tests on the individual orders.   GREEN TOP   LAVENDER TOP   LIGHT BLUE TOP       Vitals Reviewed:    Vitals:    03/14/25 1432 03/14/25 1442 03/14/25 1452 03/14/25 1502   BP: (!) 71/48 (!) 68/45 (!) 71/50 (!) 89/66   BP Location:       Patient Position:       Pulse: 66 64 69 68   Resp:       Temp:       TempSrc:       SpO2: 98% 98%  100%   Weight:           MEDICATIONS GIVEN TO PATIENT THIS ENCOUNTER:  Medications   sodium chloride 0.9 % flush 10 mL (has no administration in time range)   sodium chloride 0.9 % infusion (has no administration in time range)   sodium chloride 0.9 % bolus 1,000 mL (0 mL Intravenous Stopped 3/14/25 1329)   lactated ringers bolus 1,000 mL (0 mL Intravenous Stopped 3/14/25 1612)       CONSULTS:  IP CONSULT TO NEUROLOGY    CRITICAL CARE:  There was significant risk of life threatening deterioration of patient's condition requiring my direct management. Critical care time 62 minutes, excluding any documented procedures.    FINAL IMPRESSION      1. [place] coma scale total score 9-12, at arrival to emergency department    2. Hypotension due to hypovolemia          DISPOSITION / PLAN     ED Disposition       ED Disposition   Decision to Admit    Condition   --    Comment   Level of Care: Telemetry [5]   Diagnosis: Altered mental status [780.97.ICD-9-CM]   Admitting Physician: RACHEL PIRES [[id number]]                 PATIENT REFERRED TO:  No follow-up provider specified.    DISCHARGE MEDICATIONS:     Medication List        ASK your doctor about these medications      ARIPiprazole 10 MG tablet  Commonly known as: ABILIFY  Take 1 tablet by mouth Daily.     bisacodyl 5 MG EC tablet  Commonly known as: DULCOLAX     citalopram 20 MG  tablet  Commonly known as: CeleXA  Take 1 tablet by mouth Daily.     * cloBAZam 20 MG tablet  Commonly known as: ONFI     * cloBAZam 10 MG tablet  Commonly known as: ONFI     divalproex 500 MG DR tablet  Commonly known as: DEPAKOTE     FeroSul 325 (65 Fe) MG tablet  Generic drug: ferrous sulfate     furosemide 20 MG tablet  Commonly known as: LASIX     hydrOXYzine 50 MG tablet  Commonly known as: ATARAX  Take 1 tablet by mouth 3 (Three) Times a Day.     Jaimiess 0.15-0.03 &0.01 MG  Generic drug: Levonorgest-Eth Estrad 91-Day     lamoTRIgine 25 MG tablet  Commonly known as: LaMICtal     levothyroxine 75 MCG tablet  Commonly known as: SYNTHROID, LEVOTHROID     multivitamin with minerals tablet tablet     polyethylene glycol 17 GM/SCOOP powder  Commonly known as: MIRALAX     potassium chloride 10 MEQ CR tablet     QUEtiapine 100 MG tablet  Commonly known as: SEROquel  Take 1 tablet by mouth Every Night.           * This list has 2 medication(s) that are the same as other medications prescribed for you. Read the directions carefully, and ask your doctor or other care provider to review them with you.                  Electronically signed by Dominick Trinh DO, 03/14/25, 4:40 PM EDT.    Emergency Medicine Physician  [facility]  (Please note that portions of thisnote were completed with a voice recognition program.  Efforts were made to edit the dictations but occasionally words are mis-transcribed.)       Dominick Trinh DO  03/14/25 5694

## 2025-03-15 PROBLEM — G80.9 CEREBRAL PALSY: Status: ACTIVE | Noted: 2025-03-15

## 2025-03-15 PROBLEM — G40.909 SEIZURE DISORDER: Status: ACTIVE | Noted: 2025-03-15

## 2025-03-15 LAB
AMPHET+METHAMPHET UR QL: NEGATIVE
AMPHETAMINES UR QL: NEGATIVE
ANION GAP SERPL CALCULATED.3IONS-SCNC: 5.8 MMOL/L (ref 5–15)
BARBITURATES UR QL SCN: NEGATIVE
BENZODIAZ UR QL SCN: POSITIVE
BUN SERPL-MCNC: 11 MG/DL (ref 6–20)
BUN/CREAT SERPL: 17.2 (ref 7–25)
BUPRENORPHINE SERPL-MCNC: NEGATIVE NG/ML
CALCIUM SPEC-SCNC: 7.9 MG/DL (ref 8.6–10.5)
CANNABINOIDS SERPL QL: NEGATIVE
CHLORIDE SERPL-SCNC: 104 MMOL/L (ref 98–107)
CO2 SERPL-SCNC: 28.2 MMOL/L (ref 22–29)
COCAINE UR QL: NEGATIVE
CREAT SERPL-MCNC: 0.64 MG/DL (ref 0.57–1)
DEPRECATED RDW RBC AUTO: 48.7 FL (ref 37–54)
EGFRCR SERPLBLD CKD-EPI 2021: 122.9 ML/MIN/1.73
ERYTHROCYTE [DISTWIDTH] IN BLOOD BY AUTOMATED COUNT: 13.3 % (ref 12.3–15.4)
FENTANYL UR-MCNC: NEGATIVE NG/ML
GLUCOSE SERPL-MCNC: 68 MG/DL (ref 65–99)
HCT VFR BLD AUTO: 29.2 % (ref 34–46.6)
HGB BLD-MCNC: 9.7 G/DL (ref 12–15.9)
LYMPHOCYTES # BLD MANUAL: 3.15 10*3/MM3 (ref 0.7–3.1)
MCH RBC QN AUTO: 33.1 PG (ref 26.6–33)
MCHC RBC AUTO-ENTMCNC: 33.2 G/DL (ref 31.5–35.7)
MCV RBC AUTO: 99.7 FL (ref 79–97)
METHADONE UR QL SCN: NEGATIVE
NEUTROPHILS # BLD AUTO: 0.99 10*3/MM3 (ref 1.7–7)
NEUTROPHILS NFR BLD MANUAL: 24 % (ref 42.7–76)
OPIATES UR QL: NEGATIVE
OXYCODONE UR QL SCN: NEGATIVE
PCP UR QL SCN: NEGATIVE
PLATELET # BLD AUTO: 66 10*3/MM3 (ref 140–450)
PMV BLD AUTO: 11 FL (ref 6–12)
POTASSIUM SERPL-SCNC: 4.7 MMOL/L (ref 3.5–5.2)
PROCALCITONIN SERPL-MCNC: <0.02 NG/ML (ref 0–0.25)
RBC # BLD AUTO: 2.93 10*6/MM3 (ref 3.77–5.28)
RBC MORPH BLD: NORMAL
SMALL PLATELETS BLD QL SMEAR: ABNORMAL
SODIUM SERPL-SCNC: 138 MMOL/L (ref 136–145)
TRICYCLICS UR QL SCN: NEGATIVE
VALPROATE SERPL-MCNC: 63.6 MCG/ML (ref 50–125)
VARIANT LYMPHS NFR BLD MANUAL: 76 % (ref 19.6–45.3)
WBC MORPH BLD: NORMAL
WBC NRBC COR # BLD AUTO: 4.14 10*3/MM3 (ref 3.4–10.8)

## 2025-03-15 PROCEDURE — 99222 1ST HOSP IP/OBS MODERATE 55: CPT | Performed by: PSYCHIATRY & NEUROLOGY

## 2025-03-15 PROCEDURE — 85007 BL SMEAR W/DIFF WBC COUNT: CPT | Performed by: STUDENT IN AN ORGANIZED HEALTH CARE EDUCATION/TRAINING PROGRAM

## 2025-03-15 PROCEDURE — 25810000003 SODIUM CHLORIDE 0.9 % SOLUTION: Performed by: FAMILY MEDICINE

## 2025-03-15 PROCEDURE — 80048 BASIC METABOLIC PNL TOTAL CA: CPT | Performed by: STUDENT IN AN ORGANIZED HEALTH CARE EDUCATION/TRAINING PROGRAM

## 2025-03-15 PROCEDURE — G0378 HOSPITAL OBSERVATION PER HR: HCPCS

## 2025-03-15 PROCEDURE — 25810000003 SODIUM CHLORIDE 0.9 % SOLUTION: Performed by: STUDENT IN AN ORGANIZED HEALTH CARE EDUCATION/TRAINING PROGRAM

## 2025-03-15 PROCEDURE — 25010000002 ZIPRASIDONE MESYLATE PER 10 MG: Performed by: PSYCHIATRY & NEUROLOGY

## 2025-03-15 PROCEDURE — 85025 COMPLETE CBC W/AUTO DIFF WBC: CPT | Performed by: STUDENT IN AN ORGANIZED HEALTH CARE EDUCATION/TRAINING PROGRAM

## 2025-03-15 PROCEDURE — 99232 SBSQ HOSP IP/OBS MODERATE 35: CPT | Performed by: INTERNAL MEDICINE

## 2025-03-15 PROCEDURE — 25010000002 LORAZEPAM PER 2 MG: Performed by: FAMILY MEDICINE

## 2025-03-15 PROCEDURE — 80307 DRUG TEST PRSMV CHEM ANLYZR: CPT | Performed by: STUDENT IN AN ORGANIZED HEALTH CARE EDUCATION/TRAINING PROGRAM

## 2025-03-15 PROCEDURE — 80299 QUANTITATIVE ASSAY DRUG: CPT | Performed by: STUDENT IN AN ORGANIZED HEALTH CARE EDUCATION/TRAINING PROGRAM

## 2025-03-15 PROCEDURE — 80164 ASSAY DIPROPYLACETIC ACD TOT: CPT | Performed by: STUDENT IN AN ORGANIZED HEALTH CARE EDUCATION/TRAINING PROGRAM

## 2025-03-15 RX ORDER — HALOPERIDOL 5 MG/ML
1 INJECTION INTRAMUSCULAR EVERY 6 HOURS PRN
Status: DISCONTINUED | OUTPATIENT
Start: 2025-03-15 | End: 2025-03-15

## 2025-03-15 RX ORDER — LORAZEPAM 2 MG/ML
1 INJECTION INTRAMUSCULAR EVERY 4 HOURS PRN
Status: DISCONTINUED | OUTPATIENT
Start: 2025-03-15 | End: 2025-03-19 | Stop reason: HOSPADM

## 2025-03-15 RX ORDER — ZIPRASIDONE MESYLATE 20 MG/ML
20 INJECTION, POWDER, LYOPHILIZED, FOR SOLUTION INTRAMUSCULAR EVERY 4 HOURS PRN
Status: DISCONTINUED | OUTPATIENT
Start: 2025-03-15 | End: 2025-03-19 | Stop reason: HOSPADM

## 2025-03-15 RX ORDER — DIVALPROEX SODIUM 125 MG/1
125 CAPSULE, COATED PELLETS ORAL EVERY 6 HOURS SCHEDULED
Status: DISCONTINUED | OUTPATIENT
Start: 2025-03-15 | End: 2025-03-17

## 2025-03-15 RX ADMIN — DIVALPROEX SODIUM 125 MG: 125 CAPSULE ORAL at 21:13

## 2025-03-15 RX ADMIN — LORAZEPAM 1 MG: 2 INJECTION INTRAMUSCULAR; INTRAVENOUS at 13:42

## 2025-03-15 RX ADMIN — SODIUM CHLORIDE 500 ML: 9 INJECTION, SOLUTION INTRAVENOUS at 01:45

## 2025-03-15 RX ADMIN — Medication 10 ML: at 08:28

## 2025-03-15 RX ADMIN — CLOBAZAM 20 MG: 10 TABLET ORAL at 21:13

## 2025-03-15 RX ADMIN — SODIUM CHLORIDE 75 ML/HR: 9 INJECTION, SOLUTION INTRAVENOUS at 09:48

## 2025-03-15 RX ADMIN — Medication 10 ML: at 21:14

## 2025-03-15 RX ADMIN — Medication 10 ML: at 13:42

## 2025-03-15 RX ADMIN — SODIUM CHLORIDE 75 ML/HR: 9 INJECTION, SOLUTION INTRAVENOUS at 00:45

## 2025-03-15 RX ADMIN — DIVALPROEX SODIUM 125 MG: 125 CAPSULE ORAL at 12:21

## 2025-03-15 RX ADMIN — ZIPRASIDONE MESYLATE 20 MG: 20 INJECTION, POWDER, LYOPHILIZED, FOR SOLUTION INTRAMUSCULAR at 16:17

## 2025-03-15 RX ADMIN — LORAZEPAM 1 MG: 2 INJECTION INTRAMUSCULAR; INTRAVENOUS at 03:45

## 2025-03-15 RX ADMIN — SODIUM CHLORIDE 75 ML/HR: 9 INJECTION, SOLUTION INTRAVENOUS at 23:07

## 2025-03-15 NOTE — PLAN OF CARE
Goal Outcome Evaluation:  Plan of Care Reviewed With: patient        Progress: no change        Problem: Adult Inpatient Plan of Care  Goal: Plan of Care Review  Outcome: Progressing  Flowsheets (Taken 3/15/2025 0407)  Progress: no change  Plan of Care Reviewed With: patient  Goal: Patient-Specific Goal (Individualized)  Outcome: Progressing  Goal: Absence of Hospital-Acquired Illness or Injury  Outcome: Progressing  Intervention: Identify and Manage Fall Risk  Recent Flowsheet Documentation  Taken 3/15/2025 0200 by Latanya Jiménez LPN  Safety Promotion/Fall Prevention: safety round/check completed  Taken 3/15/2025 0000 by Tincher, Latanya, LPN  Safety Promotion/Fall Prevention:   activity supervised   assistive device/personal items within reach   clutter free environment maintained   fall prevention program maintained   nonskid shoes/slippers when out of bed   room organization consistent   safety round/check completed  Taken 3/14/2025 2200 by Tincher, Latanya, LPN  Safety Promotion/Fall Prevention:   activity supervised   clutter free environment maintained   assistive device/personal items within reach   fall prevention program maintained   nonskid shoes/slippers when out of bed   room organization consistent   safety round/check completed  Taken 3/14/2025 2020 by Tincher, Latanya, LPN  Safety Promotion/Fall Prevention:   activity supervised   assistive device/personal items within reach   clutter free environment maintained   fall prevention program maintained   nonskid shoes/slippers when out of bed   room organization consistent   safety round/check completed  Intervention: Prevent Skin Injury  Recent Flowsheet Documentation  Taken 3/15/2025 0200 by Latanya Jiménez LPN  Body Position:   supine   position changed independently   patient/family refused  Taken 3/15/2025 0000 by Latanya Jiménez LPN  Body Position: supine  Taken 3/14/2025 2200 by Latanya Jiménez LPN  Body Position:  supine  Taken 3/14/2025 2020 by Latanya Jiménez LPN  Body Position: sitting up in bed  Skin Protection:   incontinence pads utilized   pulse oximeter probe site changed   transparent dressing maintained  Intervention: Prevent and Manage VTE (Venous Thromboembolism) Risk  Recent Flowsheet Documentation  Taken 3/14/2025 2020 by Latanya Jiménez LPN  VTE Prevention/Management: (see MAR) other (see comments)  Intervention: Prevent Infection  Recent Flowsheet Documentation  Taken 3/15/2025 0200 by Latanya Jiménez LPN  Infection Prevention:   environmental surveillance performed   rest/sleep promoted   single patient room provided  Taken 3/15/2025 0000 by Latanya Jiménez LPN  Infection Prevention:   environmental surveillance performed   rest/sleep promoted   single patient room provided  Taken 3/14/2025 2200 by Latanya Jiménez LPN  Infection Prevention:   environmental surveillance performed   rest/sleep promoted   single patient room provided  Taken 3/14/2025 2020 by Latanya Jiménez LPN  Infection Prevention:   environmental surveillance performed   rest/sleep promoted   single patient room provided  Goal: Optimal Comfort and Wellbeing  Outcome: Progressing  Intervention: Provide Person-Centered Care  Recent Flowsheet Documentation  Taken 3/14/2025 2020 by Latanya Jiménez LPN  Trust Relationship/Rapport:   care explained   choices provided   emotional support provided  Goal: Readiness for Transition of Care  Outcome: Progressing     Problem: Violence Risk or Actual  Goal: Anger and Impulse Control  Outcome: Progressing  Intervention: Minimize Safety Risk  Recent Flowsheet Documentation  Taken 3/15/2025 0200 by Latanya Jiménez LPN  Enhanced Safety Measures: bed alarm set  Taken 3/15/2025 0000 by Latanya Jiménez LPN  Enhanced Safety Measures: bed alarm set  Taken 3/14/2025 2200 by Latanya Jiménez LPN  Enhanced Safety Measures: bed alarm set  Taken 3/14/2025 2020 by Jessy  SUSAN Pelaez  Sensory Stimulation Regulation:   care clustered   lighting decreased  Enhanced Safety Measures: bed alarm set  Intervention: Promote Self-Control  Recent Flowsheet Documentation  Taken 3/14/2025 2020 by Latanya Jiménez LPN  Environmental Support: calm environment promoted     Problem: Skin Injury Risk Increased  Goal: Skin Health and Integrity  Outcome: Progressing  Intervention: Optimize Skin Protection  Recent Flowsheet Documentation  Taken 3/15/2025 0200 by Latanya Jiménez LPN  Activity Management: activity minimized  Head of Bed (HOB) Positioning: HOB elevated  Taken 3/15/2025 0000 by Latanya Jiménez LPN  Activity Management: activity minimized  Head of Bed (HOB) Positioning: HOB elevated  Taken 3/14/2025 2200 by Latanya Jiménez LPN  Activity Management: activity minimized  Head of Bed (HOB) Positioning: HOB elevated  Taken 3/14/2025 2020 by Latanya Jiménez LPN  Activity Management: activity minimized  Pressure Reduction Techniques:   frequent weight shift encouraged   weight shift assistance provided  Head of Bed (HOB) Positioning: HOB elevated  Pressure Reduction Devices: positioning supports utilized  Skin Protection:   incontinence pads utilized   pulse oximeter probe site changed   transparent dressing maintained     Problem: Comorbidity Management  Goal: Maintenance of Asthma Control  Outcome: Progressing  Goal: Maintenance of Behavioral Health Symptom Control  Outcome: Progressing  Goal: Maintenance of COPD Symptom Control  Outcome: Progressing  Goal: Blood Glucose Level Within Target Range  Outcome: Progressing  Goal: Maintenance of Heart Failure Symptom Control  Outcome: Progressing  Goal: Blood Pressure in Desired Range  Outcome: Progressing  Goal: Maintenance of Osteoarthritis Symptom Control  Outcome: Progressing  Intervention: Maintain Osteoarthritis Symptom Control  Recent Flowsheet Documentation  Taken 3/15/2025 0200 by Latanya Jiménez LPN  Activity  Management: activity minimized  Taken 3/15/2025 0000 by Latanya Jiménez LPN  Activity Management: activity minimized  Taken 3/14/2025 2200 by Latanya Jiménez LPN  Activity Management: activity minimized  Taken 3/14/2025 2020 by Latanya Jiménez LPN  Activity Management: activity minimized  Goal: Bariatric Home Regimen Maintained  Outcome: Progressing  Goal: Maintenance of Seizure Control  Outcome: Progressing  Intervention: Maintain Seizure Symptom Control  Recent Flowsheet Documentation  Taken 3/14/2025 2020 by Latanya Jiménez LPN  Sensory Stimulation Regulation:   care clustered   lighting decreased     Problem: Fall Injury Risk  Goal: Absence of Fall and Fall-Related Injury  Outcome: Progressing  Intervention: Promote Injury-Free Environment  Recent Flowsheet Documentation  Taken 3/15/2025 0200 by Latanya Jiménez LPN  Safety Promotion/Fall Prevention: safety round/check completed  Taken 3/15/2025 0000 by Tincher, Latanya, LPN  Safety Promotion/Fall Prevention:   activity supervised   assistive device/personal items within reach   clutter free environment maintained   fall prevention program maintained   nonskid shoes/slippers when out of bed   room organization consistent   safety round/check completed  Taken 3/14/2025 2200 by Tincher, Latanya, LPN  Safety Promotion/Fall Prevention:   activity supervised   clutter free environment maintained   assistive device/personal items within reach   fall prevention program maintained   nonskid shoes/slippers when out of bed   room organization consistent   safety round/check completed  Taken 3/14/2025 2020 by Tincher, Latanya, LPN  Safety Promotion/Fall Prevention:   activity supervised   assistive device/personal items within reach   clutter free environment maintained   fall prevention program maintained   nonskid shoes/slippers when out of bed   room organization consistent   safety round/check completed

## 2025-03-15 NOTE — NURSING NOTE
Patient has been resting so far but at this time presents tearful and audibly crying and unable to be consoled. Called caregiver Caroline who stated she would be up to visit patient later, attempted to soothe patient and decrease anxiety with staff staying at the bedside, and explaining caregiver would be to see her as well as offering distraction and positive reinforcements, administered ativan as ordered prn, after approx. 30min pt noted to be resting. Will monitor patient.

## 2025-03-15 NOTE — PROGRESS NOTES
"      [facility]    Name:  Ave Correia   Age:  29 y.o.  Sex:  female  :  1995  MRN:  [id number]   Visit Number:  [id number]  Admission Date:  3/14/2025  Date Of Service:  03/15/25  Primary Care Physician:  Reyna Loyd APRN     LOS: 0 days :    Chief Complaint:      Follow-up of altered mental status.    Subjective:    Ave Correia was seen and examined this morning.  She is currently sitting up on the bed and is still drowsy but wakes up on call and is mostly nonverbal.  Patient is from group home and apparently is able to feed herself and take her medications regularly.  Unable to obtain review of systems from the patient at this time.    Hospital Course:    Ave Correia is a 28 yo female with a PMH of cerebral palsy, hemiparesis of right dominant side due to non-cerebral vascular etiology, eizures, anxiety and hypothyroidism presenting with altered mental status.  Of note, she is currently staying at a residential program and one of the staff members is at the bedside explaining what has occurred.  It appears that the patient at baseline can have full conversation although her speech is a little impaired and sometimes difficult to understand.  She is also able to ambulate despite having some difficulties due to her left hemiparesis.  It appears that around 3 AM this morning, she woke up screaming \"I hate you\", \"I will kill you\" until about 8 AM.  She went back to her baseline until around 12:30 when she fell to the floor.  The patient then became combative, and refused to get up. Staff did notice that her urine had a foul smell and looked dark.  EMS was called and she was found to be hypotensive and brought to our ED.  At time of admission, the patient was not following commands, and is groaning.      In ED, blood pressure 66/41, heart rate 75, respiratory rate of 16, temperature 97 O2 saturation 95% on room air.  Labs on arrival showed a sodium 134, " potassium 4, BUN 15, creatinine 0.6, WBC 4, hemoglobin 11.7, and platelet count 81.  Patient's random valproic acid over 150.  Chest x-ray showed no acute infiltrates.  CT head showed no acute findings.  The patient was started on IV fluids and her blood pressure has improved to 101/73.  She was subsequently admitted to the medical floor with telemetry for further evaluation.    Patient was seen by Dr. Jackson from telemetry neurology.  He recommended to discontinue Keppra due to her drowsiness and recommended to continue Depakote sprinkles and recommended to use clobazam only at night and not during the daytime.  Neurology also recommended not to use lamotrigine along with Depakote as this can cause serious set up for Samir Ronald syndrome.    Review of Systems:     All systems were reviewed and negative except as mentioned in subjective, assessment and plan.    Vital Signs:    Temp:  [95.9 °F (35.5 °C)-97.5 °F (36.4 °C)] 97.5 °F (36.4 °C)  Heart Rate:  [64-94] 77  Resp:  [16] 16  BP: ()/(45-89) 90/62    Intake and output:    I/O last 3 completed shifts:  In: 2768.8 [P.O.:60; I.V.:708.8; IV Piggyback:2000]  Out: 550 [Urine:550]  I/O this shift:  In: 240 [P.O.:240]  Out: 300 [Urine:300]    Physical Examination:    General Appearance:  Alert and cooperative.    Head:  Atraumatic and normocephalic.  Contracture of the neck noted.   Eyes: Conjunctivae and sclerae normal, no icterus. No pallor.   Throat: No oral lesions, no thrush, oral mucosa moist.   Neck: Supple, trachea midline, no thyromegaly.   Lungs:   Breath sounds heard bilaterally equally.  No wheezing or crackles. No Pleural rub or bronchial breathing.   Heart:  Normal S1 and S2, no murmur, no gallop, no rub. No JVD.   Abdomen:   Normal bowel sounds, no masses, no organomegaly. Soft, nontender, nondistended, no rebound tenderness.   Extremities: Supple, 2+ pitting ankle and leg edema, no cyanosis, no clubbing.  Bilateral and upper and lower extremity  contractures noted.   Skin: No bleeding or rash.   Neurologic: Alert on sternal rub but drowsy. No facial asymmetry. Moves all four limbs but does have generalized weakness. No tremors.    Laboratory results:    Results from last 7 days   Lab Units 03/15/25  0606 03/14/25  1255   SODIUM mmol/L 138 134*   POTASSIUM mmol/L 4.7 4.0   CHLORIDE mmol/L 104 98   CO2 mmol/L 28.2 26.8   BUN mg/dL 11 15   CREATININE mg/dL 0.64 0.68   CALCIUM mg/dL 7.9* 8.9   BILIRUBIN mg/dL  --  <0.2   ALK PHOS U/L  --  51   ALT (SGPT) U/L  --  16   AST (SGOT) U/L  --  28   GLUCOSE mg/dL 68 112*     Results from last 7 days   Lab Units 03/15/25  0605 03/14/25  1255   WBC 10*3/mm3 4.14 4.79   HEMOGLOBIN g/dL 9.7* 11.7*   HEMATOCRIT % 29.2* 34.0   PLATELETS 10*3/mm3 66* 81*       Results from last 7 days   Lab Units 03/14/25  1612 03/14/25  1255   HSTROP T ng/L 17* 20*       I have reviewed the patient's laboratory results.    Radiology results:    CT Head Without Contrast  Result Date: 3/14/2025  PROCEDURE: CT HEAD WO CONTRAST-  HISTORY: eval for ams  COMPARISON: None.  TECHNIQUE: Multiple axial CT images were performed from the foramen magnum to the vertex. Individualized dose reduction techniques using automated exposure control or adjustment of mA and/or kV according to the patient size were employed.  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 The ventricles are enlarged. There is no evidence of edema or hemorrhage.  No masses are identified. No extra-axial fluid is seen. The paranasal sinuses demonstrate mucoperiosteal thickening right frontal, bilateral ethmoid and right maxillary sinuses. No air-fluid level is seen. Remaining paranasal sinuses and mastoids are clear.      Impression: Stable chronic change.  Sinus disease as described.   CTDI: 68.52 mGy DLP:1439.86 mGy.cm  This report was signed and finalized on 3/14/2025 1:47 PM by Donna Bran MD.      XR Chest 1 View  Result Date:  3/14/2025  PROCEDURE: XR CHEST 1 VW-  HISTORY: AMS Protocol  COMPARISON: 5/15/2024.  FINDINGS: The lung apices are obscured by patient head positioning, particularly the right lung apex. There is a left-sided vagal nerve stimulator. There is elevation of the left hemidiaphragm. There is decreased inspiratory effort. No acute consolidation within the visualized lung fields. The heart is normal in size. The mediastinum is unremarkable. There is no pneumothorax.  There are no acute osseous abnormalities.      Impression: No acute infiltrate       Images were reviewed, interpreted, and dictated by Dr. Donna Bran MD Transcribed by Laureano Woods PA-C.  This report was signed and finalized on 3/14/2025 1:43 PM by Donna Bran MD.      I have reviewed the patient's radiology reports.    Medication Review:     I have reviewed the patient's active and prn medications.     Problem List:      Altered mental status    Cerebral palsy    Seizure disorder    Assessment:    Acute metabolic encephalopathy, POA.  Hypovolemic hypotension, POA, improved.  Suspected valproic acid toxicity, POA.  Cerebral palsy.  Seizure disorder.  Hypothyroidism.  Chronic thrombocytopenia.    Plan:    Altered mental status/metabolic encephalopathy.  - Exact etiology of this is uncertain but could be related to medications, sepsis and hypotension.  - Patient was seen by Dr. Jackson from neurology and have discussed the patient's treatment plan with him.  - He recommended to discontinue Keppra but continue Depakote.  He recommended to use the clobazam only at night.  He also recommended to discontinue lamotrigine as it can cause high risk for Weeks-Ronald syndrome when used concurrently with Depakote.    Suspected valproic acid toxicity.  - Patient's valproic acid level done yesterday was random and it can be elevated.  Repeat test done this morning was within normal range.  - Neurology recommended repeating it again tomorrow.    Hypovolemic  hypotension.  - Improved with IV hydration.  - No evidence of sepsis at this time as she has not been febrile and does not have leukocytosis.  - Urine analysis was fairly unremarkable and chest x-ray showed no infiltrate.  Respiratory panel was negative.    I have reviewed the copied text and it is accurate as of 03/15/25    DVT Prophylaxis: SCDs (Lovenox discontinued due to thrombocytopenia)  Code Status: Full  Diet: Regular  Discharge Plan: Pending    Tate Ojeda MD  03/15/25  14:29 EDT    Dictated utilizing Dragon dictation.

## 2025-03-15 NOTE — CONSULTS
DOS: 3/15/2025  NAME: Ave Correia   : 1995  PCP: Reyna Loyd APRN  CC: Patient is very drowsy but brought in yesterday because questionable seizure versus behavioral changes.  Referring MD: Germain Hoyos MD, Dr. Tate Ojeda    Neurological Problem and Interval History:  29 y.o. 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  Also has a history of seizures.  Question is whether she had a breakthrough seizure in her group home or whether she had behavioral issues as when she came to the emergency room she was uncooperative with examination.  However she was also found to have urinary tract infection which is being treated.  When I saw her this morning through the telemedicine device she was very sleepy but she was arousable.  She did not say much but as per the nursing staff she did eat her scrambled eggs and there is evidence of it on her gown.  Hard to make her follow any commands as she is so sleepy.  However she is sitting upright without tilting to any side.    Past Medical/Surgical Hx:  Past Medical History:   Diagnosis Date    Blind right eye     Cerebral palsy     Hemiparesis of right dominant side due to non-cerebrovascular etiology     Seizures      Past Surgical History:   Procedure Laterality Date    DENTAL EXAMINATION UNDER ANESTHESIA W/ CLEANING AND XRAYS      Fillings placed.    IMPLANTATION VAGAL NERVE STIMULATOR         Review of Systems:    Constitutional: Patient sleepy resting comfortably in an upright position.  Cardiovascular: No chest pain or palpitations noted.  Respiratory: No shortness of breath noted.  Gastrointestinal: No nausea and vomiting noted.  Genitourinary: Had difficulty with voiding with straight cath which caused her to have some behavioral issues but currently voiding on own.  Musculoskeletal: No weakness of the upper and lower extremities noted.  Dermatological: No skin breakdown  noted.  Neurological: No seizure activity noted so far.  Psychiatric: Has behavioral issues from her underlying condition.  Ophthalmological: No visual changes noted so far.          Medications On Admission  Medications Prior to Admission   Medication Sig Dispense Refill Last Dose/Taking    ARIPiprazole (ABILIFY) 10 MG tablet Take 1 tablet by mouth Daily. 30 tablet 3 3/14/2025    citalopram (CeleXA) 20 MG tablet Take 1 tablet by mouth Daily. 31 tablet 3 3/14/2025    cloBAZam (ONFI) 10 MG tablet Take 2 tablets by mouth.   3/14/2025 Morning    cloBAZam (ONFI) 20 MG tablet 35 mg.   3/14/2025    divalproex (DEPAKOTE) 500 MG DR tablet Take 2 tablets by mouth 2 (Two) Times a Day.   3/14/2025    FeroSul 325 (65 Fe) MG tablet Take 1 tablet by mouth Daily.   3/14/2025    hydrOXYzine (ATARAX) 50 MG tablet Take 1 tablet by mouth 3 (Three) Times a Day. 90 tablet 3 Past Week    levETIRAcetam (KEPPRA) 500 MG tablet Take 2 tablets by mouth 2 (Two) Times a Day.   3/14/2025    levothyroxine (SYNTHROID, LEVOTHROID) 75 MCG tablet Take 1 tablet by mouth Daily.   3/14/2025 Morning    pantoprazole (PROTONIX) 40 MG EC tablet Take 1 tablet by mouth Daily.   3/14/2025    polyethylene glycol (MIRALAX) 17 GM/SCOOP powder Take 17 g by mouth Daily.   Past Month    QUEtiapine (SEROquel) 100 MG tablet Take 1 tablet by mouth Every Night. (Patient taking differently: Take 0.5 tablets by mouth Every Night.) 30 tablet 3 3/13/2025 Bedtime    bisacodyl (DULCOLAX) 5 MG EC tablet Take 1 tablet by mouth Daily As Needed for Constipation.   More than a month    diazePAM, 15 MG Dose, (VALTOCO) 2 x 7.5 MG/0.1ML liquid therapy pack Administer 0.2 mL into the nostril(s) as directed by provider 1 (One) Time As Needed for Seizures. Instill 1 spray (per device) into one nostril once as needed for seizure. If a second dose is required, it may be administered 4 hours after the initial dose. Do not exceed 2 doses to treat a single episode.   More than a month     lamoTRIgine (LaMICtal) 25 MG tablet Take 4 tablets by mouth Every Night.       Levonorgest-Eth Estrad 91-Day (Jaimiess) 0.15-0.03 &0.01 MG Take 1 tablet by mouth Daily.   Unknown    multivitamin with minerals tablet tablet Take 1 tablet by mouth Daily.       norethindrone-ethinyl estradiol (Junel 1/20) 1-20 MG-MCG per tablet Take 1 tablet by mouth Daily.   Unknown       Prior to Admission medications    Medication Sig Start Date End Date Taking? Authorizing Provider   ARIPiprazole (ABILIFY) 10 MG tablet Take 1 tablet by mouth Daily. 1/27/25  Yes Teressa Abad APRN   citalopram (CeleXA) 20 MG tablet Take 1 tablet by mouth Daily. 1/27/25  Yes Teressa Abad APRN   cloBAZam (ONFI) 10 MG tablet Take 2 tablets by mouth. 3/29/23  Yes ProviderKelly MD   cloBAZam (ONFI) 20 MG tablet 35 mg.   Yes Provider, MD Kelly   divalproex (DEPAKOTE) 500 MG DR tablet Take 2 tablets by mouth 2 (Two) Times a Day.   Yes Provider, Historical, MD   FeroSul 325 (65 Fe) MG tablet Take 1 tablet by mouth Daily. 4/22/22  Yes ProviderKelly MD   hydrOXYzine (ATARAX) 50 MG tablet Take 1 tablet by mouth 3 (Three) Times a Day. 1/27/25  Yes Teressa Abad APRN   levETIRAcetam (KEPPRA) 500 MG tablet Take 2 tablets by mouth 2 (Two) Times a Day.   Yes Provider, MD Kelly   levothyroxine (SYNTHROID, LEVOTHROID) 75 MCG tablet Take 1 tablet by mouth Daily.   Yes Provider, MD Kelly   pantoprazole (PROTONIX) 40 MG EC tablet Take 1 tablet by mouth Daily.   Yes ProviderKelly MD   polyethylene glycol (MIRALAX) 17 GM/SCOOP powder Take 17 g by mouth Daily. 4/22/22  Yes Kelly Bell MD   QUEtiapine (SEROquel) 100 MG tablet Take 1 tablet by mouth Every Night.  Patient taking differently: Take 0.5 tablets by mouth Every Night. 1/27/25  Yes Jenniffer, Teressa B, APRN   bisacodyl (DULCOLAX) 5 MG EC tablet Take 1 tablet by mouth Daily As Needed for Constipation.    Provider, MD Kelly   diazePAM, 15 MG  Dose, (VALTOCO) 2 x 7.5 MG/0.1ML liquid therapy pack Administer 0.2 mL into the nostril(s) as directed by provider 1 (One) Time As Needed for Seizures. Instill 1 spray (per device) into one nostril once as needed for seizure. If a second dose is required, it may be administered 4 hours after the initial dose. Do not exceed 2 doses to treat a single episode.    Kelly Bell MD   lamoTRIgine (LaMICtal) 25 MG tablet Take 4 tablets by mouth Every Night. 1/23/25   Kelly Bell MD   Levonorgest-Eth Estrad 91-Day (Jaimiess) 0.15-0.03 &0.01 MG Take 1 tablet by mouth Daily. 5/15/23   Kelly Bell MD   multivitamin with minerals tablet tablet Take 1 tablet by mouth Daily.    Kelly Bell MD   norethindrone-ethinyl estradiol (Junel 1/20) 1-20 MG-MCG per tablet Take 1 tablet by mouth Daily.    Kelly Bell MD        Allergies:  Allergies   Allergen Reactions    Versed [Midazolam] Unknown - High Severity       Social Hx:  Social History     Socioeconomic History    Marital status: Single   Tobacco Use    Smoking status: Never    Smokeless tobacco: Never   Vaping Use    Vaping status: Never Used   Substance and Sexual Activity    Alcohol use: Never    Drug use: Never       Family Hx:  History reviewed. No pertinent family history.    Review of Imaging (Interpretation of images not reports): XR CHEST 1 VW-     HISTORY: [facility] Protocol     COMPARISON: 5/15/2024.     FINDINGS: The lung apices are obscured by patient head positioning,  particularly the right lung apex. There is a left-sided vagal nerve  stimulator. There is elevation of the left hemidiaphragm. There is  decreased inspiratory effort. No acute consolidation within the  visualized lung fields. The heart is normal in size. The mediastinum is  unremarkable. There is no pneumothorax.  There are no acute osseous  abnormalities.     IMPRESSION:  No acute infiltrate    CT Head Without Contrast  Result Date: 3/14/2025  PROCEDURE: CT  HEAD WO CONTRAST-  HISTORY: eval for ams  COMPARISON: None.  TECHNIQUE: Multiple axial CT images were performed from the foramen magnum to the vertex. Individualized dose reduction techniques using automated exposure control or adjustment of mA and/or kV according to the patient size were employed.  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 The ventricles are enlarged. There is no evidence of edema or hemorrhage.  No masses are identified. No extra-axial fluid is seen. The paranasal sinuses demonstrate mucoperiosteal thickening right frontal, bilateral ethmoid and right maxillary sinuses. No air-fluid level is seen. Remaining paranasal sinuses and mastoids are clear.      Impression: Stable chronic change.  Sinus disease as described.   CTDI: 68.52 mGy DLP:1439.86 mGy.cm  This report was signed and finalized on 3/14/2025 1:47 PM by Donna Bran MD.             Additional Tests Performed:    Latest Reference Range & Units 03/14/25 22:51   Color, UA Yellow, Straw  Yellow   Appearance, UA Clear  Clear   Specific Gravity, UA 1.005 - 1.030  1.024   pH, UA 5.0 - 8.0  6.5   Glucose Negative  Negative   Ketones, UA Negative  Trace !   Blood, UA Negative  Small (1+) !   Nitrite, UA Negative  Negative   Leukocytes, UA Negative  Negative   Protein, UA Negative  Negative   Bilirubin, UA Negative  Negative   Urobilinogen, UA 0.2 - 1.0 E.U./dL  1.0 E.U./dL   RBC, UA None Seen, 0-2 /HPF 3-5 !   WBC, UA None Seen, 0-2 /HPF None Seen   Bacteria, UA None Seen /HPF None Seen   Squamous Epithelial Cells, UA None Seen, 0-2 /HPF None Seen   Hyaline Casts, UA None Seen /LPF None Seen   Methodology:  Manual Light Microscopy   !: Data is abnormal    Results for orders placed during the hospital encounter of 08/15/22    Adult Transthoracic Echo Complete W/ Cont if Necessary Per Protocol    Interpretation Summary  Adequate technical quality.    1.  LV size, function, wall motion, and  wall thickness are normal.  Visually estimated ejection fraction is 55 to 60%.  3D ejection fraction 62%.  Normal LV diastolic function and filling pressures.  The atria and right ventricle are normal.  No septal defect or intracavitary mass or thrombus.    2.  Valves are morphologically normal with no stenotic lesions or valve associated masses or thrombi.  There is trivial MR, trivial AI, and trivial to mild TR.    3.  No pericardial or great vessel pathology.    4.  Pulmonary artery systolic pressures are estimated at approximately 30 mmHg.          Laboratory Results:   Lab Results   Component Value Date    GLUCOSE 68 03/15/2025    CALCIUM 7.9 (L) 03/15/2025     03/15/2025    K 4.7 03/15/2025    CO2 28.2 03/15/2025     03/15/2025    BUN 11 03/15/2025    CREATININE 0.64 03/15/2025    EGFRIFAFRI >150 01/08/2022    BCR 17.2 03/15/2025    ANIONGAP 5.8 03/15/2025     Lab Results   Component Value Date    WBC 4.14 03/15/2025    HGB 9.7 (L) 03/15/2025    HCT 29.2 (L) 03/15/2025    MCV 99.7 (H) 03/15/2025    PLT 66 (L) 03/15/2025     Lab Results   Component Value Date    CHOL 187 05/15/2024     Lab Results   Component Value Date    HDL 92 (H) 05/15/2024     Lab Results   Component Value Date    LDL 84 05/15/2024     Lab Results   Component Value Date    TRIG 60 05/15/2024     Lab Results   Component Value Date    HGBA1C 4.80 05/15/2024     Lab Results   Component Value Date    INR 1.0 03/02/2022    INR 1.9 (H) 01/12/2015    PROTIME 10.8 03/02/2022    PROTIME 20.9 (H) 01/12/2015     Lab Results   Component Value Date    BSAEKPHW80 1,197 (H) 03/14/2025     Lab Results   Component Value Date    FOLATE 18.20 03/14/2025     TSH          5/15/2024    12:00 3/14/2025    12:55 3/14/2025    16:12   TSH   TSH 1.150  2.930  3.270           Physical Examination:  BP 90/62 (BP Location: Right arm, Patient Position: Lying)   Pulse 70   Temp 97.5 °F (36.4 °C) (Axillary)   Resp 16   Wt 48.6 kg (107 lb 2.3 oz)   LMP  03/07/2025   SpO2 95%   BMI 24.03 kg/m²   General Appearance:   Well developed, well nourished, well groomed, drowsy at this time  HEENT: Normocephalic.    Neck and Spine: Normal range of motion.  Normal alignment. No mass or tenderness. No bruits.    Extremities:    No edema or deformities. Normal joint ROM.   Skin:    No rashes or birth marks.    Neurological examination:  Higher Integrative  Function: Very drowsy and not very interactive.  Opens her eyes to voice commands but does not verbalize.  The nurse has noted that she ate her breakfast without any issues..  CN II:  Pupils are equal, round, and reactive to light. Normal visual acuity and visual fields.    CN III IV VI: Extraocular movements are full without nystagmus.   CN V:  Normal facial sensation and strength of muscles of mastication.  CN VII:  Facial movements are symmetric. No weakness.  CN VIII: Auditory acuity is normal.  CN IX & X: Symmetric palatal movement.  CN XI:  Sternocleidomastoid and trapezius are normal.  No weakness.  CN XII:  The tongue is midline.  No atrophy or fasciculations.  Motor:  Normal muscle strength, bulk and tone in upper and lower extremities.  No fasciculations, rigidity, spasticity, or abnormal movements.  Sensation: Normal to light touch, pinprick, vibration, temperature, and proprioception in arms and legs. Normal graphesthesia and no extinction on DSS.  Station and Gait: She is in an upright position resting without any issues.    Coordination:  Could not be tested as she is extremely drowsy    Diagnoses / Discussion:  29 y.o. who presents with Sx of behavioral issues.  The Depakote level checked was a random 1 yesterday and so it looks as if it is extremely elevated 150.  Today it is 63.    Plan:  As she is so drowsy discussed about starting her on Depakote sprinkles 125 mg 4 times daily which will give her a total of 1000 mg in 24 hours as she is taking the extended release Depakote 500 mg twice daily with  food.  To check a trough valproic acid level for tomorrow.  Discontinued the Keppra which will cause more behavioral issues in this kind of person.  To use the Clobazam only at nighttime and not in the daytime as it is causing too much drowsiness.  Keep her hydrated.  Encourage oral intake is much as possible.  Will reevaluate tomorrow to see if she is more cooperative and if she is back to baseline she can be discharged home.  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     I have discussed the above with the team and she will be followed up tomorrow by our inpatient teleneurology service..  Time spent with patient: 70 minutes in face-to-face evaluation and management of the patient using the dedicated telemedicine device without any interruption with the help of the rounding nurse with the patient located at the [facility] and myself at a remote location.    Electronically signed by Tyler Jackson MD, 03/15/25, 12:09 PM EDT.    Dictated using Dragon dictation.

## 2025-03-15 NOTE — THERAPY EVALUATION
PT evaluation held this date due to pt being drowsy and being unable to wake up enough to participate in PT evaluation. Pt would open one eye to look at PT at then look the other way during multiple attempts to complete evaluation. PT to follow up as appropriate.

## 2025-03-15 NOTE — PHARMACY RECOMMENDATION
Pharmacy Recommendation - Enoxaparin  Ave Correia is a 29 y.o. female who has been receiving enoxaparin for VTE prophylaxis.     Allergies  Versed [midazolam]    Relevant clinical data and objective history reviewed:   [Ht:  ; Wt: 48.6 kg (107 lb 2.3 oz)]  Body mass index is 24.03 kg/m².  Estimated Creatinine Clearance: 99.5 mL/min (by C-G formula based on SCr of 0.64 mg/dL).  Results from last 7 days   Lab Units 03/15/25  0606 03/15/25  0605 03/14/25  1255   HEMOGLOBIN g/dL  --  9.7* 11.7*   HEMATOCRIT %  --  29.2* 34.0   PLATELETS 10*3/mm3  --  66* 81*   CREATININE mg/dL 0.64  --  0.68       Asessment/Plan  Recommended to discontinue enoxaparin and place on non-heparin(oid) alternatives.      Thank you.    Simeon Cook [facility], Pharm.D.  03/15/25  15:01 EDT

## 2025-03-16 LAB
ANION GAP SERPL CALCULATED.3IONS-SCNC: 9.8 MMOL/L (ref 5–15)
BUN SERPL-MCNC: 9 MG/DL (ref 6–20)
BUN/CREAT SERPL: 17.3 (ref 7–25)
CALCIUM SPEC-SCNC: 8.3 MG/DL (ref 8.6–10.5)
CHLORIDE SERPL-SCNC: 104 MMOL/L (ref 98–107)
CO2 SERPL-SCNC: 24.2 MMOL/L (ref 22–29)
CREAT SERPL-MCNC: 0.52 MG/DL (ref 0.57–1)
DEPRECATED RDW RBC AUTO: 47.5 FL (ref 37–54)
EGFRCR SERPLBLD CKD-EPI 2021: 129.2 ML/MIN/1.73
ERYTHROCYTE [DISTWIDTH] IN BLOOD BY AUTOMATED COUNT: 13.2 % (ref 12.3–15.4)
GLUCOSE SERPL-MCNC: 77 MG/DL (ref 65–99)
HCT VFR BLD AUTO: 31.1 % (ref 34–46.6)
HGB BLD-MCNC: 10.6 G/DL (ref 12–15.9)
MCH RBC QN AUTO: 33.3 PG (ref 26.6–33)
MCHC RBC AUTO-ENTMCNC: 34.1 G/DL (ref 31.5–35.7)
MCV RBC AUTO: 97.8 FL (ref 79–97)
PLATELET # BLD AUTO: 60 10*3/MM3 (ref 140–450)
PMV BLD AUTO: 11.1 FL (ref 6–12)
POTASSIUM SERPL-SCNC: 4 MMOL/L (ref 3.5–5.2)
RBC # BLD AUTO: 3.18 10*6/MM3 (ref 3.77–5.28)
SODIUM SERPL-SCNC: 138 MMOL/L (ref 136–145)
VALPROATE SERPL-MCNC: 34.9 MCG/ML (ref 50–125)
WBC NRBC COR # BLD AUTO: 3.46 10*3/MM3 (ref 3.4–10.8)

## 2025-03-16 PROCEDURE — 80164 ASSAY DIPROPYLACETIC ACD TOT: CPT | Performed by: PSYCHIATRY & NEUROLOGY

## 2025-03-16 PROCEDURE — 25010000002 ZIPRASIDONE MESYLATE PER 10 MG: Performed by: PSYCHIATRY & NEUROLOGY

## 2025-03-16 PROCEDURE — 99232 SBSQ HOSP IP/OBS MODERATE 35: CPT | Performed by: INTERNAL MEDICINE

## 2025-03-16 PROCEDURE — 85027 COMPLETE CBC AUTOMATED: CPT | Performed by: INTERNAL MEDICINE

## 2025-03-16 PROCEDURE — 80048 BASIC METABOLIC PNL TOTAL CA: CPT | Performed by: INTERNAL MEDICINE

## 2025-03-16 PROCEDURE — 25810000003 SODIUM CHLORIDE 0.9 % SOLUTION: Performed by: STUDENT IN AN ORGANIZED HEALTH CARE EDUCATION/TRAINING PROGRAM

## 2025-03-16 PROCEDURE — 25010000002 LORAZEPAM PER 2 MG: Performed by: FAMILY MEDICINE

## 2025-03-16 PROCEDURE — 99232 SBSQ HOSP IP/OBS MODERATE 35: CPT | Performed by: PSYCHIATRY & NEUROLOGY

## 2025-03-16 RX ADMIN — SODIUM CHLORIDE 75 ML/HR: 9 INJECTION, SOLUTION INTRAVENOUS at 12:02

## 2025-03-16 RX ADMIN — Medication 10 ML: at 09:48

## 2025-03-16 RX ADMIN — ZIPRASIDONE MESYLATE 20 MG: 20 INJECTION, POWDER, LYOPHILIZED, FOR SOLUTION INTRAMUSCULAR at 23:01

## 2025-03-16 RX ADMIN — DIVALPROEX SODIUM 125 MG: 125 CAPSULE ORAL at 09:32

## 2025-03-16 RX ADMIN — LORAZEPAM 1 MG: 2 INJECTION INTRAMUSCULAR; INTRAVENOUS at 02:04

## 2025-03-16 RX ADMIN — CLOBAZAM 20 MG: 10 TABLET ORAL at 09:47

## 2025-03-16 RX ADMIN — Medication 10 ML: at 20:27

## 2025-03-16 RX ADMIN — LORAZEPAM 1 MG: 2 INJECTION INTRAMUSCULAR; INTRAVENOUS at 20:49

## 2025-03-16 RX ADMIN — LEVOTHYROXINE SODIUM 75 MCG: 0.07 TABLET ORAL at 09:32

## 2025-03-16 RX ADMIN — DIVALPROEX SODIUM 125 MG: 125 CAPSULE ORAL at 15:33

## 2025-03-16 RX ADMIN — ARIPIPRAZOLE 10 MG: 5 TABLET ORAL at 09:32

## 2025-03-16 RX ADMIN — PANTOPRAZOLE SODIUM 40 MG: 40 TABLET, DELAYED RELEASE ORAL at 09:31

## 2025-03-16 RX ADMIN — ZIPRASIDONE MESYLATE 20 MG: 20 INJECTION, POWDER, LYOPHILIZED, FOR SOLUTION INTRAMUSCULAR at 03:39

## 2025-03-16 NOTE — PLAN OF CARE
Problem: Adult Inpatient Plan of Care  Goal: Plan of Care Review  Outcome: Progressing  Flowsheets (Taken 3/16/2025 1422)  Progress: improving  Plan of Care Reviewed With: patient  Goal: Patient-Specific Goal (Individualized)  Outcome: Progressing  Goal: Absence of Hospital-Acquired Illness or Injury  Outcome: Progressing  Intervention: Identify and Manage Fall Risk  Recent Flowsheet Documentation  Taken 3/16/2025 1400 by Christal Salcedo RN  Safety Promotion/Fall Prevention:   safety round/check completed   nonskid shoes/slippers when out of bed   fall prevention program maintained   assistive device/personal items within reach  Taken 3/16/2025 1200 by Christal Salcedo RN  Safety Promotion/Fall Prevention:   safety round/check completed   nonskid shoes/slippers when out of bed   fall prevention program maintained   assistive device/personal items within reach  Taken 3/16/2025 1000 by Christal Salcedo RN  Safety Promotion/Fall Prevention:   safety round/check completed   muscle strengthening facilitated   fall prevention program maintained   assistive device/personal items within reach  Taken 3/16/2025 0800 by Christal Salcedo RN  Safety Promotion/Fall Prevention:   safety round/check completed   nonskid shoes/slippers when out of bed   fall prevention program maintained   assistive device/personal items within reach  Intervention: Prevent Skin Injury  Recent Flowsheet Documentation  Taken 3/16/2025 1400 by Christal Salcedo RN  Body Position:   tilted   left  Taken 3/16/2025 1200 by Christal Salcedo RN  Body Position: sitting up in bed  Taken 3/16/2025 1000 by Christal Salcedo RN  Body Position:   tilted   right  Taken 3/16/2025 0800 by Christal Salcedo RN  Body Position: sitting up in bed  Skin Protection: incontinence pads utilized  Intervention: Prevent and Manage VTE (Venous Thromboembolism) Risk  Recent Flowsheet Documentation  Taken 3/16/2025 0800 by Christal Salcedo RN  VTE Prevention/Management:  (see mar) other (see comments)  Goal: Optimal Comfort and Wellbeing  Outcome: Progressing  Intervention: Provide Person-Centered Care  Recent Flowsheet Documentation  Taken 3/16/2025 0800 by Christal Salcedo RN  Trust Relationship/Rapport: care explained  Goal: Readiness for Transition of Care  Outcome: Progressing     Problem: Violence Risk or Actual  Goal: Anger and Impulse Control  Outcome: Progressing  Intervention: Minimize Safety Risk  Recent Flowsheet Documentation  Taken 3/16/2025 1400 by Christal Salcedo RN  Enhanced Safety Measures: bed alarm set  Taken 3/16/2025 1200 by Christal Salcedo RN  Enhanced Safety Measures: bed alarm set  Taken 3/16/2025 1000 by Christal Salcedo RN  Enhanced Safety Measures: bed alarm set  Taken 3/16/2025 0800 by Christal Salcedo RN  Sensory Stimulation Regulation: care clustered  Enhanced Safety Measures: bed alarm set     Problem: Skin Injury Risk Increased  Goal: Skin Health and Integrity  Outcome: Progressing  Intervention: Optimize Skin Protection  Recent Flowsheet Documentation  Taken 3/16/2025 1400 by Christal Salcedo RN  Activity Management: activity encouraged  Head of Bed (HOB) Positioning: HOB elevated  Taken 3/16/2025 1200 by Christal Salcedo RN  Activity Management: activity encouraged  Head of Bed (HOB) Positioning: HOB elevated  Taken 3/16/2025 1000 by Christal Salcedo RN  Activity Management: activity encouraged  Head of Bed (HOB) Positioning: HOB elevated  Taken 3/16/2025 0800 by Christal Salcedo RN  Activity Management: activity encouraged  Pressure Reduction Techniques: frequent weight shift encouraged  Head of Bed (HOB) Positioning: HOB elevated  Pressure Reduction Devices: positioning supports utilized  Skin Protection: incontinence pads utilized     Problem: Comorbidity Management  Goal: Maintenance of Asthma Control  Outcome: Progressing  Goal: Maintenance of Behavioral Health Symptom Control  Outcome: Progressing  Goal: Maintenance of COPD Symptom  Control  Outcome: Progressing  Goal: Blood Glucose Level Within Target Range  Outcome: Progressing  Goal: Maintenance of Heart Failure Symptom Control  Outcome: Progressing  Goal: Blood Pressure in Desired Range  Outcome: Progressing  Goal: Maintenance of Osteoarthritis Symptom Control  Outcome: Progressing  Intervention: Maintain Osteoarthritis Symptom Control  Recent Flowsheet Documentation  Taken 3/16/2025 1400 by Christal Salcedo, RN  Activity Management: activity encouraged  Taken 3/16/2025 1200 by Christal Salcedo, RN  Activity Management: activity encouraged  Taken 3/16/2025 1000 by Christal Salcedo RN  Activity Management: activity encouraged  Taken 3/16/2025 0800 by Christal Salcedo RN  Activity Management: activity encouraged  Goal: Bariatric Home Regimen Maintained  Outcome: Progressing  Goal: Maintenance of Seizure Control  Outcome: Progressing  Intervention: Maintain Seizure Symptom Control  Recent Flowsheet Documentation  Taken 3/16/2025 0800 by Christal Salcedo RN  Sensory Stimulation Regulation: care clustered     Problem: Fall Injury Risk  Goal: Absence of Fall and Fall-Related Injury  Outcome: Progressing  Intervention: Promote Injury-Free Environment  Recent Flowsheet Documentation  Taken 3/16/2025 1400 by Christal Salcedo, RN  Safety Promotion/Fall Prevention:   safety round/check completed   nonskid shoes/slippers when out of bed   fall prevention program maintained   assistive device/personal items within reach  Taken 3/16/2025 1200 by Christal Salcedo RN  Safety Promotion/Fall Prevention:   safety round/check completed   nonskid shoes/slippers when out of bed   fall prevention program maintained   assistive device/personal items within reach  Taken 3/16/2025 1000 by Chrsital Salcedo, RN  Safety Promotion/Fall Prevention:   safety round/check completed   muscle strengthening facilitated   fall prevention program maintained   assistive device/personal items within reach  Taken 3/16/2025 0800  by Christal Salcedo, RN  Safety Promotion/Fall Prevention:   safety round/check completed   nonskid shoes/slippers when out of bed   fall prevention program maintained   assistive device/personal items within reach   Goal Outcome Evaluation:  Plan of Care Reviewed With: patient        Progress: improving

## 2025-03-16 NOTE — PAYOR COMM NOTE
"To:  KYK  From: Opal Funk RN  Phone: [phone]  Fax: [phone]  NPI: [id number]  TIN: [id number]  Member ID: [id number]   MRN: [id number]    Bong Downing (29 y.o. Female)       Date of Birth   1995    Social Security Number       Address   [address] [facility] [address]    Home Phone   [phone]    MRN   [id number]       Episcopal   None    Marital Status   Single                            Admission Date   3/14/2025    Admission Type   Emergency    Admitting Provider   Germain Hoyos MD    Attending Provider   Tate Ojeda MD    Department, Room/Bed   [facility] TELEMETRY 3, 310/1       Discharge Date       Discharge Disposition       Discharge Destination                                 Attending Provider: Tate Ojeda MD    Allergies: Versed [Midazolam]    Isolation: None   Infection: None   Code Status: CPR    Ht: 142.2 cm (55.98\")   Wt: 52.1 kg (114 lb 13.8 oz)    Admission Cmt: None   Principal Problem: Altered mental status [R41.82]                   Active Insurance as of 3/14/2025       Primary Coverage       Payor Plan Insurance Group Employer/Plan Group    MEDICARE MEDICARE A & B        Payor Plan Address Payor Plan Phone Number Payor Plan Fax Number Effective Dates    [address] [phone]  4/1/2018 - None Entered    [facility] [address]         Subscriber Name Subscriber Birth Date Member ID       BONG DOWNING 1995 [id number]               Secondary Coverage       Payor Plan Insurance Group Employer/Plan Group    [place]        Payor Plan Address Payor Plan Phone Number Payor Plan Fax Number Effective Dates    [address] [phone]  1/8/2022 - None Entered    [place] [address]         Subscriber Name Subscriber Birth Date Member ID       BONG DOWNING 1995 [id number]                     Emergency Contacts        (Rel.) Home Phone Work Phone Mobile Phone    ARLET,APRIL (Legal Guardian) [phone] -- " "[phone]    leonard jennings (Care Giver) [phone] -- --    GELY LERMA (Care Giver) -- -- [phone]    MARYURI MCGINNIS (Care Giver) -- -- [phone]    Caroline Lerma (Care Giver) -- -- [phone]                 History & Physical        Germain Hoyos MD at [facility]              [facility]    Patient Identification:  Name:  Ave Correia  Age:  29 y.o.  Sex:  female  :  1995  MRN:  [id number]   Visit Number:  [id number]  Admit Date: 3/14/2025   Room number:    Primary Care Physician:  Reyna Loyd APRN    Date of Admission: 3/14/2025     Subjective     Chief complaint:    Chief Complaint   Patient presents with    Fall     Pt was brought in from the [facility] for a fall.       History of presenting illness:     This is a 28 yo female with a PMH of cerebral palsy, hemiparesis of right dominant side due to non-cerebral vascular etiology, eizures, anxiety and hypothyroidism presenting with altered mental status.  Of note, she is currently staying at a residential program and one of the staff members is at the bedside explaining what has occurred.  It appears that the patient at baseline can have oh full conversation although her speech is a little impaired and sometimes difficult to understand.  She she is also able to ambulate despite having some difficulties due to her left hemiparesis.  It appears that around 3 AM this morning, she woke up screaming \"I hate you\", \"I will kill you\" until about 8 AM.  She went back to her baseline until around 12:30 when she fell to the floor.  The patient then became combative, and refused to get up. Staff did notice that her urine had a foul smell and looked dark.  EMS was called and she was found to be hypotensive and brought to our ED.  At this time, the patient is not following commands, and is groaning.     In ED, blood pressure 66/41, heart rate 75, respiratory rate of 16, temperature 97 O2 " saturation 95% on room air.  Labs on arrival showed a sodium 134, potassium 4, BUN 15, creatinine 0.6, WBC 4, hemoglobin 11.7, and platelet count 81.  Patient's valproic acid over 150.  Chest x-ray showed no acute infiltrates.  CT head showed no acute findings.  The patient has been started on IV fluids and her blood pressure has improved to 101/73.    ---------------------------------------------------------------------------------------------------------------------   Review of Systems Unable to assess, patient is altered  ---------------------------------------------------------------------------------------------------------------------   Past Medical History:   Diagnosis Date    Blind right eye     Cerebral palsy     Hemiparesis of right dominant side due to non-cerebrovascular etiology     Seizures      Past Surgical History:   Procedure Laterality Date    DENTAL EXAMINATION UNDER ANESTHESIA W/ CLEANING AND XRAYS      Fillings placed.    IMPLANTATION VAGAL NERVE STIMULATOR       History reviewed. No pertinent family history.  Social History     Socioeconomic History    Marital status: Single   Tobacco Use    Smoking status: Never    Smokeless tobacco: Never   Vaping Use    Vaping status: Never Used   Substance and Sexual Activity    Alcohol use: Never    Drug use: Never     ---------------------------------------------------------------------------------------------------------------------   Allergies:  Versed [midazolam]  ---------------------------------------------------------------------------------------------------------------------   Medications below are reported home medications pulling from within the system; at this time, these medications have not been reconciled unless otherwise specified and are in the verification process for further verifcation as current home medications.      Prior to Admission Medications       Prescriptions Last Dose Informant Patient Reported? Taking?    ARIPiprazole  (ABILIFY) 10 MG tablet   No No    Take 1 tablet by mouth Daily.    bisacodyl (DULCOLAX) 5 MG EC tablet   Yes No    Take 5 mg by mouth Daily As Needed for Constipation.    citalopram (CeleXA) 20 MG tablet   No No    Take 1 tablet by mouth Daily.    cloBAZam (ONFI) 10 MG tablet   Yes No    Take 2 tablets by mouth.    cloBAZam (ONFI) 20 MG tablet   Yes No    35 mg.    divalproex (DEPAKOTE) 500 MG DR tablet   Yes No    Take 2 tablets by mouth 2 (Two) Times a Day.    FeroSul 325 (65 Fe) MG tablet   Yes No    Take 1 tablet by mouth Daily.    furosemide (LASIX) 20 MG tablet   Yes No    Take 1 tablet by mouth Daily.    hydrOXYzine (ATARAX) 50 MG tablet   No No    Take 1 tablet by mouth 3 (Three) Times a Day.    lamoTRIgine (LaMICtal) 25 MG tablet   Yes No    TAKE ONE TABLET BY MOUTH EVERY OTHER DAY FOR 2 WEEKS (STARTS 1/24/25)    Levonorgest-Eth Estrad 91-Day (Jaimiess) 0.15-0.03 &0.01 MG   Yes No    Take 1 tablet by mouth Daily.    levothyroxine (SYNTHROID, LEVOTHROID) 75 MCG tablet   Yes No    Take 1 tablet by mouth Daily.    multivitamin with minerals tablet tablet   Yes No    Take 1 tablet by mouth Daily.    polyethylene glycol (MIRALAX) 17 GM/SCOOP powder   Yes No    Take 17 g by mouth Daily.    potassium chloride 10 MEQ CR tablet   Yes No    Take 1 tablet by mouth Daily.    QUEtiapine (SEROquel) 100 MG tablet   No No    Take 1 tablet by mouth Every Night.          Objective     Vital Signs:  Temp:  [97 °F (36.1 °C)] 97 °F (36.1 °C)  Heart Rate:  [64-75] 68  Resp:  [16] 16  BP: (66-89)/(41-66) 89/66    Mean Arterial Pressure (Non-Invasive) for the past 24 hrs (Last 3 readings):   Noninvasive MAP (mmHg)   03/14/25 1502 80   03/14/25 1452 64   03/14/25 1442 54     SpO2:  [94 %-100 %] 100 %  on   ;   Device (Oxygen Therapy): room air  Body mass index is 22.7 kg/m².    Wt Readings from Last 3 Encounters:   03/14/25 45.9 kg (101 lb 3.1 oz)   01/27/25 45.9 kg (101 lb 3.2 oz)   12/17/24 45.2 kg (99 lb 9.6 oz)       ----------------------------------------------------------------------------------------------------------------------  PHYSICAL EXAMINATION:  GENERAL: The patient is well developed and nontoxic.  HEENT: Anicteric sclerae, PERRLA, EOMI. Oropharynx clear. Moist mucous membranes. Conjunctivae appear well perfused.  CHEST: Chest wall is nontender.  HEART: Regular rate and rhythm without murmurs.  LUNGS: Clear to auscultation bilaterally.  ABDOMEN: Soft, positive bowel sounds, nontender, no organomegaly.  RECTAL: Deferred.  SKIN: No rash, no excessive bruising, petechiae, or purpura.  NEUROLOGIC: The patient is groaning and not following commands. Her left hand is contracted and against her chest. She is moving her right lower extremity and there is some small movement seen on left lower extremity.     ---------------------------------------------------------------------------------------------------------------------  --------------------------------------------------------------------------------------------------------------------  LABS:    CBC and coagulation:  Results from last 7 days   Lab Units 03/14/25  1255   WBC 10*3/mm3 4.79   HEMOGLOBIN g/dL 11.7*   HEMATOCRIT % 34.0   MCV fL 97.1*   MCHC g/dL 34.4   PLATELETS 10*3/mm3 81*     Acid/base balance:      Renal and electrolytes:  Results from last 7 days   Lab Units 03/14/25  1255   SODIUM mmol/L 134*   POTASSIUM mmol/L 4.0   CHLORIDE mmol/L 98   CO2 mmol/L 26.8   BUN mg/dL 15   CREATININE mg/dL 0.68   CALCIUM mg/dL 8.9   GLUCOSE mg/dL 112*     Estimated Creatinine Clearance: 88.5 mL/min (by C-G formula based on SCr of 0.68 mg/dL).    Liver and pancreatic function:  Results from last 7 days   Lab Units 03/14/25  1255   ALBUMIN g/dL 3.9   BILIRUBIN mg/dL <0.2   ALK PHOS U/L 51   AST (SGOT) U/L 28   ALT (SGPT) U/L 16     Endocrine function:  Lab Results   Component Value Date    HGBA1C 4.80 05/15/2024     Point of care bedside glucose levels:      Lab Results    Component Value Date    TSH 2.930 03/14/2025    FREET4 1.5 04/01/2024     Cardiac:  Results from last 7 days   Lab Units 03/14/25  1255   HSTROP T ng/L 20*       Cultures:  Lab Results   Component Value Date    COLORU Dark Yellow (A) 04/17/2024    CLARITYU Clear 04/17/2024    PHUR 5.5 04/17/2024    PROTEINUR Negative 04/01/2024    GLUCOSEU Negative 04/17/2024    KETONESU Trace (A) 04/17/2024    BLOODU Negative 04/17/2024    NITRITEU Negative 04/17/2024    LEUKOCYTESUR Moderate (2+) (A) 04/17/2024    BILIRUBINUR Negative 04/17/2024    UROBILINOGEN 1.0 E.U./dL 04/17/2024    RBCUA None Seen 04/17/2024    WBCUA 6-10 (A) 04/17/2024    BACTERIA Trace (A) 04/17/2024     Microbiology Results (last 10 days)       ** No results found for the last 240 hours. **            Lab Results   Component Value Date    PREGTESTUR Negative 04/17/2024     Pain Management Panel           No data to display                I have personally looked at the labs and they are summarized above.  ----------------------------------------------------------------------------------------------------------------------  Detailed radiology reports for the last 24 hours:    Imaging Results (Last 24 Hours)       Procedure Component Value Units Date/Time    CT Head Without Contrast [[id number]] Collected: 03/14/25 1344     Updated: 03/14/25 1349    Narrative:      PROCEDURE: CT HEAD WO CONTRAST-     HISTORY: eval for ams     COMPARISON: None.     TECHNIQUE: Multiple axial CT images were performed from the foramen  magnum to the vertex. Individualized dose reduction techniques using  automated exposure control or adjustment of mA and/or kV according to  the patient size were employed.     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 The ventricles are enlarged. There is  no evidence of edema or hemorrhage.  No masses are identified. No  extra-axial fluid is seen. The paranasal sinuses  demonstrate  mucoperiosteal thickening right frontal, bilateral ethmoid and right  maxillary sinuses. No air-fluid level is seen. Remaining paranasal  sinuses and mastoids are clear.       Impression:      Stable chronic change.     Sinus disease as described.        CTDI: 68.52 mGy  DLP:1439.86 mGy.cm     This report was signed and finalized on 3/14/2025 1:47 PM by Donna Bran MD.       XR Chest 1 View [[id number]] Collected: 03/14/25 1332     Updated: 03/14/25 1345    Narrative:      PROCEDURE: XR CHEST 1 VW-     HISTORY: [facility] Protocol     COMPARISON: 5/15/2024.     FINDINGS: The lung apices are obscured by patient head positioning,  particularly the right lung apex. There is a left-sided vagal nerve  stimulator. There is elevation of the left hemidiaphragm. There is  decreased inspiratory effort. No acute consolidation within the  visualized lung fields. The heart is normal in size. The mediastinum is  unremarkable. There is no pneumothorax.  There are no acute osseous  abnormalities.       Impression:      No acute infiltrate                    Images were reviewed, interpreted, and dictated by Dr. Donna Bran MD  Transcribed by Laureano Woods PA-C.     This report was signed and finalized on 3/14/2025 1:43 PM by Donna Bran MD.             Final impressions for the last 30 days of radiology reports:    CT Head Without Contrast  Result Date: 3/14/2025  Stable chronic change.  Sinus disease as described.   CTDI: 68.52 mGy DLP:1439.86 mGy.cm  This report was signed and finalized on 3/14/2025 1:47 PM by Donna Bran MD.      XR Chest 1 View  Result Date: 3/14/2025  No acute infiltrate       Images were reviewed, interpreted, and dictated by Dr. Donna Bran MD Transcribed by Laureano Woods PA-C.  This report was signed and finalized on 3/14/2025 1:43 PM by Donna Bran MD.          Assessment & Plan      This is a 28 yo female with a PMH of cerebral palsy, hemiparesis of right dominant side due to non-cerebral  vascular etiology, eizures, anxiety and hypothyroidism presenting with altered mental status.     Assessment:  Hypotension, now improving  Valproic acid toxicity  Altered mental status  Possible UTI  Cerebral palsy  Hemiparesis  History of seizures  Anxiety  Hypothyroidism    Plan:  - Admit to observation  - CT head with no acute findings  - Follow-up TSH, B12, urine drug screen and folate  - Given her elevated valproic acid levels, follow-up on ammonia levels as well  - Start IV fluids  - If no improvement in blood pressure with IV fluids, will start pressors  - Consult neurology  - Will hold devalproex and lamotrigine   - Given her mentation, will also hold aripiprazole, citalopram  - Seizure precautions   - F/u urinalysis   - Will hold off on starting any antibiotics at this time  - Monitor on telemetry        Germain Hoyos MD  [facility]  03/14/25  16:30 EDT      Electronically signed by Germain Hoyos MD at 03/14/25 1700          Emergency Department Notes        Dominick Trinh DO at 03/14/25 1640                 [facility] EMERGENCY DEPARTMENT  Emergency Department Encounter  Emergency Medicine Physician Note     Pt Name:Ave Correia  MRN: [id number]  Birthdate 1995  Date of evaluation: 3/14/2025  PCP:  Reyna Loyd APRN  Note Started: 4:40 PM EDT      CHIEF COMPLAINT       Chief Complaint   Patient presents with    Fall     Pt was brought in from the [facility] for a fall.       HISTORY OF PRESENT ILLNESS  (Location/Symptom, Timing/Onset, Context/Setting, Quality, Duration, Modifying Factors, Severity.)      Ave Correia is a 29 y.o. female who presents with for a fall.  Patient was also noted to be aggressive earlier this morning per staff at her [facility].  Patient has a history of seizures and cerebral palsy.  EMS states that she was talking to them initially on arrival and then became unresponsive.  Care at the nursing facility states that  she was aggressive earlier this morning, they state that they did not give her any additional medications to try to help.  Patient normally has lower normal blood pressure of 90 systolic over 60-70 diastolic.  Patient has been receiving medications as prescribed.    PAST MEDICAL / SURGICAL / SOCIAL / FAMILY HISTORY     Past Medical History:   Diagnosis Date    Blind right eye     Cerebral palsy     Hemiparesis of right dominant side due to non-cerebrovascular etiology     Seizures      No additional pertinent       Past Surgical History:   Procedure Laterality Date    DENTAL EXAMINATION UNDER ANESTHESIA W/ CLEANING AND XRAYS      Fillings placed.    IMPLANTATION VAGAL NERVE STIMULATOR       No additional pertinent       Social History     Socioeconomic History    Marital status: Single   Tobacco Use    Smoking status: Never    Smokeless tobacco: Never   Vaping Use    Vaping status: Never Used   Substance and Sexual Activity    Alcohol use: Never    Drug use: Never       History reviewed. No pertinent family history.    Allergies:  Versed [midazolam]    Home Medications:  Prior to Admission medications    Medication Sig Start Date End Date Taking? Authorizing Provider   ARIPiprazole (ABILIFY) 10 MG tablet Take 1 tablet by mouth Daily. 1/27/25   Teressa Abad APRN   bisacodyl (DULCOLAX) 5 MG EC tablet Take 5 mg by mouth Daily As Needed for Constipation.    ProviderKelly MD   citalopram (CeleXA) 20 MG tablet Take 1 tablet by mouth Daily. 1/27/25   Teressa Abad APRN   cloBAZam (ONFI) 10 MG tablet Take 2 tablets by mouth. 3/29/23   Kelly Bell MD   cloBAZam (ONFI) 20 MG tablet 35 mg.    Kelly Bell MD   divalproex (DEPAKOTE) 500 MG DR tablet Take 2 tablets by mouth 2 (Two) Times a Day.    Kelly Bell MD   FeroSul 325 (65 Fe) MG tablet Take 1 tablet by mouth Daily. 4/22/22   Kelly Bell MD   furosemide (LASIX) 20 MG tablet Take 1 tablet by mouth Daily. 4/22/22    Kelly Bell MD   hydrOXYzine (ATARAX) 50 MG tablet Take 1 tablet by mouth 3 (Three) Times a Day. 1/27/25   Teressa Abad APRN   lamoTRIgine (LaMICtal) 25 MG tablet TAKE ONE TABLET BY MOUTH EVERY OTHER DAY FOR 2 WEEKS (STARTS 1/24/25) 1/23/25   Kelly Bell MD   Levonorgest-Eth Estrad 91-Day (Jaimiess) 0.15-0.03 &0.01 MG Take 1 tablet by mouth Daily. 5/15/23   Kelly Bell MD   levothyroxine (SYNTHROID, LEVOTHROID) 75 MCG tablet Take 1 tablet by mouth Daily.    Kelly Bell MD   multivitamin with minerals tablet tablet Take 1 tablet by mouth Daily.    Kelly Bell MD   polyethylene glycol (MIRALAX) 17 GM/SCOOP powder Take 17 g by mouth Daily. 4/22/22   Kelly Bell MD   potassium chloride 10 MEQ CR tablet Take 1 tablet by mouth Daily. 4/22/22   Kelly Bell MD   QUEtiapine (SEROquel) 100 MG tablet Take 1 tablet by mouth Every Night. 1/27/25   Teressa Abad APRN         REVIEW OF SYSTEMS       Review of Systems   Unable to perform ROS: Patient unresponsive       PHYSICAL EXAM      INITIAL VITALS:   BP (!) 89/66   Pulse 68   Temp 97 °F (36.1 °C) (Axillary)   Resp 16   Wt 45.9 kg (101 lb 3.1 oz)   LMP 03/07/2025   SpO2 100%   BMI 22.70 kg/m²     Physical Exam  Constitutional:       General: She is not in acute distress.     Appearance: Normal appearance. She is not ill-appearing.   HENT:      Head: Normocephalic and atraumatic.      Mouth/Throat:      Mouth: Mucous membranes are dry.      Pharynx: Oropharynx is clear.   Eyes:      Pupils: Pupils are equal, round, and reactive to light.      Comments: Pupils are noted to be 4 mm bilaterally and sluggish to light   Cardiovascular:      Rate and Rhythm: Normal rate and regular rhythm.      Pulses: Normal pulses.   Pulmonary:      Effort: Pulmonary effort is normal.      Breath sounds: Normal breath sounds.   Abdominal:      General: Abdomen is flat. There is no distension.      Palpations:  Abdomen is soft.      Tenderness: There is no abdominal tenderness. There is no guarding.   Musculoskeletal:      Comments: Patient contracted in the upper extremities, will need follow-up for extremities to pain   Skin:     General: Skin is warm and dry.   Neurological:      Mental Status: She is alert.      GCS: GCS eye subscore is 1. GCS verbal subscore is 3. GCS motor subscore is 4.      Cranial Nerves: No facial asymmetry.      Comments: Patient moves all 4 extremities to pain.           DDX/DIAGNOSTIC RESULTS / EMERGENCY DEPARTMENT COURSE / MDM     Differential Diagnosis included but not limited: Sepsis, urinary tract fracture, intracranial hemorrhage, seizure, elevated ammonia, hypercapnia, psychosomatic cause of patient's altered mental status, dehydration, electrolyte abnormalities, medication induced hypotension and AMS    Diagnoses Considered but Do Not Suspect: Low concern for sepsis after evaluations patient.  No active grand mal seizure on my evaluation.    Decision Rules/Scores utilized: N/A     Tests considered but not ordered and why:  N/A     MIPS: N/A     Code Status Discussion:  Not Discussed    Additional Patient Education Provided: None     Medical Decision Making    Medical Decision Making   29-year-old female with cerebral palsy and seizure history who presented today altered and hypotensive.  Unable to get a urine at this time, multiple straight cath attempted, patient currently on a bedpan.  Patient's blood pressure is improved after 2 L of IV fluid.  Patient was noted to be somewhat verbal with EMS, has not been verbal here.  Patient was noted to have a fall from standing, falls frequently and wears a helmet.  Her CT head noted to be negative, for intracranial hemorrhage or other cause of altered mental status.  Patient was noted to have some sinusitis on CT imaging.  Patient with no leukocytosis, no tachycardia, not meeting any SIRS criteria.  Patient is on valproic acid, ammonia is  pending for valproic acid induced elevated ammonia.  Did not cover patient for sepsis at this time she is meeting no SIRS criteria.  Patient's blood pressure has improved with IV fluids.  Patient has not had a return to baseline.  Patient may be having absence seizure, did discuss this with neurology who feels that this is not the case due to being hypotensive and nontachycardic.  Did request their consultation, they will evaluate patient shortly.  Ammonia and lactate are pending.  Patient did have some mildly elevated troponin, repeat troponin is pending.  Patient has no abdominal pain on palpation.  Pupils were noted to be quite dilated bilaterally and sluggish.  Patient is on multiple seizure medications that could cause altered mentation and neurology feels that this also may be the case.  Patient very minimally hypercapnic and do not feel this is causing altered mentation.  Patient with very minimally elevated ammonia, this could be causing patient's altered mental status but not as likely.  Patient admitted to hospitalist group for further evaluation, workup, and further care.    Problems Addressed:  Josep coma scale total score 9-12, at arrival to emergency department: complicated acute illness or injury  Hypotension due to hypovolemia: complicated acute illness or injury    Amount and/or Complexity of Data Reviewed  Independent Historian: caregiver  External Data Reviewed: labs and notes.  Labs: ordered. Decision-making details documented in ED Course.  Radiology: ordered and independent interpretation performed.     Details: Personally evaluated CT head, no signs intracranial hemorrhage or obvious infarct  ECG/medicine tests: ordered.  Discussion of management or test interpretation with external provider(s): Neurology for recommendations, hospitalist for admission    Risk  Prescription drug management.  Decision regarding hospitalization.        See ED COURSE for additional MDM statements    EKG    EKG  Interpretation    Evaluated and interpreted by emergency department physician    Rhythm: Normal Sinus Rhythm  Rate: Normal  Axis: Yaneth  Ectopy: none  Conduction: Normal  ST Segments: Normal  T Waves: Flattening in multiple leads  Q Waves: Small Q wave in lead III, nonspecific T waves in aVR V1    Clinical Impression: Normal Sinus Rhythm    Dominick Trinh DO      All EKG's are interpreted by the Emergency Department Physician who either signs or Co-signs this chart in the absence of a cardiologist.    Additional Scores                   EMERGENCY DEPARTMENT COURSE:    ED Course as of 03/14/25 1707   Fri Mar 14, 2025   1420 WBC: 4.79 [CR]   1512 Discussed patient's case with neurology, they feel that this is not a seizure at this time due to not being tachycardic and hypertensive.  Patient does have a history of absence seizure's, do feel some else may be going on at this time and I do agree with this.  Workup at this time  grossly negative though with normal white blood cell count, no signs of endorgan injury at this time, and no signs of infectious process, CT head and x-ray of the chest are noted to be negative. [CR]   1532 Reevaluated, patient noted to have improving blood pressure after IV fluids.  Plan to check ammonia.  Patient has not been able to provide urine, plan for straight cath for urine evaluation. [CR]   1533 Valproic acid induced hyperammonemia.   [CR]   1543 Patient on multiple antiseizure medications that can lead to somnolence and hypotension.  Further discussion with neurology feels that this may be secondary to medication management. [CR]   1616 Platelets(!): 81  Patient with low platelets, this is 1 low she has had an laboratory evaluation recently [CR]      ED Course User Index  [CR] Dominick Trinh DO       PROCEDURES:  None Performed   Procedures    DATA FOR LAB AND RADIOLOGY TESTS ORDERED BELOW ARE REVIEWED BY THE ED CLINICIAN:    RADIOLOGY: All x-rays, CT, MRI, and  formal ultrasound images (except ED bedside ultrasound) are read by the radiologist, see reports below, unless otherwise noted in MDM or here.  Reports below are reviewed by myself.  CT Head Without Contrast   Final Result   Stable chronic change.       Sinus disease as described.           CTDI: 68.52 mGy   DLP:1439.86 mGy.cm       This report was signed and finalized on 3/14/2025 1:47 PM by Donna Bran MD.          XR Chest 1 View   Final Result   No acute infiltrate                           Images were reviewed, interpreted, and dictated by Dr. Donna Bran MD   Transcribed by Laureano Woods PA-C.       This report was signed and finalized on 3/14/2025 1:43 PM by Donna Bran MD.              LABS: Lab orders shown below, the results are reviewed by myself, and all abnormals are listed below.  Labs Reviewed   COMPREHENSIVE METABOLIC PANEL - Abnormal; Notable for the following components:       Result Value    Glucose 112 (*)     Sodium 134 (*)     All other components within normal limits    Narrative:     GFR Categories in Chronic Kidney Disease (CKD)      GFR Category          GFR (mL/min/1.73)    Interpretation  G1                     90 or greater         Normal or high (1)  G2                      60-89                Mild decrease (1)  G3a                   45-59                Mild to moderate decrease  G3b                   30-44                Moderate to severe decrease  G4                    15-29                Severe decrease  G5                    14 or less           Kidney failure          (1)In the absence of evidence of kidney disease, neither GFR category G1 or G2 fulfill the criteria for CKD.    eGFR calculation 2021 CKD-EPI creatinine equation, which does not include race as a factor   VALPROIC ACID LEVEL, TOTAL - Abnormal; Notable for the following components:    Valproic Acid >150.0 (*)     All other components within normal limits    Narrative:     Therapeutic Ranges for Valproic  Acid    Epilepsy:       mcg/ml  Bipolar/Juany  up to 125 mcg/ml     CBC WITH AUTO DIFFERENTIAL - Abnormal; Notable for the following components:    RBC 3.50 (*)     Hemoglobin 11.7 (*)     MCV 97.1 (*)     MCH 33.4 (*)     Platelets 81 (*)     Lymphocyte % 49.3 (*)     All other components within normal limits   AMMONIA - Abnormal; Notable for the following components:    Ammonia 61 (*)     All other components within normal limits   TROPONIN - Abnormal; Notable for the following components:    HS Troponin T 20 (*)     All other components within normal limits    Narrative:     High Sensitive Troponin T Reference Range:  <14.0 ng/L- Negative Female for AMI  <22.0 ng/L- Negative Male for AMI  >=14 - Abnormal Female indicating possible myocardial injury.  >=22 - Abnormal Male indicating possible myocardial injury.   Clinicians would have to utilize clinical acumen, EKG, Troponin, and serial changes to determine if it is an Acute Myocardial Infarction or myocardial injury due to an underlying chronic condition.        HIGH SENSITIVITIY TROPONIN T 1HR - Abnormal; Notable for the following components:    HS Troponin T 17 (*)     All other components within normal limits    Narrative:     High Sensitive Troponin T Reference Range:  <14.0 ng/L- Negative Female for AMI  <22.0 ng/L- Negative Male for AMI  >=14 - Abnormal Female indicating possible myocardial injury.  >=22 - Abnormal Male indicating possible myocardial injury.   Clinicians would have to utilize clinical acumen, EKG, Troponin, and serial changes to determine if it is an Acute Myocardial Infarction or myocardial injury due to an underlying chronic condition.        BLOOD GAS, VENOUS W/CO-OXIMETRY - Abnormal; Notable for the following components:    pH, Venous 7.302 (*)     pCO2, Venous 56.2 (*)     All other components within normal limits   HCG, SERUM, QUALITATIVE - Normal   TSH RFX ON ABNORMAL TO FREE T4 - Normal   LACTIC ACID, PLASMA - Normal   RAINBOW  DRAW    Narrative:     The following orders were created for panel order [place] Draw.  Procedure                               Abnormality         Status                     ---------                               -----------         ------                     Green Top (Gel)[[id number]]                                  Final result               Lavender Top[[id number]]                                     Final result               Gold Top - SST[[id number]]                                                              Light Blue Top[[id number]]                                   Final result                 Please view results for these tests on the individual orders.   BLOOD GAS, VENOUS   FOLATE   URINE DRUG SCREEN   VITAMIN B12   TSH   URINALYSIS W/ CULTURE IF INDICATED   POCT GLUCOSE FINGERSTICK   CBC AND DIFFERENTIAL    Narrative:     The following orders were created for panel order CBC & Differential.  Procedure                               Abnormality         Status                     ---------                               -----------         ------                     CBC Auto Differential[[id number]]        Abnormal            Final result               Scan Slide[[id number]]                                                                    Please view results for these tests on the individual orders.   GREEN TOP   LAVENDER TOP   LIGHT BLUE TOP       Vitals Reviewed:    Vitals:    03/14/25 1432 03/14/25 1442 03/14/25 1452 03/14/25 1502   BP: (!) 71/48 (!) 68/45 (!) 71/50 (!) 89/66   BP Location:       Patient Position:       Pulse: 66 64 69 68   Resp:       Temp:       TempSrc:       SpO2: 98% 98%  100%   Weight:           MEDICATIONS GIVEN TO PATIENT THIS ENCOUNTER:  Medications   sodium chloride 0.9 % flush 10 mL (has no administration in time range)   sodium chloride 0.9 % infusion (has no administration in time range)   sodium chloride 0.9 % bolus 1,000 mL (0 mL Intravenous Stopped 3/14/25 1329)    lactated ringers bolus 1,000 mL (0 mL Intravenous Stopped 3/14/25 1612)       CONSULTS:  IP CONSULT TO NEUROLOGY    CRITICAL CARE:  There was significant risk of life threatening deterioration of patient's condition requiring my direct management. Critical care time 62 minutes, excluding any documented procedures.    FINAL IMPRESSION      1. Josep coma scale total score 9-12, at arrival to emergency department    2. Hypotension due to hypovolemia          DISPOSITION / PLAN     ED Disposition       ED Disposition   Decision to Admit    Condition   --    Comment   Level of Care: Telemetry [5]   Diagnosis: Altered mental status [780.97.ICD-9-CM]   Admitting Physician: RACHEL PIRES [[id number]]                 PATIENT REFERRED TO:  No follow-up provider specified.    DISCHARGE MEDICATIONS:     Medication List        ASK your doctor about these medications      ARIPiprazole 10 MG tablet  Commonly known as: ABILIFY  Take 1 tablet by mouth Daily.     bisacodyl 5 MG EC tablet  Commonly known as: DULCOLAX     citalopram 20 MG tablet  Commonly known as: CeleXA  Take 1 tablet by mouth Daily.     * cloBAZam 20 MG tablet  Commonly known as: ONFI     * cloBAZam 10 MG tablet  Commonly known as: ONFI     divalproex 500 MG DR tablet  Commonly known as: DEPAKOTE     FeroSul 325 (65 Fe) MG tablet  Generic drug: ferrous sulfate     furosemide 20 MG tablet  Commonly known as: LASIX     hydrOXYzine 50 MG tablet  Commonly known as: ATARAX  Take 1 tablet by mouth 3 (Three) Times a Day.     Jaimiess 0.15-0.03 &0.01 MG  Generic drug: Levonorgest-Eth Estrad 91-Day     lamoTRIgine 25 MG tablet  Commonly known as: LaMICtal     levothyroxine 75 MCG tablet  Commonly known as: SYNTHROID, LEVOTHROID     multivitamin with minerals tablet tablet     polyethylene glycol 17 GM/SCOOP powder  Commonly known as: MIRALAX     potassium chloride 10 MEQ CR tablet     QUEtiapine 100 MG tablet  Commonly known as: SEROquel  Take 1 tablet by mouth Every  Night.           * This list has 2 medication(s) that are the same as other medications prescribed for you. Read the directions carefully, and ask your doctor or other care provider to review them with you.                  Electronically signed by Dominick Trinh DO, 03/14/25, 4:40 PM EDT.    Emergency Medicine Physician  [facility]  (Please note that portions of thisnote were completed with a voice recognition program.  Efforts were made to edit the dictations but occasionally words are mis-transcribed.)       Dominick Trinh DO  03/14/25 1709      Electronically signed by Dominick Trinh DO at 03/14/25 1709       Vital Signs (last day)       Date/Time Temp Temp src Pulse Resp BP Patient Position SpO2    03/16/25 1101 97.4 (36.3) Axillary 70 18 117/86 Lying 97    03/16/25 0722 97.5 (36.4) Oral 56 16 119/83 Lying 98    03/16/25 0300 -- -- 71 -- 116/88 Lying 98    03/15/25 2300 -- -- 54 -- 102/77 Lying 100    03/15/25 1900 97.3 (36.3) Axillary 66 16 105/73 Lying 97    03/15/25 1500 -- -- 87 16 110/95 Lying 90    03/15/25 1300 -- -- 76 -- -- -- --    03/15/25 1259 -- -- 77 -- -- -- --    03/15/25 1100 97.5 (36.4) Axillary 70 16 90/62 Lying 95    03/15/25 0900 -- -- 94 -- 116/69 -- 98    03/15/25 0700 97.2 (36.2) Axillary 69 16 109/89 Lying 95    03/15/25 0400 -- -- -- -- 95/72 -- --    03/15/25 0331 96.6 (35.9) Axillary 70 16 99/74 Lying 100    03/15/25 0200 -- -- -- -- 95/65 -- --    03/15/25 0144 -- -- -- -- 83/68 -- --    03/15/25 0100 -- -- -- -- 89/62 -- --    03/15/25 0015 -- -- -- -- 81/54 -- --          Current Facility-Administered Medications   Medication Dose Route Frequency Provider Last Rate Last Admin    ARIPiprazole (ABILIFY) tablet 10 mg  10 mg Oral Daily Germain Hoyos MD   10 mg at 03/16/25 0932    cloBAZam (ONFI) tablet 20 mg  20 mg Oral BID Natalie Mitchell DO   20 mg at 03/16/25 0947    Divalproex Sodium (DEPAKOTE SPRINKLE) capsule 125 mg   125 mg Oral Q6H Tyler Jackson MD   125 mg at 03/16/25 0932    [Held by provider] hydrOXYzine (ATARAX) tablet 50 mg  50 mg Oral TID Germain Hoyos MD        levothyroxine (SYNTHROID, LEVOTHROID) tablet 75 mcg  75 mcg Oral Daily Germain Hoyos MD   75 mcg at 03/16/25 0932    LORazepam (ATIVAN) injection 1 mg  1 mg Intravenous Q4H PRN Natalie Mitchell DO   1 mg at 03/16/25 0204    nitroglycerin (NITROSTAT) SL tablet 0.4 mg  0.4 mg Sublingual Q5 Min PRN Germain Hoyos MD        pantoprazole (PROTONIX) EC tablet 40 mg  40 mg Oral Daily Germain Hoyos MD   40 mg at 03/16/25 0931    sodium chloride 0.9 % flush 10 mL  10 mL Intravenous PRN Dominick Trinh DO        sodium chloride 0.9 % flush 10 mL  10 mL Intravenous Q12H Germain Hoyos MD   10 mL at 03/16/25 0948    sodium chloride 0.9 % flush 10 mL  10 mL Intravenous PRN Germain Hoyos MD   10 mL at 03/15/25 1342    sodium chloride 0.9 % infusion 40 mL  40 mL Intravenous PRN Germain Hoyos MD        sodium chloride 0.9 % infusion  75 mL/hr Intravenous Continuous Germain Hoyos MD 75 mL/hr at 03/16/25 1202 75 mL/hr at 03/16/25 1202    ziprasidone (GEODON) injection 20 mg  20 mg Intramuscular Q4H PRN Tyler Jackson MD   20 mg at 03/16/25 0339     Lab Results (last 24 hours)       Procedure Component Value Units Date/Time    Basic Metabolic Panel [[id number]]  (Abnormal) Collected: 03/16/25 0534    Specimen: Blood Updated: 03/16/25 0601     Glucose 77 mg/dL      BUN 9 mg/dL      Creatinine 0.52 mg/dL      Sodium 138 mmol/L      Potassium 4.0 mmol/L      Chloride 104 mmol/L      CO2 24.2 mmol/L      Calcium 8.3 mg/dL      BUN/Creatinine Ratio 17.3     Anion Gap 9.8 mmol/L      eGFR 129.2 mL/min/1.73     Narrative:      GFR Categories in Chronic Kidney Disease (CKD)      GFR Category          GFR (mL/min/1.73)    Interpretation  G1                     90 or greater         Normal or high (1)  G2                      60-89                 Mild decrease (1)  G3a                   45-59                Mild to moderate decrease  G3b                   30-44                Moderate to severe decrease  G4                    15-29                Severe decrease  G5                    14 or less           Kidney failure          (1)In the absence of evidence of kidney disease, neither GFR category G1 or G2 fulfill the criteria for CKD.    eGFR calculation 2021 CKD-EPI creatinine equation, which does not include race as a factor    Valproic Acid Level, Total [[id number]]  (Abnormal) Collected: 03/16/25 0534    Specimen: Blood Updated: 03/16/25 0601     Valproic Acid 34.9 mcg/mL     Narrative:      Therapeutic Ranges for Valproic Acid    Epilepsy:       mcg/ml  Bipolar/Juany  up to 125 mcg/ml      CBC (No Diff) [[id number]]  (Abnormal) Collected: 03/16/25 0534    Specimen: Blood Updated: 03/16/25 0543     WBC 3.46 10*3/mm3      RBC 3.18 10*6/mm3      Hemoglobin 10.6 g/dL      Hematocrit 31.1 %      MCV 97.8 fL      MCH 33.3 pg      MCHC 34.1 g/dL      RDW 13.2 %      RDW-SD 47.5 fl      MPV 11.1 fL      Platelets 60 10*3/mm3     Fentanyl, Urine - Urine, Clean Catch [[id number]]  (Normal) Collected: 03/15/25 1246    Specimen: Urine, Clean Catch Updated: 03/15/25 1511     Fentanyl, Urine Negative    Narrative:      Negative Threshold:      Fentanyl 5 ng/mL     The normal value for the drug tested is negative. This report includes final unconfirmed screening results to be used for medical treatment purposes only. Unconfirmed results must not be used for non-medical purposes such as employment or legal testing. Clinical consideration should be applied to any drug of abuse test, particularly when unconfirmed results are used.                 Imaging Results (Last 24 Hours)       ** No results found for the last 24 hours. **          Orders (last 24 hrs)        Start     Ordered    03/16/25 0600  Basic Metabolic Panel  Morning Draw         03/15/25 1018     03/16/25 0600  CBC (No Diff)  Morning Draw         03/15/25 1018    03/16/25 0600  Valproic Acid Level, Total  Morning Draw         03/15/25 1111    03/15/25 1711  Inpatient Behavioral Health Consult  Once        Provider:  (Not yet assigned)    03/15/25 1711    03/15/25 1546  ziprasidone (GEODON) injection 20 mg  Every 4 Hours PRN         03/15/25 1546    03/15/25 1200  Divalproex Sodium (DEPAKOTE SPRINKLE) capsule 125 mg  Every 6 Hours Scheduled         03/15/25 1109    03/15/25 0900  pantoprazole (PROTONIX) EC tablet 40 mg  Daily         03/14/25 1910    03/15/25 0900  ARIPiprazole (ABILIFY) tablet 10 mg  Daily         03/14/25 2016    03/15/25 0332  LORazepam (ATIVAN) injection 1 mg  Every 4 Hours PRN         03/15/25 0332    03/14/25 2115  [Held by provider]  hydrOXYzine (ATARAX) tablet 50 mg  3 Times Daily        (On hold since Fri 3/14/2025 at 2235 until manually unheld; held by Natalie Mitchell DOHold Reason: Abnormal Labs)    03/14/25 2016 03/14/25 2100  sodium chloride 0.9 % flush 10 mL  Every 12 Hours Scheduled         03/14/25 1754 03/14/25 2100  cloBAZam (ONFI) tablet 20 mg  2 Times Daily         03/14/25 2029 03/14/25 2000  Vital Signs  Every 4 Hours       03/14/25 1754 03/14/25 1845  levothyroxine (SYNTHROID, LEVOTHROID) tablet 75 mcg  Daily         03/14/25 1754    03/14/25 1800  Oral Care  2 Times Daily       03/14/25 1754    03/14/25 1800  Strict Intake & Output  Every Hour       03/14/25 1754    03/14/25 1755  Intake & Output  Every Shift       03/14/25 1754    03/14/25 1755  Daily Weights  Daily       03/14/25 1754    03/14/25 1754  sodium chloride 0.9 % flush 10 mL  As Needed         03/14/25 1754    03/14/25 1754  sodium chloride 0.9 % infusion 40 mL  As Needed         03/14/25 1754 03/14/25 1754  nitroglycerin (NITROSTAT) SL tablet 0.4 mg  Every 5 Minutes PRN         03/14/25 1754    03/14/25 1715  sodium chloride 0.9 % infusion  Continuous         03/14/25 1655     25 1253  sodium chloride 0.9 % flush 10 mL  As Needed         25 1253    Unscheduled  Oxygen Therapy- Nasal Cannula; Titrate 1-6 LPM Per SpO2; 90 - 95%  Continuous PRN       25 1253    Unscheduled  Straight cath  As Needed       25 1447    Unscheduled  Up With Assistance  As Needed       25 1754    Unscheduled  Oxygen Therapy- Nasal Cannula; Titrate 1-6 LPM Per SpO2; 90 - 95%  Continuous PRN       25 1754    --  levETIRAcetam (KEPPRA) 500 MG tablet  2 Times Daily         25    --  pantoprazole (PROTONIX) 40 MG EC tablet  Daily         25    --  norethindrone-ethinyl estradiol (Junel ) 1-20 MG-MCG per tablet  Daily         25    --  diazePAM, 15 MG Dose, (VALTOCO) 2 x 7.5 MG/0.1ML liquid therapy pack  Once As Needed         25                     Physician Progress Notes (most recent note)        Tate Ojeda MD at 25 1355                [facility]    Name:  Ave Correia   Age:  29 y.o.  Sex:  female  :  1995  MRN:  [id number]   Visit Number:  [id number]  Admission Date:  3/14/2025  Date Of Service:  25  Primary Care Physician:  Reyna Loyd APRN     LOS: 0 days :    Chief Complaint:      Follow-up of altered mental status.    Subjective:    Ave Correia was seen and examined this morning.  She is more alert today but still nonverbal.  According to the nursing staff she has been talking but none comprehensible.  Does not seem to be in any acute distress.  Yesterday she was having significant crying spells and shouting spells.  She was given Geodon as well as Ativan this morning.    Hospital Course:    Ave Correia is a 28 yo female with a PMH of cerebral palsy, hemiparesis of right dominant side due to non-cerebral vascular etiology, eizures, anxiety and hypothyroidism presenting with altered mental status.  Of note, she is currently staying at a residential  "program and one of the staff members is at the bedside explaining what has occurred.  It appears that the patient at baseline can have full conversation although her speech is a little impaired and sometimes difficult to understand.  She is also able to ambulate despite having some difficulties due to her left hemiparesis.  It appears that around 3 AM this morning, she woke up screaming \"I hate you\", \"I will kill you\" until about 8 AM.  She went back to her baseline until around 12:30 when she fell to the floor.  The patient then became combative, and refused to get up. Staff did notice that her urine had a foul smell and looked dark.  EMS was called and she was found to be hypotensive and brought to our ED.  At time of admission, the patient was not following commands, and is groaning.      In ED, blood pressure 66/41, heart rate 75, respiratory rate of 16, temperature 97 O2 saturation 95% on room air.  Labs on arrival showed a sodium 134, potassium 4, BUN 15, creatinine 0.6, WBC 4, hemoglobin 11.7, and platelet count 81.  Patient's random valproic acid over 150.  Chest x-ray showed no acute infiltrates.  CT head showed no acute findings.  The patient was started on IV fluids and her blood pressure has improved to 101/73.  She was subsequently admitted to the medical floor with telemetry for further evaluation.    Patient was seen by Dr. Jackson from telemetry neurology.  He recommended to discontinue Keppra due to her drowsiness and recommended to continue Depakote sprinkles and recommended to use clobazam only at night and not during the daytime.  Neurology also recommended not to use lamotrigine along with Depakote as this can cause serious set up for Samir Ronald syndrome.    Review of Systems:     All systems were reviewed and negative except as mentioned in subjective, assessment and plan.    Vital Signs:    Temp:  [97.3 °F (36.3 °C)-97.5 °F (36.4 °C)] 97.4 °F (36.3 °C)  Heart Rate:  [54-87] 70  Resp:  " [16-18] 18  BP: (102-119)/(73-95) 117/86    Intake and output:    I/O last 3 completed shifts:  In: 2922.5 [P.O.:530; I.V.:2392.5]  Out: 850 [Urine:850]  No intake/output data recorded.    Physical Examination:    General Appearance:  Alert and cooperative.    Head:  Atraumatic and normocephalic.  Contracture of the neck noted.   Eyes: Conjunctivae and sclerae normal, no icterus. No pallor.   Throat: No oral lesions, no thrush, oral mucosa moist.   Neck: Supple, trachea midline, no thyromegaly.   Lungs:   Breath sounds heard bilaterally equally.  No wheezing or crackles. No Pleural rub or bronchial breathing.   Heart:  Normal S1 and S2, no murmur, no gallop, no rub. No JVD.   Abdomen:   Normal bowel sounds, no masses, no organomegaly. Soft, nontender, nondistended, no rebound tenderness.   Extremities: Supple, 2+ pitting ankle and leg edema, no cyanosis, no clubbing.  Bilateral and upper and lower extremity contractures noted.   Skin: No bleeding or rash.   Neurologic: Alert on call but nonverbal at this time. No facial asymmetry. Moves all four limbs but does have generalized weakness. No tremors.    Laboratory results:    Results from last 7 days   Lab Units 03/16/25  0534 03/15/25  0606 03/14/25  1255   SODIUM mmol/L 138 138 134*   POTASSIUM mmol/L 4.0 4.7 4.0   CHLORIDE mmol/L 104 104 98   CO2 mmol/L 24.2 28.2 26.8   BUN mg/dL 9 11 15   CREATININE mg/dL 0.52* 0.64 0.68   CALCIUM mg/dL 8.3* 7.9* 8.9   BILIRUBIN mg/dL  --   --  <0.2   ALK PHOS U/L  --   --  51   ALT (SGPT) U/L  --   --  16   AST (SGOT) U/L  --   --  28   GLUCOSE mg/dL 77 68 112*     Results from last 7 days   Lab Units 03/16/25  0534 03/15/25  0605 03/14/25  1255   WBC 10*3/mm3 3.46 4.14 4.79   HEMOGLOBIN g/dL 10.6* 9.7* 11.7*   HEMATOCRIT % 31.1* 29.2* 34.0   PLATELETS 10*3/mm3 60* 66* 81*       Results from last 7 days   Lab Units 03/14/25  1612 03/14/25  1255   HSTROP T ng/L 17* 20*       I have reviewed the patient's laboratory  results.    Radiology results:    No radiology results from the last 24 hrs    I have reviewed the patient's radiology reports.    Medication Review:     I have reviewed the patient's active and prn medications.     Problem List:      Altered mental status    Cerebral palsy    Seizure disorder    Assessment:    Acute metabolic encephalopathy, POA.  Hypovolemic hypotension, POA, improved.  Suspected valproic acid toxicity, POA.  Cerebral palsy.  Seizure disorder.  Hypothyroidism.  Chronic thrombocytopenia.    Plan:    Altered mental status/metabolic encephalopathy.  - Exact etiology of this is uncertain but could be related to medications, sepsis and hypotension.  - Patient was seen by Dr. Jackson from neurology.  At this time there is no evidence of seizures and patient may have underlying behavioral issues.  - He recommended to discontinue Keppra but continue Depakote.  He recommended to use the clobazam only at night.  He also recommended to discontinue lamotrigine as it can cause high risk for Weeks-Ronald syndrome when used concurrently with Depakote.    Suspected valproic acid toxicity.  - Patient's valproic acid level done yesterday was random and it can be elevated.  Repeat test done this morning was within normal range.  - Repeat valproic acid level this morning is low at 35.    Hypovolemic hypotension.  - Improved with IV hydration.  - No evidence of sepsis at this time as she has not been febrile and does not have leukocytosis.  - Urine analysis was fairly unremarkable and chest x-ray showed no infiltrate.  Respiratory panel was negative.    Patient may have underlying schizoaffective disorder and behavioral health has been consulted.    Discussed with nursing staff.    I have reviewed the copied text and it is accurate as of 03/16/25    DVT Prophylaxis: SCDs (Lovenox discontinued due to thrombocytopenia)  Code Status: Full  Diet: Regular  Discharge Plan: Pending    Tate Ojeda MD  03/16/25  13:55  EDT    Dictated utilizing Dragon dictation.      Electronically signed by Tate Ojeda MD at 25 1357          Consult Notes (most recent note)        Tyler Jackson MD at 03/15/25 1209        Consult Orders    1. Inpatient Neurology Consult General [[id number]] ordered by Tate Ojeda MD at 03/15/25 1026                 DOS: 3/15/2025  NAME: Ave Correia   : 1995  PCP: Reyna Loyd APRN  CC: Patient is very drowsy but brought in yesterday because questionable seizure versus behavioral changes.  Referring MD: Germain Hoyos MD, Dr. Tate Ojeda    Neurological Problem and Interval History:  29 y.o. 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  Also has a history of seizures.  Question is whether she had a breakthrough seizure in her group home or whether she had behavioral issues as when she came to the emergency room she was uncooperative with examination.  However she was also found to have urinary tract infection which is being treated.  When I saw her this morning through the telemedicine device she was very sleepy but she was arousable.  She did not say much but as per the nursing staff she did eat her scrambled eggs and there is evidence of it on her gown.  Hard to make her follow any commands as she is so sleepy.  However she is sitting upright without tilting to any side.    Past Medical/Surgical Hx:  Past Medical History:   Diagnosis Date    Blind right eye     Cerebral palsy     Hemiparesis of right dominant side due to non-cerebrovascular etiology     Seizures      Past Surgical History:   Procedure Laterality Date    DENTAL EXAMINATION UNDER ANESTHESIA W/ CLEANING AND XRAYS      Fillings placed.    IMPLANTATION VAGAL NERVE STIMULATOR         Review of Systems:    Constitutional: Patient sleepy resting comfortably in an upright position.  Cardiovascular: No chest pain or palpitations noted.  Respiratory: No  shortness of breath noted.  Gastrointestinal: No nausea and vomiting noted.  Genitourinary: Had difficulty with voiding with straight cath which caused her to have some behavioral issues but currently voiding on own.  Musculoskeletal: No weakness of the upper and lower extremities noted.  Dermatological: No skin breakdown noted.  Neurological: No seizure activity noted so far.  Psychiatric: Has behavioral issues from her underlying condition.  Ophthalmological: No visual changes noted so far.          Medications On Admission  Medications Prior to Admission   Medication Sig Dispense Refill Last Dose/Taking    ARIPiprazole (ABILIFY) 10 MG tablet Take 1 tablet by mouth Daily. 30 tablet 3 3/14/2025    citalopram (CeleXA) 20 MG tablet Take 1 tablet by mouth Daily. 31 tablet 3 3/14/2025    cloBAZam (ONFI) 10 MG tablet Take 2 tablets by mouth.   3/14/2025 Morning    cloBAZam (ONFI) 20 MG tablet 35 mg.   3/14/2025    divalproex (DEPAKOTE) 500 MG DR tablet Take 2 tablets by mouth 2 (Two) Times a Day.   3/14/2025    FeroSul 325 (65 Fe) MG tablet Take 1 tablet by mouth Daily.   3/14/2025    hydrOXYzine (ATARAX) 50 MG tablet Take 1 tablet by mouth 3 (Three) Times a Day. 90 tablet 3 Past Week    levETIRAcetam (KEPPRA) 500 MG tablet Take 2 tablets by mouth 2 (Two) Times a Day.   3/14/2025    levothyroxine (SYNTHROID, LEVOTHROID) 75 MCG tablet Take 1 tablet by mouth Daily.   3/14/2025 Morning    pantoprazole (PROTONIX) 40 MG EC tablet Take 1 tablet by mouth Daily.   3/14/2025    polyethylene glycol (MIRALAX) 17 GM/SCOOP powder Take 17 g by mouth Daily.   Past Month    QUEtiapine (SEROquel) 100 MG tablet Take 1 tablet by mouth Every Night. (Patient taking differently: Take 0.5 tablets by mouth Every Night.) 30 tablet 3 3/13/2025 Bedtime    bisacodyl (DULCOLAX) 5 MG EC tablet Take 1 tablet by mouth Daily As Needed for Constipation.   More than a month    diazePAM, 15 MG Dose, (VALTOCO) 2 x 7.5 MG/0.1ML liquid therapy pack  Administer 0.2 mL into the nostril(s) as directed by provider 1 (One) Time As Needed for Seizures. Instill 1 spray (per device) into one nostril once as needed for seizure. If a second dose is required, it may be administered 4 hours after the initial dose. Do not exceed 2 doses to treat a single episode.   More than a month    lamoTRIgine (LaMICtal) 25 MG tablet Take 4 tablets by mouth Every Night.       Levonorgest-Eth Estrad 91-Day (Jaimiess) 0.15-0.03 &0.01 MG Take 1 tablet by mouth Daily.   Unknown    multivitamin with minerals tablet tablet Take 1 tablet by mouth Daily.       norethindrone-ethinyl estradiol (Junel 1/20) 1-20 MG-MCG per tablet Take 1 tablet by mouth Daily.   Unknown       Prior to Admission medications    Medication Sig Start Date End Date Taking? Authorizing Provider   ARIPiprazole (ABILIFY) 10 MG tablet Take 1 tablet by mouth Daily. 1/27/25  Yes Teressa Abad APRN   citalopram (CeleXA) 20 MG tablet Take 1 tablet by mouth Daily. 1/27/25  Yes Teressa Abad APRN   cloBAZam (ONFI) 10 MG tablet Take 2 tablets by mouth. 3/29/23  Yes ProviderKelly MD   cloBAZam (ONFI) 20 MG tablet 35 mg.   Yes Provider, MD Kelly   divalproex (DEPAKOTE) 500 MG DR tablet Take 2 tablets by mouth 2 (Two) Times a Day.   Yes Provider, MD Kelly   FeroSul 325 (65 Fe) MG tablet Take 1 tablet by mouth Daily. 4/22/22  Yes ProviderKelly MD   hydrOXYzine (ATARAX) 50 MG tablet Take 1 tablet by mouth 3 (Three) Times a Day. 1/27/25  Yes Teressa Abad APRN   levETIRAcetam (KEPPRA) 500 MG tablet Take 2 tablets by mouth 2 (Two) Times a Day.   Yes Provider, MD Kelly   levothyroxine (SYNTHROID, LEVOTHROID) 75 MCG tablet Take 1 tablet by mouth Daily.   Yes Provider, MD Kelly   pantoprazole (PROTONIX) 40 MG EC tablet Take 1 tablet by mouth Daily.   Yes Provider, MD Kelly   polyethylene glycol (MIRALAX) 17 GM/SCOOP powder Take 17 g by mouth Daily. 4/22/22  Yes Provider,  MD Kelly   QUEtiapine (SEROquel) 100 MG tablet Take 1 tablet by mouth Every Night.  Patient taking differently: Take 0.5 tablets by mouth Every Night. 1/27/25  Yes Teressa Abad APRN   bisacodyl (DULCOLAX) 5 MG EC tablet Take 1 tablet by mouth Daily As Needed for Constipation.    Kelly Bell MD   diazePAM, 15 MG Dose, (VALTOCO) 2 x 7.5 MG/0.1ML liquid therapy pack Administer 0.2 mL into the nostril(s) as directed by provider 1 (One) Time As Needed for Seizures. Instill 1 spray (per device) into one nostril once as needed for seizure. If a second dose is required, it may be administered 4 hours after the initial dose. Do not exceed 2 doses to treat a single episode.    Kelly Bell MD   lamoTRIgine (LaMICtal) 25 MG tablet Take 4 tablets by mouth Every Night. 1/23/25   Kelly Bell MD   Levonorgest-Eth Estrad 91-Day (Jaimiess) 0.15-0.03 &0.01 MG Take 1 tablet by mouth Daily. 5/15/23   Kelly Bell MD   multivitamin with minerals tablet tablet Take 1 tablet by mouth Daily.    Kelly Bell MD   norethindrone-ethinyl estradiol (Junel 1/20) 1-20 MG-MCG per tablet Take 1 tablet by mouth Daily.    Kelly Bell MD        Allergies:  Allergies   Allergen Reactions    Versed [Midazolam] Unknown - High Severity       Social Hx:  Social History     Socioeconomic History    Marital status: Single   Tobacco Use    Smoking status: Never    Smokeless tobacco: Never   Vaping Use    Vaping status: Never Used   Substance and Sexual Activity    Alcohol use: Never    Drug use: Never       Family Hx:  History reviewed. No pertinent family history.    Review of Imaging (Interpretation of images not reports): XR CHEST 1 VW-     HISTORY: [facility] Protocol     COMPARISON: 5/15/2024.     FINDINGS: The lung apices are obscured by patient head positioning,  particularly the right lung apex. There is a left-sided vagal nerve  stimulator. There is elevation of the left hemidiaphragm.  There is  decreased inspiratory effort. No acute consolidation within the  visualized lung fields. The heart is normal in size. The mediastinum is  unremarkable. There is no pneumothorax.  There are no acute osseous  abnormalities.     IMPRESSION:  No acute infiltrate    CT Head Without Contrast  Result Date: 3/14/2025  PROCEDURE: CT HEAD WO CONTRAST-  HISTORY: eval for ams  COMPARISON: None.  TECHNIQUE: Multiple axial CT images were performed from the foramen magnum to the vertex. Individualized dose reduction techniques using automated exposure control or adjustment of mA and/or kV according to the patient size were employed.  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 The ventricles are enlarged. There is no evidence of edema or hemorrhage.  No masses are identified. No extra-axial fluid is seen. The paranasal sinuses demonstrate mucoperiosteal thickening right frontal, bilateral ethmoid and right maxillary sinuses. No air-fluid level is seen. Remaining paranasal sinuses and mastoids are clear.      Impression: Stable chronic change.  Sinus disease as described.   CTDI: 68.52 mGy DLP:1439.86 mGy.cm  This report was signed and finalized on 3/14/2025 1:47 PM by Donna Bran MD.             Additional Tests Performed:    Latest Reference Range & Units 03/14/25 22:51   Color, UA Yellow, Straw  Yellow   Appearance, UA Clear  Clear   Specific Gravity, UA 1.005 - 1.030  1.024   pH, UA 5.0 - 8.0  6.5   Glucose Negative  Negative   Ketones, UA Negative  Trace !   Blood, UA Negative  Small (1+) !   Nitrite, UA Negative  Negative   Leukocytes, UA Negative  Negative   Protein, UA Negative  Negative   Bilirubin, UA Negative  Negative   Urobilinogen, UA 0.2 - 1.0 E.U./dL  1.0 E.U./dL   RBC, UA None Seen, 0-2 /HPF 3-5 !   WBC, UA None Seen, 0-2 /HPF None Seen   Bacteria, UA None Seen /HPF None Seen   Squamous Epithelial Cells, UA None Seen, 0-2 /HPF None Seen   Hyaline  Casts, UA None Seen /LPF None Seen   Methodology:  Manual Light Microscopy   !: Data is abnormal    Results for orders placed during the hospital encounter of 08/15/22    Adult Transthoracic Echo Complete W/ Cont if Necessary Per Protocol    Interpretation Summary  Adequate technical quality.    1.  LV size, function, wall motion, and wall thickness are normal.  Visually estimated ejection fraction is 55 to 60%.  3D ejection fraction 62%.  Normal LV diastolic function and filling pressures.  The atria and right ventricle are normal.  No septal defect or intracavitary mass or thrombus.    2.  Valves are morphologically normal with no stenotic lesions or valve associated masses or thrombi.  There is trivial MR, trivial AI, and trivial to mild TR.    3.  No pericardial or great vessel pathology.    4.  Pulmonary artery systolic pressures are estimated at approximately 30 mmHg.          Laboratory Results:   Lab Results   Component Value Date    GLUCOSE 68 03/15/2025    CALCIUM 7.9 (L) 03/15/2025     03/15/2025    K 4.7 03/15/2025    CO2 28.2 03/15/2025     03/15/2025    BUN 11 03/15/2025    CREATININE 0.64 03/15/2025    EGFRIFAFRI >150 01/08/2022    BCR 17.2 03/15/2025    ANIONGAP 5.8 03/15/2025     Lab Results   Component Value Date    WBC 4.14 03/15/2025    HGB 9.7 (L) 03/15/2025    HCT 29.2 (L) 03/15/2025    MCV 99.7 (H) 03/15/2025    PLT 66 (L) 03/15/2025     Lab Results   Component Value Date    CHOL 187 05/15/2024     Lab Results   Component Value Date    HDL 92 (H) 05/15/2024     Lab Results   Component Value Date    LDL 84 05/15/2024     Lab Results   Component Value Date    TRIG 60 05/15/2024     Lab Results   Component Value Date    HGBA1C 4.80 05/15/2024     Lab Results   Component Value Date    INR 1.0 03/02/2022    INR 1.9 (H) 01/12/2015    PROTIME 10.8 03/02/2022    PROTIME 20.9 (H) 01/12/2015     Lab Results   Component Value Date    HJFVPEAF29 1,197 (H) 03/14/2025     Lab Results   Component  Value Date    FOLATE 18.20 03/14/2025     TSH          5/15/2024    12:00 3/14/2025    12:55 3/14/2025    16:12   TSH   TSH 1.150  2.930  3.270           Physical Examination:  BP 90/62 (BP Location: Right arm, Patient Position: Lying)   Pulse 70   Temp 97.5 °F (36.4 °C) (Axillary)   Resp 16   Wt 48.6 kg (107 lb 2.3 oz)   LMP 03/07/2025   SpO2 95%   BMI 24.03 kg/m²   General Appearance:   Well developed, well nourished, well groomed, drowsy at this time  HEENT: Normocephalic.    Neck and Spine: Normal range of motion.  Normal alignment. No mass or tenderness. No bruits.    Extremities:    No edema or deformities. Normal joint ROM.   Skin:    No rashes or birth marks.    Neurological examination:  Higher Integrative  Function: Very drowsy and not very interactive.  Opens her eyes to voice commands but does not verbalize.  The nurse has noted that she ate her breakfast without any issues..  CN II:  Pupils are equal, round, and reactive to light. Normal visual acuity and visual fields.    CN III IV VI: Extraocular movements are full without nystagmus.   CN V:  Normal facial sensation and strength of muscles of mastication.  CN VII:  Facial movements are symmetric. No weakness.  CN VIII: Auditory acuity is normal.  CN IX & X: Symmetric palatal movement.  CN XI:  Sternocleidomastoid and trapezius are normal.  No weakness.  CN XII:  The tongue is midline.  No atrophy or fasciculations.  Motor:  Normal muscle strength, bulk and tone in upper and lower extremities.  No fasciculations, rigidity, spasticity, or abnormal movements.  Sensation: Normal to light touch, pinprick, vibration, temperature, and proprioception in arms and legs. Normal graphesthesia and no extinction on DSS.  Station and Gait: She is in an upright position resting without any issues.    Coordination:  Could not be tested as she is extremely drowsy    Diagnoses / Discussion:  29 y.o. who presents with Sx of behavioral issues.  The [facility]  checked was a random 1 yesterday and so it looks as if it is extremely elevated 150.  Today it is 63.    Plan:  As she is so drowsy discussed about starting her on Depakote sprinkles 125 mg 4 times daily which will give her a total of 1000 mg in 24 hours as she is taking the extended release Depakote 500 mg twice daily with food.  To check a trough valproic acid level for tomorrow.  Discontinued the Keppra which will cause more behavioral issues in this kind of person.  To use the Clobazam only at nighttime and not in the daytime as it is causing too much drowsiness.  Keep her hydrated.  Encourage oral intake is much as possible.  Will reevaluate tomorrow to see if she is more cooperative and if she is back to baseline she can be discharged home.  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     I have discussed the above with the team and she will be followed up tomorrow by our inpatient teleneurology service..  Time spent with patient: 70 minutes in face-to-face evaluation and management of the patient using the dedicated telemedicine device without any interruption with the help of the rounding nurse with the patient located at the [facility] and myself at a remote location.    Electronically signed by Tyler Jackson MD, 03/15/25, 12:09 PM EDT.    Dictated using Dragon dictation.                Electronically signed by Tyler Jackson MD at 03/15/25 8913

## 2025-03-16 NOTE — PROGRESS NOTES
"      [facility]    Name:  Ave Correia   Age:  29 y.o.  Sex:  female  :  1995  MRN:  [id number]   Visit Number:  [id number]  Admission Date:  3/14/2025  Date Of Service:  25  Primary Care Physician:  Reyna Loyd APRN     LOS: 0 days :    Chief Complaint:      Follow-up of altered mental status.    Subjective:    Ave Correia was seen and examined this morning.  She is more alert today but still nonverbal.  According to the nursing staff she has been talking but none comprehensible.  Does not seem to be in any acute distress.  Yesterday she was having significant crying spells and shouting spells.  She was given Geodon as well as Ativan this morning.    Hospital Course:    Ave Correia is a 28 yo female with a PMH of cerebral palsy, hemiparesis of right dominant side due to non-cerebral vascular etiology, eizures, anxiety and hypothyroidism presenting with altered mental status.  Of note, she is currently staying at a residential program and one of the staff members is at the bedside explaining what has occurred.  It appears that the patient at baseline can have full conversation although her speech is a little impaired and sometimes difficult to understand.  She is also able to ambulate despite having some difficulties due to her left hemiparesis.  It appears that around 3 AM this morning, she woke up screaming \"I hate you\", \"I will kill you\" until about 8 AM.  She went back to her baseline until around 12:30 when she fell to the floor.  The patient then became combative, and refused to get up. Staff did notice that her urine had a foul smell and looked dark.  EMS was called and she was found to be hypotensive and brought to our ED.  At time of admission, the patient was not following commands, and is groaning.      In ED, blood pressure 66/41, heart rate 75, respiratory rate of 16, temperature 97 O2 saturation 95% on room air.  Labs on arrival " showed a sodium 134, potassium 4, BUN 15, creatinine 0.6, WBC 4, hemoglobin 11.7, and platelet count 81.  Patient's random valproic acid over 150.  Chest x-ray showed no acute infiltrates.  CT head showed no acute findings.  The patient was started on IV fluids and her blood pressure has improved to 101/73.  She was subsequently admitted to the medical floor with telemetry for further evaluation.    Patient was seen by Dr. Jackson from telemetry neurology.  He recommended to discontinue Keppra due to her drowsiness and recommended to continue Depakote sprinkles and recommended to use clobazam only at night and not during the daytime.  Neurology also recommended not to use lamotrigine along with Depakote as this can cause serious set up for Samir Ronald syndrome.    Review of Systems:     All systems were reviewed and negative except as mentioned in subjective, assessment and plan.    Vital Signs:    Temp:  [97.3 °F (36.3 °C)-97.5 °F (36.4 °C)] 97.4 °F (36.3 °C)  Heart Rate:  [54-87] 70  Resp:  [16-18] 18  BP: (102-119)/(73-95) 117/86    Intake and output:    I/O last 3 completed shifts:  In: 2922.5 [P.O.:530; I.V.:2392.5]  Out: 850 [Urine:850]  No intake/output data recorded.    Physical Examination:    General Appearance:  Alert and cooperative.    Head:  Atraumatic and normocephalic.  Contracture of the neck noted.   Eyes: Conjunctivae and sclerae normal, no icterus. No pallor.   Throat: No oral lesions, no thrush, oral mucosa moist.   Neck: Supple, trachea midline, no thyromegaly.   Lungs:   Breath sounds heard bilaterally equally.  No wheezing or crackles. No Pleural rub or bronchial breathing.   Heart:  Normal S1 and S2, no murmur, no gallop, no rub. No JVD.   Abdomen:   Normal bowel sounds, no masses, no organomegaly. Soft, nontender, nondistended, no rebound tenderness.   Extremities: Supple, 2+ pitting ankle and leg edema, no cyanosis, no clubbing.  Bilateral and upper and lower extremity contractures  noted.   Skin: No bleeding or rash.   Neurologic: Alert on call but nonverbal at this time. No facial asymmetry. Moves all four limbs but does have generalized weakness. No tremors.    Laboratory results:    Results from last 7 days   Lab Units 03/16/25  0534 03/15/25  0606 03/14/25  1255   SODIUM mmol/L 138 138 134*   POTASSIUM mmol/L 4.0 4.7 4.0   CHLORIDE mmol/L 104 104 98   CO2 mmol/L 24.2 28.2 26.8   BUN mg/dL 9 11 15   CREATININE mg/dL 0.52* 0.64 0.68   CALCIUM mg/dL 8.3* 7.9* 8.9   BILIRUBIN mg/dL  --   --  <0.2   ALK PHOS U/L  --   --  51   ALT (SGPT) U/L  --   --  16   AST (SGOT) U/L  --   --  28   GLUCOSE mg/dL 77 68 112*     Results from last 7 days   Lab Units 03/16/25  0534 03/15/25  0605 03/14/25  1255   WBC 10*3/mm3 3.46 4.14 4.79   HEMOGLOBIN g/dL 10.6* 9.7* 11.7*   HEMATOCRIT % 31.1* 29.2* 34.0   PLATELETS 10*3/mm3 60* 66* 81*       Results from last 7 days   Lab Units 03/14/25  1612 03/14/25  1255   HSTROP T ng/L 17* 20*       I have reviewed the patient's laboratory results.    Radiology results:    No radiology results from the last 24 hrs    I have reviewed the patient's radiology reports.    Medication Review:     I have reviewed the patient's active and prn medications.     Problem List:      Altered mental status    Cerebral palsy    Seizure disorder    Assessment:    Acute metabolic encephalopathy, POA.  Hypovolemic hypotension, POA, improved.  Suspected valproic acid toxicity, POA.  Cerebral palsy.  Seizure disorder.  Hypothyroidism.  Chronic thrombocytopenia.    Plan:    Altered mental status/metabolic encephalopathy.  - Exact etiology of this is uncertain but could be related to medications, sepsis and hypotension.  - Patient was seen by Dr. Jackson from neurology.  At this time there is no evidence of seizures and patient may have underlying behavioral issues.  - He recommended to discontinue Keppra but continue Depakote.  He recommended to use the clobazam only at night.  He also  recommended to discontinue lamotrigine as it can cause high risk for Weeks-Ronald syndrome when used concurrently with Depakote.    Suspected valproic acid toxicity.  - Patient's valproic acid level done yesterday was random and it can be elevated.  Repeat test done this morning was within normal range.  - Repeat valproic acid level this morning is low at 35.    Hypovolemic hypotension.  - Improved with IV hydration.  - No evidence of sepsis at this time as she has not been febrile and does not have leukocytosis.  - Urine analysis was fairly unremarkable and chest x-ray showed no infiltrate.  Respiratory panel was negative.    Patient may have underlying schizoaffective disorder and behavioral health has been consulted.    Discussed with nursing staff.    I have reviewed the copied text and it is accurate as of 03/16/25    DVT Prophylaxis: SCDs (Lovenox discontinued due to thrombocytopenia)  Code Status: Full  Diet: Regular  Discharge Plan: Pending    Tate Ojeda MD  03/16/25  13:55 EDT    Dictated utilizing Dragon dictation.

## 2025-03-16 NOTE — PROGRESS NOTES
DOS: 3/16/2025  NAME: Ave Correia   : 1995  PCP: Reyna Loyd APRN  Chief Complaint   Patient presents with    Fall     Pt was brought in from the [facility] for a fall.       Chief complaint: Patient had a lot of behavioral issues last evening but currently has calmed down.  Subjective: This morning she was awake and took all her medications and then wanted to go out even though it was raining so much.  When I visited her through the telemedicine device and noticed that she is rocking back and forth which is just like a comfort measure in patients with mental retardation or in children when they go to sleep.    Objective:  Vital signs: /86 (BP Location: Right arm, Patient Position: Lying)   Pulse 70   Temp 97.4 °F (36.3 °C) (Axillary)   Resp 18   Wt 52.1 kg (114 lb 13.8 oz)   LMP 2025   SpO2 97%   BMI 25.77 kg/m²    Gen: NAD, vitals reviewed  MS: Functions at the level of a toddler at this point  CN: Cranials 2-12: No focal deficits.  Motor: Moves all extremities.  Currently rocking back and forth.  Sensory: No sensory loss noted.  Coordination: Cannot be determined at this time  Gait: Patient not weightbearing at this time but has been rocking back and forth in the bed to comfort herself.    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    Laboratory results:  Lab Results   Component Value Date    GLUCOSE 77 2025    CALCIUM 8.3 (L) 2025     2025    K 4.0 2025    CO2 24.2 2025     2025    BUN 9 2025    CREATININE 0.52 (L) 2025    EGFRIFAFRI >150 2022    BCR 17.3 2025    ANIONGAP 9.8 2025     Lab Results   Component Value Date    WBC 3.46 2025    HGB 10.6 (L) 2025    HCT 31.1 (L) 2025    MCV 97.8 (H) 2025    PLT 60 (L) 2025     Lab Results   Component Value Date    LDL 84  05/15/2024            Review of labs: Low hemoglobin of 10.6 and low hematocrit of 31.1 and a low platelet count of 60,000.     CMP:        Lab 03/16/25  0534 03/15/25  0606 03/14/25  1255   SODIUM 138 138 134*   POTASSIUM 4.0 4.7 4.0   CHLORIDE 104 104 98   CO2 24.2 28.2 26.8   ANION GAP 9.8 5.8 9.2   BUN 9 11 15   CREATININE 0.52* 0.64 0.68   EGFR 129.2 122.9 121.1   GLUCOSE 77 68 112*   CALCIUM 8.3* 7.9* 8.9   TOTAL PROTEIN  --   --  6.6   ALBUMIN  --   --  3.9   GLOBULIN  --   --  2.7   ALT (SGPT)  --   --  16   AST (SGOT)  --   --  28   BILIRUBIN  --   --  <0.2   ALK PHOS  --   --  51        TSH          5/15/2024    12:00 3/14/2025    12:55 3/14/2025    16:12   TSH   TSH 1.150  2.930  3.270         Lipid Panel          5/15/2024    12:00   Lipid Panel   Total Cholesterol 187    Triglycerides 60    HDL Cholesterol 92    VLDL Cholesterol 11    LDL Cholesterol  84    LDL/HDL Ratio 0.90         Lab Results   Component Value Date    UVILAUFC23 1,197 (H) 03/14/2025       Lab Results   Component Value Date    FOLATE 18.20 03/14/2025       Lab Results   Component Value Date    HGBA1C 4.80 05/15/2024           Review and interpretation of imaging: XR CHEST 1 VW-     HISTORY: AMS Protocol     COMPARISON: 5/15/2024.     FINDINGS: The lung apices are obscured by patient head positioning,  particularly the right lung apex. There is a left-sided vagal nerve  stimulator. There is elevation of the left hemidiaphragm. There is  decreased inspiratory effort. No acute consolidation within the  visualized lung fields. The heart is normal in size. The mediastinum is  unremarkable. There is no pneumothorax.  There are no acute osseous  abnormalities.     IMPRESSION:  No acute infiltrate       CT Head Without Contrast  Result Date: 3/14/2025  PROCEDURE: CT HEAD WO CONTRAST-  HISTORY: eval for ams  COMPARISON: None.  TECHNIQUE: Multiple axial CT images were performed from the foramen magnum to the vertex. Individualized dose  reduction techniques using automated exposure control or adjustment of mA and/or kV according to the patient size were employed.  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 The ventricles are enlarged. There is no evidence of edema or hemorrhage.  No masses are identified. No extra-axial fluid is seen. The paranasal sinuses demonstrate mucoperiosteal thickening right frontal, bilateral ethmoid and right maxillary sinuses. No air-fluid level is seen. Remaining paranasal sinuses and mastoids are clear.      Impression: Stable chronic change.  Sinus disease as described.   CTDI: 68.52 mGy DLP:1439.86 mGy.cm  This report was signed and finalized on 3/14/2025 1:47 PM by Donna Bran MD.                Workup to date:   Latest Reference Range & Units 03/14/25 22:51   Color, UA Yellow, Straw  Yellow   Appearance, UA Clear  Clear   Specific Gravity, UA 1.005 - 1.030  1.024   pH, UA 5.0 - 8.0  6.5   Glucose Negative  Negative   Ketones, UA Negative  Trace !   Blood, UA Negative  Small (1+) !   Nitrite, UA Negative  Negative   Leukocytes, UA Negative  Negative   Protein, UA Negative  Negative   Bilirubin, UA Negative  Negative   Urobilinogen, UA 0.2 - 1.0 E.U./dL  1.0 E.U./dL   RBC, UA None Seen, 0-2 /HPF 3-5 !   WBC, UA None Seen, 0-2 /HPF None Seen   Bacteria, UA None Seen /HPF None Seen   Squamous Epithelial Cells, UA None Seen, 0-2 /HPF None Seen   Hyaline Casts, UA None Seen /LPF None Seen   Methodology:  Manual Light Microscopy   !: Data is abnormal    Results for orders placed during the hospital encounter of 08/15/22    Adult Transthoracic Echo Complete W/ Cont if Necessary Per Protocol    Interpretation Summary  Adequate technical quality.    1.  LV size, function, wall motion, and wall thickness are normal.  Visually estimated ejection fraction is 55 to 60%.  3D ejection fraction 62%.  Normal LV diastolic function and filling pressures.  The atria and right  ventricle are normal.  No septal defect or intracavitary mass or thrombus.    2.  Valves are morphologically normal with no stenotic lesions or valve associated masses or thrombi.  There is trivial MR, trivial AI, and trivial to mild TR.    3.  No pericardial or great vessel pathology.    4.  Pulmonary artery systolic pressures are estimated at approximately 30 mmHg.         Diagnoses: Patient with behavioral issues.  Do not see any issue with seizure disorder at this point.      Comment: Patient currently rocking herself back and forth to comfort herself to go to sleep.  Plan:  1.  Continue medications for behavioral issues.  2.  I am not concerned about seizures at this time.  3.  Once she is more awake and alert and able to swallow the fooled Depakote pill which is big in size then we can switch the Depakote sprinkles to the extended release version at 500 mg twice daily with food.  4.  Continue her home medications as before.    Discussed with the care team and at this point I do not have any further suggestions and will be signing off.    Spent a total of 30 minutes in face-to-face evaluation and management of the patient using the dedicated telemedicine device without any interruption with the help of the rounding nurse Christal with the patient located at the [facility] and myself at a remote location.    Electronically signed by Tyler Jackson MD, 03/16/25, 1:46 PM EDT.

## 2025-03-16 NOTE — THERAPY EVALUATION
PT virgie held this date, due to pt refusing evaluation x2 this date. PT to follow up tomorrow as appropriate.

## 2025-03-16 NOTE — PLAN OF CARE
Goal Outcome Evaluation:  Plan of Care Reviewed With: patient        Progress: no change            Problem: Adult Inpatient Plan of Care  Goal: Plan of Care Review  Outcome: Progressing  Flowsheets (Taken 3/16/2025 0535)  Progress: no change  Plan of Care Reviewed With: patient  Goal: Patient-Specific Goal (Individualized)  Outcome: Progressing  Goal: Absence of Hospital-Acquired Illness or Injury  Outcome: Progressing  Intervention: Identify and Manage Fall Risk  Recent Flowsheet Documentation  Taken 3/16/2025 0434 by Tincher, Latanya, LPN  Safety Promotion/Fall Prevention:   activity supervised   assistive device/personal items within reach   clutter free environment maintained  Taken 3/16/2025 0200 by Latanya Jiménez LPN  Safety Promotion/Fall Prevention:   activity supervised   assistive device/personal items within reach   clutter free environment maintained   fall prevention program maintained   nonskid shoes/slippers when out of bed   room organization consistent   safety round/check completed  Taken 3/16/2025 0000 by Tincher, Latanya, LPN  Safety Promotion/Fall Prevention:   activity supervised   assistive device/personal items within reach   clutter free environment maintained   fall prevention program maintained   nonskid shoes/slippers when out of bed   room organization consistent   safety round/check completed  Taken 3/15/2025 2200 by Tincher, Latanya, LPN  Safety Promotion/Fall Prevention:   activity supervised   assistive device/personal items within reach   clutter free environment maintained   fall prevention program maintained   nonskid shoes/slippers when out of bed   room organization consistent   safety round/check completed  Taken 3/15/2025 2010 by Tincher, Latanya, LPN  Safety Promotion/Fall Prevention:   activity supervised   assistive device/personal items within reach   clutter free environment maintained   fall prevention program maintained   nonskid shoes/slippers when out of  bed   room organization consistent   safety round/check completed  Intervention: Prevent Skin Injury  Recent Flowsheet Documentation  Taken 3/16/2025 0434 by Latanya Jiménez LPN  Body Position: supine  Taken 3/16/2025 0200 by Latanya Jiménez LPN  Body Position: sitting up in bed  Taken 3/16/2025 0000 by Latanya Jiménez LPN  Body Position:   supine   legs elevated  Taken 3/15/2025 2200 by Latanya Jiménez LPN  Body Position: supine  Taken 3/15/2025 2010 by Latanya Jiménez LPN  Body Position: sitting up in bed  Skin Protection:   incontinence pads utilized   transparent dressing maintained   pulse oximeter probe site changed  Intervention: Prevent and Manage VTE (Venous Thromboembolism) Risk  Recent Flowsheet Documentation  Taken 3/15/2025 2010 by Latanya Jiménez LPN  VTE Prevention/Management: (see MAR) other (see comments)  Intervention: Prevent Infection  Recent Flowsheet Documentation  Taken 3/16/2025 0434 by Latanya Jiménez LPN  Infection Prevention:   environmental surveillance performed   hand hygiene promoted   rest/sleep promoted   single patient room provided  Taken 3/16/2025 0200 by Latanya Jiménez LPN  Infection Prevention:   environmental surveillance performed   hand hygiene promoted   rest/sleep promoted   single patient room provided  Taken 3/16/2025 0000 by Latanya Jiménez LPN  Infection Prevention:   environmental surveillance performed   rest/sleep promoted   single patient room provided  Taken 3/15/2025 2200 by Latanya Jiménez LPN  Infection Prevention:   environmental surveillance performed   rest/sleep promoted   single patient room provided  Taken 3/15/2025 2010 by Latanya Jiménez LPN  Infection Prevention:   environmental surveillance performed   rest/sleep promoted   single patient room provided  Goal: Optimal Comfort and Wellbeing  Outcome: Progressing  Intervention: Provide Person-Centered Care  Recent Flowsheet Documentation  Taken 3/15/2025 2010  by Latanya Jiménez LPN  Trust Relationship/Rapport:   care explained   choices provided   emotional support provided   thoughts/feelings acknowledged  Goal: Readiness for Transition of Care  Outcome: Progressing     Problem: Violence Risk or Actual  Goal: Anger and Impulse Control  Outcome: Progressing  Intervention: Minimize Safety Risk  Recent Flowsheet Documentation  Taken 3/16/2025 0434 by Latanya Jiménez LPN  Enhanced Safety Measures: bed alarm set  Taken 3/16/2025 0200 by Latanya Jiménez LPN  Enhanced Safety Measures: bed alarm set  Taken 3/16/2025 0000 by Latanya Jiménez LPN  Enhanced Safety Measures: bed alarm set  Taken 3/15/2025 2200 by Latanya Jiménez LPN  Enhanced Safety Measures: bed alarm set  Taken 3/15/2025 2010 by Latanya Jiménez LPN  Sensory Stimulation Regulation:   care clustered   lighting decreased  Enhanced Safety Measures: bed alarm set     Problem: Skin Injury Risk Increased  Goal: Skin Health and Integrity  Outcome: Progressing  Intervention: Optimize Skin Protection  Recent Flowsheet Documentation  Taken 3/16/2025 0434 by Latanya Jiménez LPN  Activity Management: activity encouraged  Head of Bed (HOB) Positioning: HOB elevated  Taken 3/16/2025 0200 by Latanya Jiménez LPN  Activity Management: activity encouraged  Head of Bed (HOB) Positioning: HOB elevated  Taken 3/16/2025 0000 by Latanya Jiménez LPN  Activity Management: activity encouraged  Head of Bed (HOB) Positioning: HOB elevated  Taken 3/15/2025 2200 by Latanya Jiménez LPN  Activity Management: activity encouraged  Head of Bed (HOB) Positioning: HOB elevated  Taken 3/15/2025 2010 by Latanya Jiménez LPN  Activity Management: activity encouraged  Pressure Reduction Techniques:   frequent weight shift encouraged   weight shift assistance provided  Head of Bed (HOB) Positioning: HOB elevated  Pressure Reduction Devices: positioning supports utilized  Skin Protection:   incontinence pads  utilized   transparent dressing maintained   pulse oximeter probe site changed     Problem: Comorbidity Management  Goal: Maintenance of Asthma Control  Outcome: Progressing  Goal: Maintenance of Behavioral Health Symptom Control  Outcome: Progressing  Goal: Maintenance of COPD Symptom Control  Outcome: Progressing  Goal: Blood Glucose Level Within Target Range  Outcome: Progressing  Goal: Maintenance of Heart Failure Symptom Control  Outcome: Progressing  Goal: Blood Pressure in Desired Range  Outcome: Progressing  Goal: Maintenance of Osteoarthritis Symptom Control  Outcome: Progressing  Intervention: Maintain Osteoarthritis Symptom Control  Recent Flowsheet Documentation  Taken 3/16/2025 0434 by Latanya Jiménez LPN  Activity Management: activity encouraged  Taken 3/16/2025 0200 by Latanya Jiménez LPN  Activity Management: activity encouraged  Taken 3/16/2025 0000 by Latanya Jiménez LPN  Activity Management: activity encouraged  Taken 3/15/2025 2200 by Latanya Jiménez LPN  Activity Management: activity encouraged  Taken 3/15/2025 2010 by Latanya Jiménez LPN  Activity Management: activity encouraged  Goal: Bariatric Home Regimen Maintained  Outcome: Progressing  Goal: Maintenance of Seizure Control  Outcome: Progressing  Intervention: Maintain Seizure Symptom Control  Recent Flowsheet Documentation  Taken 3/15/2025 2010 by Latanya Jiménez LPN  Sensory Stimulation Regulation:   care clustered   lighting decreased     Problem: Fall Injury Risk  Goal: Absence of Fall and Fall-Related Injury  Outcome: Progressing  Intervention: Promote Injury-Free Environment  Recent Flowsheet Documentation  Taken 3/16/2025 0434 by Tincher, Latanya, LPN  Safety Promotion/Fall Prevention:   activity supervised   assistive device/personal items within reach   clutter free environment maintained  Taken 3/16/2025 0200 by Latanya Jiménez LPN  Safety Promotion/Fall Prevention:   activity supervised   assistive  device/personal items within reach   clutter free environment maintained   fall prevention program maintained   nonskid shoes/slippers when out of bed   room organization consistent   safety round/check completed  Taken 3/16/2025 0000 by Tincher, Latanya, LPN  Safety Promotion/Fall Prevention:   activity supervised   assistive device/personal items within reach   clutter free environment maintained   fall prevention program maintained   nonskid shoes/slippers when out of bed   room organization consistent   safety round/check completed  Taken 3/15/2025 2200 by Tincher, Latanya, LPN  Safety Promotion/Fall Prevention:   activity supervised   assistive device/personal items within reach   clutter free environment maintained   fall prevention program maintained   nonskid shoes/slippers when out of bed   room organization consistent   safety round/check completed  Taken 3/15/2025 2010 by Tincher, Latanya, LPN  Safety Promotion/Fall Prevention:   activity supervised   assistive device/personal items within reach   clutter free environment maintained   fall prevention program maintained   nonskid shoes/slippers when out of bed   room organization consistent   safety round/check completed

## 2025-03-17 PROCEDURE — 97166 OT EVAL MOD COMPLEX 45 MIN: CPT

## 2025-03-17 PROCEDURE — 25810000003 SODIUM CHLORIDE 0.9 % SOLUTION: Performed by: STUDENT IN AN ORGANIZED HEALTH CARE EDUCATION/TRAINING PROGRAM

## 2025-03-17 PROCEDURE — 99221 1ST HOSP IP/OBS SF/LOW 40: CPT | Performed by: SPECIALIST

## 2025-03-17 PROCEDURE — 97162 PT EVAL MOD COMPLEX 30 MIN: CPT

## 2025-03-17 PROCEDURE — 25010000002 LORAZEPAM PER 2 MG: Performed by: FAMILY MEDICINE

## 2025-03-17 PROCEDURE — 99232 SBSQ HOSP IP/OBS MODERATE 35: CPT | Performed by: INTERNAL MEDICINE

## 2025-03-17 RX ORDER — ARIPIPRAZOLE 5 MG/1
10 TABLET ORAL 2 TIMES DAILY
Status: DISCONTINUED | OUTPATIENT
Start: 2025-03-17 | End: 2025-03-19 | Stop reason: HOSPADM

## 2025-03-17 RX ORDER — DIVALPROEX SODIUM 125 MG/1
250 CAPSULE, COATED PELLETS ORAL EVERY 6 HOURS SCHEDULED
Status: DISCONTINUED | OUTPATIENT
Start: 2025-03-17 | End: 2025-03-19 | Stop reason: HOSPADM

## 2025-03-17 RX ORDER — CLOBAZAM 10 MG/1
20 TABLET ORAL EVERY EVENING
Status: DISCONTINUED | OUTPATIENT
Start: 2025-03-17 | End: 2025-03-19 | Stop reason: HOSPADM

## 2025-03-17 RX ADMIN — Medication 10 ML: at 20:21

## 2025-03-17 RX ADMIN — LORAZEPAM 1 MG: 2 INJECTION INTRAMUSCULAR; INTRAVENOUS at 06:28

## 2025-03-17 RX ADMIN — SODIUM CHLORIDE 75 ML/HR: 9 INJECTION, SOLUTION INTRAVENOUS at 23:20

## 2025-03-17 RX ADMIN — SODIUM CHLORIDE 75 ML/HR: 9 INJECTION, SOLUTION INTRAVENOUS at 00:49

## 2025-03-17 NOTE — PLAN OF CARE
Goal Outcome Evaluation:  Plan of Care Reviewed With: patient        Progress: no change  Outcome Evaluation: Pt seen for OT evaluation today.  Pt received supine in bed, no staff from [facility] present.  Pt able to state her name and birthday, but did not say really anything more during the evaluation.  Pt max assist to roll in bed to get her depends changed and her cleaned up.  Pt max assist of 2 to sit eob, but was able to sit unsupported demonstrating good balance.  Pt partially stood with max assist of 2, but did not take any steps.  pt was max assist of 2 to lay back down in bed.  After evaluation, OT called staff at the [facility] to determine pts PLOF.  Per Caroline, her caregiver, pt is able to bathe with min assist, dresses herself independently and walks independently in her home.  Pt is able to go to the bathroom independently with occasional assistance for thorough cleaning.  Pt is expected to benefit from skilled OT to improve her independence with ADL and functional mobility tasks.    Anticipated Discharge Disposition (OT): adult foster care/group home

## 2025-03-17 NOTE — THERAPY EVALUATION
Patient Name: Ave Correia  : 1995    MRN: [id number]                              Today's Date: 3/17/2025       Admit Date: 3/14/2025    Visit Dx:     ICD-10-CM ICD-9-CM   1. [place] coma scale total score 9-12, at arrival to emergency department  R40.2422 780.01   2. Hypotension due to hypovolemia  E86.1 458.8     276.52     Patient Active Problem List   Diagnosis    Blind left eye    Altered mental status    Cerebral palsy    Seizure disorder     Past Medical History:   Diagnosis Date    Blind right eye     Cerebral palsy     Hemiparesis of right dominant side due to non-cerebrovascular etiology     Impaired mobility     Seizures      Past Surgical History:   Procedure Laterality Date    DENTAL EXAMINATION UNDER ANESTHESIA W/ CLEANING AND XRAYS      Fillings placed.    IMPLANTATION VAGAL NERVE STIMULATOR        General Information       Row Name 25 1310          Physical Therapy Time and Intention    Document Type evaluation  -     Mode of Treatment physical therapy  -       Row Name 25 1310          General Information    Patient Profile Reviewed yes  -     Prior Level of Function independent:;all household mobility  per caregiver  -     Existing Precautions/Restrictions fall;seizures  -     Barriers to Rehab medically complex;previous functional deficit;cognitive status  -       Row Name 25 1310          Living Environment    Current Living Arrangements group home  -     People in Home facility resident  -       Row Name 25 1310          Home Main Entrance    Number of Stairs, Main Entrance none  -       Row Name 25 1310          Stairs Within Home, Primary    Number of Stairs, Within Home, Primary none  -       Row Name 25 1310          Cognition    Orientation Status (Cognition) oriented to;person  -       Row Name 25 1310          Safety Issues/Impairments Affecting Functional Mobility    Safety Issues Affecting Function (Mobility)  ability to follow commands;insight into deficits/self-awareness;judgment;problem-solving;safety precaution awareness;safety precautions follow-through/compliance;sequencing abilities  -     Impairments Affecting Function (Mobility) balance;endurance/activity tolerance;grasp;range of motion (ROM);postural/trunk control;motor control;coordination;cognition  -     Cognitive Impairments, Mobility Safety/Performance awareness, need for assistance;insight into deficits/self-awareness;judgment;problem-solving/reasoning;safety precaution awareness;sequencing abilities  -               User Key  (r) = Recorded By, (t) = Taken By, (c) = Cosigned By      Initials Name Provider Type    Eusebia Aj PT Physical Therapist                   Mobility       Row Name 03/17/25 1313          Bed Mobility    Bed Mobility supine-sit;sit-supine;rolling right  -     Rolling Right [place] (Bed Mobility) maximum assist (25% patient effort);2 person assist  -HW     Supine-Sit [place] (Bed Mobility) maximum assist (25% patient effort);2 person assist  -HW     Sit-Supine [place] (Bed Mobility) maximum assist (25% patient effort);2 person assist  -     Assistive Device (Bed Mobility) head of bed elevated;repositioning sheet  -       Row Name 03/17/25 1313          Sit-Stand Transfer    Sit-Stand [place] (Transfers) maximum assist (25% patient effort);2 person assist  -     Assistive Device (Sit-Stand Transfers) other (see comments)  gait belt, HHA  -       Row Name 03/17/25 1313          Gait/Stairs (Locomotion)    [place] Level (Gait) unable to assess  -     [place] Level (Stairs) unable to assess  -               User Key  (r) = Recorded By, (t) = Taken By, (c) = Cosigned By      Initials Name Provider Type    Eusebia Aj PT Physical Therapist                   Obj/Interventions       Row Name 03/17/25 1314          Range of Motion Comprehensive    General Range of Motion other (see  comments)  -     Comment, General Range of Motion difficult to assess, pt was resistant to LE ROM assessemnt  -       Row Name 03/17/25 1314          Strength Comprehensive (MMT)    General Manual Muscle Testing (MMT) Assessment other (see comments)  -     Comment, General Manual Muscle Testing (MMT) Assessment difficult to assess, pt unable to follow commands for LE MMT assessment  -       Row Name 03/17/25 1314          Balance    Balance Assessment sitting static balance;sitting dynamic balance;standing static balance;standing dynamic balance  -     Static Sitting Balance standby assist  -     Dynamic Sitting Balance standby assist  -     Position, Sitting Balance unsupported;sitting edge of bed  -     Static Standing Balance maximum assist;2-person assist  -     Dynamic Standing Balance maximum assist;2-person assist  -     Position/Device Used, Standing Balance supported;other (see comments)  gait belt, HHA  -       Row Name 03/17/25 1314          Sensory Assessment (Somatosensory)    Sensory Assessment (Somatosensory) unable/difficult to assess  -               User Key  (r) = Recorded By, (t) = Taken By, (c) = Cosigned By      Initials Name Provider Type     Eusebia Livingston, PT Physical Therapist                   Goals/Plan       [facility] Name 03/17/25 1323          Bed Mobility Goal 1 (PT)    Activity/Assistive Device (Bed Mobility Goal 1, PT) bed mobility activities, all  -     [place] Level/Cues Needed (Bed Mobility Goal 1, PT) moderate assist (50-74% patient effort)  -     Time Frame (Bed Mobility Goal 1, PT) long term goal (LTG);10 days  -     Progress/Outcomes (Bed Mobility Goal 1, PT) new goal  -       Row Name 03/17/25 1323          Transfer Goal 1 (PT)    Activity/Assistive Device (Transfer Goal 1, PT) transfers, all  -     [place] Level/Cues Needed (Transfer Goal 1, PT) moderate assist (50-74% patient effort)  -     Time Frame (Transfer Goal 1, PT) long term  goal (LTG);10 days  -HW     Progress/Outcome (Transfer Goal 1, PT) new goal  -       Row Name 03/17/25 1323          Gait Training Goal 1 (PT)    Activity/Assistive Device (Gait Training Goal 1, PT) gait (walking locomotion)  -HW     [place] Level (Gait Training Goal 1, PT) moderate assist (50-74% patient effort)  -HW     Distance (Gait Training Goal 1, PT) 30  -HW     Time Frame (Gait Training Goal 1, PT) long term goal (LTG);10 days  -HW     Progress/Outcome (Gait Training Goal 1, PT) new goal  -       Row Name 03/17/25 1323          Patient Education Goal (PT)    Activity (Patient Education Goal, PT) Pt will perform 1x10 LE TE with min A to allow for improved LE strength and endurance  -HW     [place]/Cues/Accuracy (Memory Goal 2, PT) demonstrates adequately  -HW     Time Frame (Patient Education Goal, PT) short term goal (STG);5 days  -HW     Progress/Outcome (Patient Education Goal, PT) new goal  -       Row Name 03/17/25 1323          Therapy Assessment/Plan (PT)    Planned Therapy Interventions (PT) balance training;bed mobility training;gait training;home exercise program;neuromuscular re-education;motor coordination training;postural re-education;strengthening;stretching;patient/family education;ROM (range of motion);transfer training  -               User Key  (r) = Recorded By, (t) = Taken By, (c) = Cosigned By      Initials Name Provider Type     Eusebia Livingston, PT Physical Therapist                   Clinical Impression       [facility] Name 03/17/25 1316          Pain    Additional Documentation Pain Scale: FACES Pre/Post-Treatment (Group)  -[facility] Name 03/17/25 1316          Pain Scale: FACES Pre/Post-Treatment    Pain: FACES Scale, Pretreatment 0-->no hurt  -     Posttreatment Pain Rating 0-->no hurt  -[facility] Name 03/17/25 1316          Plan of Care Review    Plan of Care Reviewed With patient  -     Progress no change  -     Outcome Evaluation Pt participated in PT  initial evaluation this date. Pt presents supine in bed, AO to person only. Pt is a poor historian, subjective history obtained from chart review and from pt's caregiver. At baseline, pt lives at [facility] with 24hr caregiver assistance. Per caregiver, pt is normally (I) with all household mobility without AD. Pt performed rolling R with max A x2 to assist with pericare to prior to OOB mobility, pt did not initiate mobility. Pt performed supine > sit on EOB with max A x2, required assistance to position LE's off EOB and position trunk upright. Pt performed STS with max A X2 with gait belt and HHA. Pt unable to achieve fully upright posture despite max VC/TC. Pt was unable to initiate gait despite multiple attempts. Pt returned to supine with max A x2. Following evaluation, pt left semi-fowlers position in bed with bed alarm active, call light and all needs within reach. Seizure precautions maintained. Recommend skilled PT intervention during IP admission to address identified deficits, reduce risk of falls and prevent functional decline. Once medically stable, recommend pt to d/c to [facility] with 24hr caregiver assistance.  -       Row Name 03/17/25 1316          Therapy Assessment/Plan (PT)    Patient/Family Therapy Goals Statement (PT) Pt unable to state goal  -     Rehab Potential (PT) fair  -     Criteria for Skilled Interventions Met (PT) yes  -     Therapy Frequency (PT) 3 times/wk  -     Predicted Duration of Therapy Intervention (PT) 10 days  -       Row Name 03/17/25 1316          Vital Signs    Intra Systolic BP Rehab 112  -HW     Intra Treatment Diastolic BP 75  -HW     Pretreatment Heart Rate (beats/min) 67  -HW     Pre SpO2 (%) 97  -HW     O2 Delivery Pre Treatment room air  -HW     O2 Delivery Intra Treatment room air  -HW     O2 Delivery Post Treatment room air  -HW     Pre Patient Position Supine  -HW     Intra Patient Position Standing  -HW     Post Patient Position Supine   -       Row Name 03/17/25 1316          Positioning and Restraints    Pre-Treatment Position in bed  -HW     Post Treatment Position bed  -HW     In Bed supine;call light within reach;encouraged to call for assist;exit alarm on  -               User Key  (r) = Recorded By, (t) = Taken By, (c) = Cosigned By      Initials Name Provider Type    Eusebia Aj PT Physical Therapist                   Outcome Measures       Row Name 03/17/25 1324          How much help from another person do you currently need...    Turning from your back to your side while in flat bed without using bedrails? 2  -HW     Moving from lying on back to sitting on the side of a flat bed without bedrails? 2  -HW     Moving to and from a bed to a chair (including a wheelchair)? 2  -HW     Standing up from a chair using your arms (e.g., wheelchair, bedside chair)? 2  -HW     Climbing 3-5 steps with a railing? 1  -HW     To walk in hospital room? 2  -HW     AM-PAC 6 Clicks Score (PT) 11  -     Highest Level of Mobility Goal 4 --> Transfer to chair/commode  -       Row Name 03/17/25 1324          Functional Assessment    Outcome Measure Options AM-PAC 6 Clicks Basic Mobility (PT)  -               User Key  (r) = Recorded By, (t) = Taken By, (c) = Cosigned By      Initials Name Provider Type    Eusebia Aj PT Physical Therapist                                 Physical Therapy Education       Title: PT OT SLP Therapies (In Progress)       Topic: Physical Therapy (In Progress)       Point: Mobility training (In Progress)       Learning Progress Summary            Patient Nonacceptance, E,TB, NL,NR by  at 3/17/2025 1324    Comment: role of PT during IP admission                      Point: Home exercise program (Not Started)       Learner Progress:  Not documented in this visit.              Point: Body mechanics (Not Started)       Learner Progress:  Not documented in this visit.              Point: Precautions (Not Started)        Learner Progress:  Not documented in this visit.                              User Key       Initials Effective Dates Name Provider Type Discipline     11/29/23 -  Eusebia Livingston PT Physical Therapist PT                  PT Recommendation and Plan  Planned Therapy Interventions (PT): balance training, bed mobility training, gait training, home exercise program, neuromuscular re-education, motor coordination training, postural re-education, strengthening, stretching, patient/family education, ROM (range of motion), transfer training  Progress: no change  Outcome Evaluation: Pt participated in PT initial evaluation this date. Pt presents supine in bed, AO to person only. Pt is a poor historian, subjective history obtained from chart review and from pt's caregiver. At baseline, pt lives at [facility] with 24hr caregiver assistance. Per caregiver, pt is normally (I) with all household mobility without AD. Pt performed rolling R with max A x2 to assist with pericare to prior to OOB mobility, pt did not initiate mobility. Pt performed supine > sit on EOB with max A x2, required assistance to position LE's off EOB and position trunk upright. Pt performed STS with max A X2 with gait belt and HHA. Pt unable to achieve fully upright posture despite max VC/TC. Pt was unable to initiate gait despite multiple attempts. Pt returned to supine with max A x2. Following evaluation, pt left semi-fowlers position in bed with bed alarm active, call light and all needs within reach. Seizure precautions maintained. Recommend skilled PT intervention during IP admission to address identified deficits, reduce risk of falls and prevent functional decline. Once medically stable, recommend pt to d/c to [facility] with 24hr caregiver assistance.     Time Calculation:   PT Evaluation Complexity  History, PT Evaluation Complexity: 3 or more personal factors and/or comorbidities  Examination of Body Systems (PT Eval Complexity): total of  3 or more elements  Clinical Presentation (PT Evaluation Complexity): evolving  Clinical Decision Making (PT Evaluation Complexity): moderate complexity  Overall Complexity (PT Evaluation Complexity): moderate complexity     PT Charges       Row Name 03/17/25 1325             Time Calculation    Start Time 1120  -HW      PT Received On 03/17/25  -HW      PT Goal Re-Cert Due Date 03/27/25  -         Untimed Charges    PT Eval/Re-eval Minutes 45  -HW         Total Minutes    Untimed Charges Total Minutes 45  -HW       Total Minutes 45  -HW                User Key  (r) = Recorded By, (t) = Taken By, (c) = Cosigned By      Initials Name Provider Type     Eusebia Livingston, PT Physical Therapist                  Therapy Charges for Today       Code Description Service Date Service Provider Modifiers Qty    21282393509 HC PT EVAL MOD COMPLEXITY 3 3/17/2025 Eusebia Livingston, PT GP 1            PT G-Codes  Outcome Measure Options: AM-PAC 6 Clicks Basic Mobility (PT)  AM-PAC 6 Clicks Score (PT): 11  PT Discharge Summary  Anticipated Discharge Disposition (PT): home with 24/7 care    Eusebia Livingston PT  3/17/2025

## 2025-03-17 NOTE — PLAN OF CARE
Goal Outcome Evaluation:  Plan of Care Reviewed With: patient        Progress: no change  Outcome Evaluation: minimal amount of yelling/crying out episodes today. No ativan or geodon given for behaviors. No significant events, continuing to monitor.

## 2025-03-17 NOTE — CASE MANAGEMENT/SOCIAL WORK
Case Management/Social Work    Patient Name:  Ave Correia  YOB: 1995  MRN: [id number]  Admit Date:  3/14/2025        09:21 EDT   Discharge Plan       Row Name 03/17/25 0921       Plan    Plan Patient to return to [facility] at discharge.                        Electronically signed by:  Alejandro Beltran RN  03/17/25 09:21 EDT

## 2025-03-17 NOTE — CONSULTS
Name:  Ave Correia    Age:  29 y.o.  Sex:  female  :  1995  MRN:  [id number]   Visit Number:  [id number]  Admission Date:  3/14/2025  Date Of Service:  03/15/25  Primary Care Physician:  Reyna Loyd APRN       Chief complaint:    Mental status changes and episodes of screaming and crying    History of present illness:    Ms. Correia is a 29 years old white female with history of chronic mental illness as well as multiple health problems including cerebral palsy hemiparesis of right dominant side as well as hypothyroidism who initially presented to the hospital with acute mental status changes and has been seen by neurology and has been cleared.  A psychiatric consultation was requested to review her medications and to address recurring episodes of screaming and crying in the middle of the night.  The patient remains nonverbal and has been seen by therapist but has not been able to communicate much and as such most of the cyclic psychiatric consultation was based off of chart review and review of the medical records and talking to the staff.  She is currently a resident of residential rehab facility and review the medical records indicate that she has been diagnosed with schizoaffective disorder and has been on combination psychotropic medications and though it appears that she has been taking the medications, she has been struggling to show much of a therapeutic response.  She has been having episodes of waking up in the middle of the night and screaming and yelling however, she has not shown any physical aggression and no documentation regarding suicidal ideations intent or plan was noticed the patient has been selectively mute and has had limited conversation.  It has also been noticed at work as she refuses to talk to the staff, she has been able to talk to some of the personnel from her residential facility which indicates that she is more comfortable talking to people that she is familiar with  and has lack of communication might just be selective mutism.  Given her long history of chronic mental illness and reviewing her medications and presenting features and symptoms I would recommend that we adjust and titrate her Abilify which is her mood stabilizer antipsychotic and should help her go through the night without any mood swings and irritability and screaming and yelling episode.  If patient still persists on those symptoms, further medication adjustment could be considered.  However, she does not meet criteria for acute inpatient psychiatric hospitalization and will recommend her going back to her residential facility which she is currently active with outpatient psychiatric treatment once she is medically stable.

## 2025-03-17 NOTE — PLAN OF CARE
Problem: Adult Inpatient Plan of Care  Goal: Absence of Hospital-Acquired Illness or Injury  Intervention: Identify and Manage Fall Risk  Recent Flowsheet Documentation  Taken 3/17/2025 0200 by Dulce Maria Adkins RN  Safety Promotion/Fall Prevention:   safety round/check completed   nonskid shoes/slippers when out of bed   assistive device/personal items within reach   clutter free environment maintained  Taken 3/17/2025 0000 by Dulce Maria Adkins RN  Safety Promotion/Fall Prevention:   safety round/check completed   nonskid shoes/slippers when out of bed   assistive device/personal items within reach   clutter free environment maintained  Taken 3/16/2025 2200 by Dulce Maria Adkins RN  Safety Promotion/Fall Prevention:   safety round/check completed   nonskid shoes/slippers when out of bed   assistive device/personal items within reach   clutter free environment maintained  Taken 3/16/2025 2000 by Dulce Maria Adkins RN  Safety Promotion/Fall Prevention:   safety round/check completed   nonskid shoes/slippers when out of bed   assistive device/personal items within reach   clutter free environment maintained  Intervention: Prevent Skin Injury  Recent Flowsheet Documentation  Taken 3/17/2025 0200 by Dulce Maria Adkins RN  Body Position:   tilted   left   legs elevated   head facing, left  Skin Protection: incontinence pads utilized  Taken 3/17/2025 0000 by Dulce Maria Adkins RN  Body Position:   supine   legs elevated  Skin Protection: incontinence pads utilized  Taken 3/16/2025 2200 by Dulce Maria Adkins RN  Body Position:   tilted   left   legs elevated  Skin Protection: incontinence pads utilized  Taken 3/16/2025 2000 by Dulce Maria Adkins RN  Body Position:   sitting up in bed   legs elevated  Skin Protection: incontinence pads utilized  Intervention: Prevent Infection  Recent Flowsheet Documentation  Taken 3/17/2025 0200 by Dulce Maria Adkins RN  Infection Prevention:   environmental surveillance  performed   rest/sleep promoted   single patient room provided  Taken 3/17/2025 0000 by Dulce Maria Adkins RN  Infection Prevention:   environmental surveillance performed   rest/sleep promoted   single patient room provided  Taken 3/16/2025 2200 by Dulce Maria Adkins RN  Infection Prevention:   environmental surveillance performed   rest/sleep promoted   single patient room provided  Taken 3/16/2025 2000 by Dulce Maria Adkins RN  Infection Prevention:   environmental surveillance performed   rest/sleep promoted   single patient room provided  Goal: Optimal Comfort and Wellbeing  Intervention: Monitor Pain and Promote Comfort  Recent Flowsheet Documentation  Taken 3/16/2025 2200 by Dulce Maria Adkins RN  Pain Management Interventions:   quiet environment facilitated   relaxation techniques promoted   therapeutic touch utilized   position adjusted   pillow support provided  Taken 3/16/2025 2000 by Dulce Maria Adkins RN  Pain Management Interventions:   quiet environment facilitated   relaxation techniques promoted  Intervention: Provide Person-Centered Care  Recent Flowsheet Documentation  Taken 3/16/2025 2000 by Dulce Maria Adkins RN  Trust Relationship/Rapport:   care explained   emotional support provided     Problem: Violence Risk or Actual  Goal: Anger and Impulse Control  Intervention: Minimize Safety Risk  Recent Flowsheet Documentation  Taken 3/17/2025 0200 by Dulce Maria Adkins RN  Enhanced Safety Measures: bed alarm set  Taken 3/17/2025 0000 by Dulce Maria Adkins RN  Enhanced Safety Measures: bed alarm set  Taken 3/16/2025 2200 by Dulce Maria Adkins RN  Enhanced Safety Measures: bed alarm set  Taken 3/16/2025 2000 by Dulce Maria Adkins RN  Sensory Stimulation Regulation: care clustered  Enhanced Safety Measures: bed alarm set  Intervention: Promote Self-Control  Recent Flowsheet Documentation  Taken 3/16/2025 2000 by Dulce Maria Adkins RN  Supportive Measures:   positive reinforcement provided    relaxation techniques promoted  Environmental Support: calm environment promoted     Problem: Skin Injury Risk Increased  Goal: Skin Health and Integrity  Intervention: Optimize Skin Protection  Recent Flowsheet Documentation  Taken 3/17/2025 0200 by Dulce Maria Adkins RN  Activity Management: activity minimized  Pressure Reduction Techniques: frequent weight shift encouraged  Head of Bed (HOB) Positioning: HOB at 30-45 degrees  Pressure Reduction Devices: positioning supports utilized  Skin Protection: incontinence pads utilized  Taken 3/17/2025 0000 by Dulce Maria Adkins RN  Activity Management: activity minimized  Pressure Reduction Techniques: frequent weight shift encouraged  Head of Bed (HOB) Positioning: HOB at 30-45 degrees  Pressure Reduction Devices: positioning supports utilized  Skin Protection: incontinence pads utilized  Taken 3/16/2025 2200 by Dulce Maria Adkins RN  Activity Management: activity minimized  Pressure Reduction Techniques: frequent weight shift encouraged  Head of Bed (HOB) Positioning: HOB at 30-45 degrees  Pressure Reduction Devices: positioning supports utilized  Skin Protection: incontinence pads utilized  Taken 3/16/2025 2000 by Dulce Maria Adkins RN  Activity Management: activity minimized  Pressure Reduction Techniques: frequent weight shift encouraged  Head of Bed (HOB) Positioning: HOB at 30-45 degrees  Pressure Reduction Devices: positioning supports utilized  Skin Protection: incontinence pads utilized     Problem: Comorbidity Management  Goal: Maintenance of Asthma Control  Intervention: Maintain Asthma Symptom Control  Recent Flowsheet Documentation  Taken 3/16/2025 2000 by Dulce Maria Adkins RN  Medication Review/Management: medications reviewed  Goal: Maintenance of Behavioral Health Symptom Control  Intervention: Maintain Behavioral Health Symptom Control  Recent Flowsheet Documentation  Taken 3/16/2025 2000 by Dulce Maria Adkins RN  Medication Review/Management:  medications reviewed  Goal: Maintenance of COPD Symptom Control  Intervention: Maintain COPD (Chronic Obstructive Pulmonary Disease) Symptom Control  Recent Flowsheet Documentation  Taken 3/16/2025 2000 by Dulce Maria Adkins RN  Medication Review/Management: medications reviewed  Goal: Blood Glucose Level Within Target Range  Intervention: Monitor and Manage Glycemia  Recent Flowsheet Documentation  Taken 3/16/2025 2000 by Dulce Maria Adkins RN  Medication Review/Management: medications reviewed  Goal: Maintenance of Heart Failure Symptom Control  Intervention: Maintain Heart Failure Management  Recent Flowsheet Documentation  Taken 3/16/2025 2000 by Dulce Maria Adkins RN  Medication Review/Management: medications reviewed  Goal: Blood Pressure in Desired Range  Intervention: Maintain Blood Pressure Management  Recent Flowsheet Documentation  Taken 3/16/2025 2000 by Dulce Maria Adkins RN  Medication Review/Management: medications reviewed  Goal: Maintenance of Osteoarthritis Symptom Control  Intervention: Maintain Osteoarthritis Symptom Control  Recent Flowsheet Documentation  Taken 3/17/2025 0200 by Dulce Maria Adkins RN  Activity Management: activity minimized  Taken 3/17/2025 0000 by Dulce Maria Adkins RN  Activity Management: activity minimized  Taken 3/16/2025 2200 by Dulce Maria Adkins RN  Activity Management: activity minimized  Taken 3/16/2025 2000 by Dulce Maria Adkins RN  Activity Management: activity minimized  Medication Review/Management: medications reviewed  Goal: Bariatric Home Regimen Maintained  Intervention: Maintain and Manage Postbariatric Surgery Care  Recent Flowsheet Documentation  Taken 3/16/2025 2000 by Dulce Maria Adkins RN  Medication Review/Management: medications reviewed  Goal: Maintenance of Seizure Control  Intervention: Maintain Seizure Symptom Control  Recent Flowsheet Documentation  Taken 3/16/2025 2000 by Dulce Maria Adkins RN  Sensory Stimulation Regulation: care  clustered  Medication Review/Management: medications reviewed     Problem: Fall Injury Risk  Goal: Absence of Fall and Fall-Related Injury  Intervention: Identify and Manage Contributors  Recent Flowsheet Documentation  Taken 3/16/2025 2000 by Dulce Maria Adkins RN  Medication Review/Management: medications reviewed  Intervention: Promote Injury-Free Environment  Recent Flowsheet Documentation  Taken 3/17/2025 0200 by Dulce Maria Adkins RN  Safety Promotion/Fall Prevention:   safety round/check completed   nonskid shoes/slippers when out of bed   assistive device/personal items within reach   clutter free environment maintained  Taken 3/17/2025 0000 by Dulce Maria Adkins RN  Safety Promotion/Fall Prevention:   safety round/check completed   nonskid shoes/slippers when out of bed   assistive device/personal items within reach   clutter free environment maintained  Taken 3/16/2025 2200 by Dulce Maria Adkins RN  Safety Promotion/Fall Prevention:   safety round/check completed   nonskid shoes/slippers when out of bed   assistive device/personal items within reach   clutter free environment maintained  Taken 3/16/2025 2000 by Dulce Maria Adkins RN  Safety Promotion/Fall Prevention:   safety round/check completed   nonskid shoes/slippers when out of bed   assistive device/personal items within reach   clutter free environment maintained   Goal Outcome Evaluation:         VSS on RA.  Patient had multiple episodes of crying out and was inconsolable during the night, refusing her oral medications.  Anxiety IV medicine given PRN.  IV fluids continued per MAR.  No acute events during this time.  POC ongoing.

## 2025-03-17 NOTE — PLAN OF CARE
Goal Outcome Evaluation:  Plan of Care Reviewed With: patient        Progress: no change  Outcome Evaluation: Pt participated in PT initial evaluation this date. Pt presents supine in bed, AO to person only. Pt is a poor historian, subjective history obtained from chart review and from pt's caregiver. At baseline, pt lives at [facility] with 24hr caregiver assistance. Per caregiver, pt is normally (I) with all household mobility without AD. Pt performed rolling R with max A x2 to assist with pericare to prior to OOB mobility, pt did not initiate mobility. Pt performed supine > sit on EOB with max A x2, required assistance to position LE's off EOB and position trunk upright. Pt performed STS with max A X2 with gait belt and HHA. Pt unable to achieve fully upright posture despite max VC/TC. Pt was unable to initiate gait despite multiple attempts. Pt returned to supine with max A x2. Following evaluation, pt left semi-fowlers position in bed with bed alarm active, call light and all needs within reach. Seizure precautions maintained. Recommend skilled PT intervention during IP admission to address identified deficits, reduce risk of falls and prevent functional decline. Once medically stable, recommend pt to d/c to [facility] with 24hr caregiver assistance.    Anticipated Discharge Disposition (PT): home with 24/7 care

## 2025-03-17 NOTE — PROGRESS NOTES
"      [facility]    Name:  Ave Correia   Age:  29 y.o.  Sex:  female  :  1995  MRN:  [id number]   Visit Number:  [id number]  Admission Date:  3/14/2025  Date Of Service:  25  Primary Care Physician:  Reyna Loyd APRN     LOS: 1 day :    Chief Complaint:      Follow-up of altered mental status.    Subjective:    Ave Correia was seen and examined this morning.  She is more alert today compared to yesterday but she is still nonverbal.  She did receive Ativan and Geodon again last night.  She is scheduled to see psychiatrist today through telemedicine.  She is a resident of [facility].    Hospital Course:    Ave Correia is a 28 yo female with a PMH of cerebral palsy, hemiparesis of right dominant side due to non-cerebral vascular etiology, eizures, anxiety and hypothyroidism presenting with altered mental status.  Of note, she is currently staying at a residential program and one of the staff members is at the bedside explaining what has occurred.  It appears that the patient at baseline can have full conversation although her speech is a little impaired and sometimes difficult to understand.  She is also able to ambulate despite having some difficulties due to her left hemiparesis.  It appears that around 3 AM this morning, she woke up screaming \"I hate you\", \"I will kill you\" until about 8 AM.  She went back to her baseline until around 12:30 when she fell to the floor.  The patient then became combative, and refused to get up. Staff did notice that her urine had a foul smell and looked dark.  EMS was called and she was found to be hypotensive and brought to our ED.  At time of admission, the patient was not following commands, and is groaning.      In ED, blood pressure 66/41, heart rate 75, respiratory rate of 16, temperature 97 O2 saturation 95% on room air.  Labs on arrival showed a sodium 134, potassium 4, BUN 15, " creatinine 0.6, WBC 4, hemoglobin 11.7, and platelet count 81.  Patient's random valproic acid over 150.  Chest x-ray showed no acute infiltrates.  CT head showed no acute findings.  The patient was started on IV fluids and her blood pressure has improved to 101/73.  She was subsequently admitted to the medical floor with telemetry for further evaluation.    Patient was seen by Dr. Jackson from telemetry neurology.  He recommended to discontinue Keppra due to her drowsiness and recommended to continue Depakote but change formulation to sprinkles and recommended to use clobazam only at night and not during the daytime.  Neurology also recommended not to use lamotrigine along with Depakote as this can cause serious set up for Samir Ronald syndrome.  Neurology recommended psychiatric evaluation for schizoaffective disorder and adjustment of medications.    Review of Systems:     All systems were reviewed and negative except as mentioned in subjective, assessment and plan.    Vital Signs:    Temp:  [97.3 °F (36.3 °C)-98.1 °F (36.7 °C)] 97.3 °F (36.3 °C)  Heart Rate:  [66-88] 71  Resp:  [14-18] 16  BP: ()/(53-96) 118/96    Intake and output:    I/O last 3 completed shifts:  In: 3743.8 [P.O.:660; I.V.:3083.8]  Out: 400 [Urine:400]  No intake/output data recorded.    Physical Examination:    General Appearance:  Alert and cooperative.    Head:  Atraumatic and normocephalic.  Contracture of the neck noted.   Eyes: Conjunctivae and sclerae normal, no icterus. No pallor.   Throat: No oral lesions, no thrush, oral mucosa moist.   Neck: Supple, trachea midline, no thyromegaly.   Lungs:   Breath sounds heard bilaterally equally.  No wheezing or crackles. No Pleural rub or bronchial breathing.   Heart:  Normal S1 and S2, no murmur, no gallop, no rub. No JVD.   Abdomen:   Normal bowel sounds, no masses, no organomegaly. Soft, nontender, nondistended, no rebound tenderness.   Extremities: Supple, 2+ pitting ankle and leg  edema, no cyanosis, no clubbing.  Bilateral and upper and lower extremity contractures noted.   Skin: No bleeding or rash.   Neurologic: Alert on call but nonverbal at this time. No facial asymmetry. Moves all four limbs but does have generalized weakness. No tremors.    Laboratory results:    Results from last 7 days   Lab Units 03/16/25  0534 03/15/25  0606 03/14/25  1255   SODIUM mmol/L 138 138 134*   POTASSIUM mmol/L 4.0 4.7 4.0   CHLORIDE mmol/L 104 104 98   CO2 mmol/L 24.2 28.2 26.8   BUN mg/dL 9 11 15   CREATININE mg/dL 0.52* 0.64 0.68   CALCIUM mg/dL 8.3* 7.9* 8.9   BILIRUBIN mg/dL  --   --  <0.2   ALK PHOS U/L  --   --  51   ALT (SGPT) U/L  --   --  16   AST (SGOT) U/L  --   --  28   GLUCOSE mg/dL 77 68 112*     Results from last 7 days   Lab Units 03/16/25  0534 03/15/25  0605 03/14/25  1255   WBC 10*3/mm3 3.46 4.14 4.79   HEMOGLOBIN g/dL 10.6* 9.7* 11.7*   HEMATOCRIT % 31.1* 29.2* 34.0   PLATELETS 10*3/mm3 60* 66* 81*       Results from last 7 days   Lab Units 03/14/25  1612 03/14/25  1255   HSTROP T ng/L 17* 20*       I have reviewed the patient's laboratory results.    Radiology results:    No radiology results from the last 24 hrs    I have reviewed the patient's radiology reports.    Medication Review:     I have reviewed the patient's active and prn medications.     Problem List:      Altered mental status    Cerebral palsy    Seizure disorder    Assessment:    Acute metabolic encephalopathy, POA.  Hypovolemic hypotension, POA, improved.  Suspected valproic acid toxicity, POA.  Cerebral palsy.  Seizure disorder.  Hypothyroidism.  Chronic thrombocytopenia.    Plan:    Altered mental status/metabolic encephalopathy.  - Exact etiology of this is uncertain but could be related to medications, sepsis and hypotension.  - Patient was seen by Dr. Jackson from neurology.  At this time there is no evidence of seizures and patient may have underlying behavioral issues.  - He recommended to discontinue Keppra  but continue Depakote in the sprinkle form so the patient can swallow better.  He recommended to use the clobazam only at night.  He also recommended to discontinue lamotrigine as it can cause high risk for Weeks-Ronald syndrome when used concurrently with Depakote.  - Behavioral consult from psychiatrist was ordered.    Suspected valproic acid toxicity.  - Patient's valproic acid level done yesterday was random and it can be elevated.  Repeat test done this morning was within normal range.  - Repeat valproic acid level have remained low.    Hypovolemic hypotension.  - Improved with IV hydration.  - No evidence of sepsis at this time as she has not been febrile and does not have leukocytosis.  - Urine analysis was fairly unremarkable and chest x-ray showed no infiltrate.  Respiratory panel was negative.    Chronic thrombocytopenia.  - Exact etiology of this is uncertain but has been going on at least 2022 as per chart.  - Level is stable around 60.    I tried to call the patient's legal guardian April over the phone but unfortunately there was no response.  I also tried to call the patient's caregiver Dawn but unfortunately there was no response.    Discussed with nursing staff at the bedside and multidisciplinary team.    I have reviewed the copied text and it is accurate as of 03/17/25    DVT Prophylaxis: SCDs (Lovenox discontinued due to thrombocytopenia)  Code Status: Full  Diet: Regular  Discharge Plan: Hopefully, patient should be able to go back to group home facility tomorrow.    Tate Ojeda MD  03/17/25  13:36 EDT    Dictated utilizing Dragon dictation.

## 2025-03-17 NOTE — THERAPY EVALUATION
Patient Name: Ave Correia  : 1995    MRN: [id number]                              Today's Date: 3/17/2025       Admit Date: 3/14/2025    Visit Dx:     ICD-10-CM ICD-9-CM   1. [place] coma scale total score 9-12, at arrival to emergency department  R40.2422 780.01   2. Hypotension due to hypovolemia  E86.1 458.8     276.52     Patient Active Problem List   Diagnosis    Blind left eye    Altered mental status    Cerebral palsy    Seizure disorder     Past Medical History:   Diagnosis Date    Blind right eye     Cerebral palsy     Hemiparesis of right dominant side due to non-cerebrovascular etiology     Impaired mobility     Seizures      Past Surgical History:   Procedure Laterality Date    DENTAL EXAMINATION UNDER ANESTHESIA W/ CLEANING AND XRAYS      Fillings placed.    IMPLANTATION VAGAL NERVE STIMULATOR        General Information       Row Name 25 1257          OT Time and Intention    Subjective Information no complaints  -     Document Type evaluation  -     Mode of Treatment occupational therapy  -     Patient Effort adequate  -       Row Name 25 1257          General Information    Patient Profile Reviewed yes  -     Prior Level of Function min assist:;ADL's;independent:;all household mobility  per caregiver  -     Existing Precautions/Restrictions fall;seizures  -     Barriers to Rehab medically complex;previous functional deficit;cognitive status  -       Row Name 25 1257          Occupational Profile    Reason for Services/Referral (Occupational Profile) ADL decline  -       Row Name 25 1257          Living Environment    Current Living Arrangements group home  -     People in Home facility resident  -       Row Name 25 1257          Cognition    Orientation Status (Cognition) oriented to;person  -       Row Name 25 1257          Safety Issues/Impairments Affecting Functional Mobility    Safety Issues Affecting Function (Mobility) ability  to follow commands;safety precaution awareness;safety precautions follow-through/compliance;judgment  -     Impairments Affecting Function (Mobility) balance;endurance/activity tolerance;grasp;range of motion (ROM)  -               User Key  (r) = Recorded By, (t) = Taken By, (c) = Cosigned By      Initials Name Provider Type     Daysi Decker Occupational Therapist                     Mobility/ADL's       Row Name 03/17/25 1309          Bed Mobility    Bed Mobility rolling left;rolling right;supine-sit;sit-supine  -     Rolling Left Lottie (Bed Mobility) maximum assist (25% patient effort)  -     Rolling Right Lottie (Bed Mobility) maximum assist (25% patient effort)  -     Supine-Sit Lottie (Bed Mobility) maximum assist (25% patient effort);2 person assist  -     Sit-Supine Lottie (Bed Mobility) maximum assist (25% patient effort);2 person assist  -     Bed Mobility, Safety Issues decreased use of arms for pushing/pulling;decreased use of legs for bridging/pushing  -     Comment, (Bed Mobility) pt able to sit unsupported at bedside  -       Row Name 03/17/25 1309          Transfers    Transfers sit-stand transfer  -[facility] Name 03/17/25 1309          Sit-Stand Transfer    Sit-Stand Lottie (Transfers) maximum assist (25% patient effort);2 person assist  -     Assistive Device (Sit-Stand Transfers) other (see comments)  -     Comment, (Sit-Stand Transfer) HHA and gait belt  -       Row Name 03/17/25 1309          Functional Mobility    Patient was able to Ambulate no, other medical factors prevent ambulation  -     Reason Patient was unable to Ambulate Cognitive Deficit/Severe Dementia  -       Row Name 03/17/25 1309          Activities of Daily Living    BADL Assessment/Intervention bathing;upper body dressing;lower body dressing;grooming;feeding;toileting  -       Row Name 03/17/25 1309          Bathing Assessment/Intervention    Lottie  Level (Bathing) maximum assist (25% patient effort)  -[facility] Name 03/17/25 1309          Upper Body Dressing Assessment/Training    [place] Level (Upper Body Dressing) maximum assist (25% patient effort)  -[facility] Name 03/17/25 1309          Lower Body Dressing Assessment/Training    [place] Level (Lower Body Dressing) dependent (less than 25% patient effort)  -[facility] Name 03/17/25 1309          Grooming Assessment/Training    [place] Level (Grooming) maximum assist (25% patient effort)  -     Comment, (Grooming) Hand over hand to initiate washing her face and brushing her hair, max assist to make sure task is completed thoroughly  -[facility] Name 03/17/25 1309          Self-Feeding Assessment/Training    [place] Level (Feeding) set up  -     Comment, (Feeding) per PCT, pt able to feed herself without difficulty  -AH       Row Name 03/17/25 1309          Toileting Assessment/Training    [place] Level (Toileting) maximum assist (25% patient effort)  -               User Key  (r) = Recorded By, (t) = Taken By, (c) = Cosigned By      Initials Name Provider Type    Daysi Krishna Occupational Therapist                   Obj/Interventions       Row Name 03/17/25 1315          Vision Assessment/Intervention    Visual Impairment/Limitations legally blind  -     Vision Assessment Comment per chart review, pt is legally blind in her R eye  -AH       Row Name 03/17/25 1315          Range of Motion Comprehensive    Comment, General Range of Motion LUE has flexion contractures, RUE WFL  -AH       Row Name 03/17/25 1315          Strength Comprehensive (MMT)    Comment, General Manual Muscle Testing (MMT) Assessment MMT not performed  -               User Key  (r) = Recorded By, (t) = Taken By, (c) = Cosigned By      Initials Name Provider Type    Daysi Krishna Occupational Therapist                   Goals/Plan       [facility] Name 03/17/25 1329          Bed Mobility Goal 1  (OT)    Activity/Assistive Device (Bed Mobility Goal 1, OT) bed mobility activities, all  -     [place] Level/Cues Needed (Bed Mobility Goal 1, OT) contact guard required  -     Time Frame (Bed Mobility Goal 1, OT) by discharge  -     Progress/Outcomes (Bed Mobility Goal 1, OT) goal ongoing  -       Row Name 03/17/25 1329          Transfer Goal 1 (OT)    Activity/Assistive Device (Transfer Goal 1, OT) sit-to-stand/stand-to-sit  -     [place] Level/Cues Needed (Transfer Goal 1, OT) minimum assist (75% or more patient effort)  -AH     Time Frame (Transfer Goal 1, OT) by discharge  -     Progress/Outcome (Transfer Goal 1, OT) goal ongoing  -       Row Name 03/17/25 1329          Bathing Goal 1 (OT)    Activity/Device (Bathing Goal 1, OT) bathing skills, all  -     [place] Level/Cues Needed (Bathing Goal 1, OT) minimum assist (75% or more patient effort)  -     Time Frame (Bathing Goal 1, OT) long term goal (LTG);1 week  -     Progress/Outcomes (Bathing Goal 1, OT) goal ongoing  -[facility] Name 03/17/25 1329          Dressing Goal 1 (OT)    Activity/Device (Dressing Goal 1, OT) dressing skills, all  -     [place]/Cues Needed (Dressing Goal 1, OT) minimum assist (75% or more patient effort)  -     Time Frame (Dressing Goal 1, OT) long term goal (LTG);1 week  -     Progress/Outcome (Dressing Goal 1, OT) goal ongoing  -       Row Name 03/17/25 1329          Toileting Goal 1 (OT)    Activity/Device (Toileting Goal 1, OT) adjust/manage clothing;perform perineal hygiene  -     [place] Level/Cues Needed (Toileting Goal 1, OT) minimum assist (75% or more patient effort)  -     Time Frame (Toileting Goal 1, OT) by discharge  -     Progress/Outcome (Toileting Goal 1, OT) goal ongoing  -[facility] Name 03/17/25 1329          Grooming Goal 1 (OT)    Activity/Device (Grooming Goal 1, OT) grooming skills, all  -     [place] (Grooming Goal 1, OT) set-up required   -     Time Frame (Grooming Goal 1, OT) by discharge  -     Progress/Outcome (Grooming Goal 1, OT) goal ongoing  -       Row Name 03/17/25 1329          Therapy Assessment/Plan (OT)    Planned Therapy Interventions (OT) activity tolerance training;BADL retraining;patient/caregiver education/training;transfer/mobility retraining  -               User Key  (r) = Recorded By, (t) = Taken By, (c) = Cosigned By      Initials Name Provider Type     Daysi Decker Occupational Therapist                   Clinical Impression       Row Name 03/17/25 1317          Pain Assessment    Additional Documentation Pain Scale: FACES Pre/Post-Treatment (Group)  -       Row Name 03/17/25 1317          Pain Scale: FACES Pre/Post-Treatment    Pain: FACES Scale, Pretreatment 0-->no hurt  -     Posttreatment Pain Rating 0-->no hurt  -       Row Name 03/17/25 1317          Plan of Care Review    Plan of Care Reviewed With patient  -     Progress no change  -     Outcome Evaluation Pt seen for OT evaluation today.  Pt received supine in bed, no staff from [facility] present.  Pt able to state her name and birthday, but did not say really anything more during the evaluation.  Pt max assist to roll in bed to get her depends changed and her cleaned up.  Pt max assist of 2 to sit eob, but was able to sit unsupported demonstrating good balance.  Pt partially stood with max assist of 2, but did not take any steps.  pt was max assist of 2 to lay back down in bed.  After evaluation, OT called staff at the [facility] to determine pts PLOF.  Per Caroline, her caregiver, pt is able to bathe with min assist, dresses herself independently and walks independently in her home.  Pt is able to go to the bathroom independently with occasional assistance for thorough cleaning.  Pt is expected to benefit from skilled OT to improve her independence with ADL and functional mobility tasks.  -       Row Name 03/17/25 2988           Therapy Assessment/Plan (OT)    Patient/Family Therapy Goal Statement (OT) d/c back to group home  -     Rehab Potential (OT) good  -     Criteria for Skilled Therapeutic Interventions Met (OT) yes;skilled treatment is necessary  [facility]     Therapy Frequency (OT) 3 times/wk  -       Row Name 03/17/25 1317          Therapy Plan Review/Discharge Plan (OT)    Anticipated Discharge Disposition (OT) adult foster care/group home  -       Row Name 03/17/25 1317          Positioning and Restraints    Pre-Treatment Position in bed  -     Post Treatment Position bed  -     In Bed supine;call light within reach;encouraged to call for assist;exit alarm on  -               User Key  (r) = Recorded By, (t) = Taken By, (c) = Cosigned By      Initials Name Provider Type    Daysi Krishna Occupational Therapist                   Outcome Measures       Row Name 03/17/25 1330          How much help from another is currently needed...    Putting on and taking off regular lower body clothing? 2  -AH     Bathing (including washing, rinsing, and drying) 2  -AH     Toileting (which includes using toilet bed pan or urinal) 2  -AH     Putting on and taking off regular upper body clothing 2  -AH     Taking care of personal grooming (such as brushing teeth) 2  -AH     Eating meals 3  -     AM-PAC 6 Clicks Score (OT) 13  -       Row Name 03/17/25 1324          How much help from another person do you currently need...    Turning from your back to your side while in flat bed without using bedrails? 2  -HW     Moving from lying on back to sitting on the side of a flat bed without bedrails? 2  -HW     Moving to and from a bed to a chair (including a wheelchair)? 2  -HW     Standing up from a chair using your arms (e.g., wheelchair, bedside chair)? 2  -HW     Climbing 3-5 steps with a railing? 1  -HW     To walk in hospital room? 2  -HW     AM-PAC 6 Clicks Score (PT) 11  -HW     Highest Level of Mobility Goal 4 --> Transfer to  chair/commode  -       Row Name 03/17/25 1330 03/17/25 1324       Functional Assessment    Outcome Measure Options AM-PAC 6 Clicks Daily Activity (OT)  - AM-PAC 6 Clicks Basic Mobility (PT)  -              User Key  (r) = Recorded By, (t) = Taken By, (c) = Cosigned By      Initials Name Provider Type     Daysi Decker Occupational Therapist     Eusebia Livingston PT Physical Therapist                    Occupational Therapy Education       Title: PT OT SLP Therapies (In Progress)       Topic: Occupational Therapy (In Progress)       Point: ADL training (Done)       Learning Progress Summary            Patient Acceptance, E,TB, VU by  at 3/17/2025 1331    Comment: Role of OT/POC                                      User Key       Initials Effective Dates Name Provider Type Discipline     06/16/21 -  Daysi Decker Occupational Therapist OT                  OT Recommendation and Plan  Planned Therapy Interventions (OT): activity tolerance training, BADL retraining, patient/caregiver education/training, transfer/mobility retraining  Therapy Frequency (OT): 3 times/wk  Plan of Care Review  Plan of Care Reviewed With: patient  Progress: no change  Outcome Evaluation: Pt seen for OT evaluation today.  Pt received supine in bed, no staff from [facility] present.  Pt able to state her name and birthday, but did not say really anything more during the evaluation.  Pt max assist to roll in bed to get her depends changed and her cleaned up.  Pt max assist of 2 to sit eob, but was able to sit unsupported demonstrating good balance.  Pt partially stood with max assist of 2, but did not take any steps.  pt was max assist of 2 to lay back down in bed.  After evaluation, OT called staff at the [facility] to determine pts PLOF.  Per Caroline, her caregiver, pt is able to bathe with min assist, dresses herself independently and walks independently in her home.  Pt is able to go to the bathroom independently with  occasional assistance for thorough cleaning.  Pt is expected to benefit from skilled OT to improve her independence with ADL and functional mobility tasks.     Time Calculation:   Evaluation Complexity (OT)  Review Occupational Profile/Medical/Therapy History Complexity: expanded/moderate complexity  Assessment, Occupational Performance/Identification of Deficit Complexity: 3-5 performance deficits  Clinical Decision Making Complexity (OT): detailed assessment/moderate complexity  Overall Complexity of Evaluation (OT): moderate complexity     Time Calculation- OT       Row Name 03/17/25 1331             Time Calculation- OT    OT Start Time 1119  -AH      OT Received On 03/17/25  -      OT Goal Re-Cert Due Date 03/27/25  -         Untimed Charges    OT Eval/Re-eval Minutes 53  -AH         Total Minutes    Untimed Charges Total Minutes 53  -AH       Total Minutes 53  -AH                User Key  (r) = Recorded By, (t) = Taken By, (c) = Cosigned By      Initials Name Provider Type    Daysi Krishna Occupational Therapist                  Therapy Charges for Today       Code Description Service Date Service Provider Modifiers Qty    [id number] HC OT EVAL MOD COMPLEXITY 3 3/17/2025 Daysi Decker GO 1                 Daysi Decker  3/17/2025

## 2025-03-18 LAB
ALBUMIN SERPL-MCNC: 3.5 G/DL (ref 3.5–5.2)
ALBUMIN/GLOB SERPL: 1.4 G/DL
ALP SERPL-CCNC: 48 U/L (ref 39–117)
ALT SERPL W P-5'-P-CCNC: 12 U/L (ref 1–33)
ANION GAP SERPL CALCULATED.3IONS-SCNC: 11 MMOL/L (ref 5–15)
AST SERPL-CCNC: 18 U/L (ref 1–32)
BILIRUB SERPL-MCNC: <0.2 MG/DL (ref 0–1.2)
BUN SERPL-MCNC: 5 MG/DL (ref 6–20)
BUN/CREAT SERPL: 7.5 (ref 7–25)
CALCIUM SPEC-SCNC: 8.9 MG/DL (ref 8.6–10.5)
CHLORIDE SERPL-SCNC: 99 MMOL/L (ref 98–107)
CO2 SERPL-SCNC: 23 MMOL/L (ref 22–29)
CREAT SERPL-MCNC: 0.67 MG/DL (ref 0.57–1)
DEPRECATED RDW RBC AUTO: 47.3 FL (ref 37–54)
EGFRCR SERPLBLD CKD-EPI 2021: 121.5 ML/MIN/1.73
ERYTHROCYTE [DISTWIDTH] IN BLOOD BY AUTOMATED COUNT: 13.4 % (ref 12.3–15.4)
GLOBULIN UR ELPH-MCNC: 2.5 GM/DL
GLUCOSE SERPL-MCNC: 94 MG/DL (ref 65–99)
HCT VFR BLD AUTO: 32 % (ref 34–46.6)
HGB BLD-MCNC: 11 G/DL (ref 12–15.9)
MCH RBC QN AUTO: 33 PG (ref 26.6–33)
MCHC RBC AUTO-ENTMCNC: 34.4 G/DL (ref 31.5–35.7)
MCV RBC AUTO: 96.1 FL (ref 79–97)
PLATELET # BLD AUTO: 32 10*3/MM3 (ref 140–450)
PMV BLD AUTO: 11.7 FL (ref 6–12)
POTASSIUM SERPL-SCNC: 3.9 MMOL/L (ref 3.5–5.2)
PROT SERPL-MCNC: 6 G/DL (ref 6–8.5)
RBC # BLD AUTO: 3.33 10*6/MM3 (ref 3.77–5.28)
REF LAB TEST METHOD: NORMAL
SODIUM SERPL-SCNC: 133 MMOL/L (ref 136–145)
WBC NRBC COR # BLD AUTO: 6.4 10*3/MM3 (ref 3.4–10.8)

## 2025-03-18 PROCEDURE — 93010 ELECTROCARDIOGRAM REPORT: CPT | Performed by: INTERNAL MEDICINE

## 2025-03-18 PROCEDURE — 99232 SBSQ HOSP IP/OBS MODERATE 35: CPT | Performed by: STUDENT IN AN ORGANIZED HEALTH CARE EDUCATION/TRAINING PROGRAM

## 2025-03-18 PROCEDURE — 80053 COMPREHEN METABOLIC PANEL: CPT | Performed by: INTERNAL MEDICINE

## 2025-03-18 PROCEDURE — 25010000002 LORAZEPAM PER 2 MG: Performed by: FAMILY MEDICINE

## 2025-03-18 PROCEDURE — 85027 COMPLETE CBC AUTOMATED: CPT | Performed by: INTERNAL MEDICINE

## 2025-03-18 PROCEDURE — 93005 ELECTROCARDIOGRAM TRACING: CPT | Performed by: STUDENT IN AN ORGANIZED HEALTH CARE EDUCATION/TRAINING PROGRAM

## 2025-03-18 RX ADMIN — ARIPIPRAZOLE 10 MG: 5 TABLET ORAL at 20:29

## 2025-03-18 RX ADMIN — DIVALPROEX SODIUM 250 MG: 125 CAPSULE ORAL at 20:29

## 2025-03-18 RX ADMIN — ARIPIPRAZOLE 10 MG: 5 TABLET ORAL at 09:21

## 2025-03-18 RX ADMIN — LORAZEPAM 1 MG: 2 INJECTION INTRAMUSCULAR; INTRAVENOUS at 20:48

## 2025-03-18 RX ADMIN — LEVOTHYROXINE SODIUM 75 MCG: 0.07 TABLET ORAL at 09:22

## 2025-03-18 RX ADMIN — Medication 10 ML: at 09:29

## 2025-03-18 RX ADMIN — PANTOPRAZOLE SODIUM 40 MG: 40 TABLET, DELAYED RELEASE ORAL at 09:22

## 2025-03-18 RX ADMIN — CLOBAZAM 20 MG: 10 TABLET ORAL at 18:12

## 2025-03-18 RX ADMIN — LORAZEPAM 1 MG: 2 INJECTION INTRAMUSCULAR; INTRAVENOUS at 12:40

## 2025-03-18 RX ADMIN — DIVALPROEX SODIUM 250 MG: 125 CAPSULE ORAL at 09:21

## 2025-03-18 RX ADMIN — Medication 10 ML: at 20:33

## 2025-03-18 RX ADMIN — LORAZEPAM 1 MG: 2 INJECTION INTRAMUSCULAR; INTRAVENOUS at 03:51

## 2025-03-18 NOTE — PLAN OF CARE
Problem: Adult Inpatient Plan of Care  Goal: Plan of Care Review  Outcome: Progressing  Goal: Patient-Specific Goal (Individualized)  Outcome: Progressing  Goal: Absence of Hospital-Acquired Illness or Injury  Outcome: Progressing  Intervention: Identify and Manage Fall Risk  Recent Flowsheet Documentation  Taken 3/18/2025 0200 by Dulce Maria Adkins RN  Safety Promotion/Fall Prevention:   safety round/check completed   nonskid shoes/slippers when out of bed   assistive device/personal items within reach   clutter free environment maintained  Taken 3/18/2025 0000 by Dulce Maria Adkins RN  Safety Promotion/Fall Prevention:   safety round/check completed   nonskid shoes/slippers when out of bed   assistive device/personal items within reach   clutter free environment maintained  Taken 3/17/2025 2200 by Dulce Maria Adkins RN  Safety Promotion/Fall Prevention:   safety round/check completed   nonskid shoes/slippers when out of bed   assistive device/personal items within reach   clutter free environment maintained  Taken 3/17/2025 2000 by Dulce Maria Adkins RN  Safety Promotion/Fall Prevention:   safety round/check completed   nonskid shoes/slippers when out of bed   assistive device/personal items within reach   clutter free environment maintained  Intervention: Prevent Skin Injury  Recent Flowsheet Documentation  Taken 3/18/2025 0200 by Dulce Maria Adkins RN  Body Position:   tilted   right   legs elevated  Skin Protection: incontinence pads utilized  Taken 3/18/2025 0000 by Dulce Maria dAkins RN  Body Position:   sitting up in bed   legs elevated  Skin Protection: incontinence pads utilized  Taken 3/17/2025 2200 by Dulce Maria Adkins RN  Body Position:   position maintained   patient/family refused  Skin Protection: incontinence pads utilized  Taken 3/17/2025 2000 by Dulce Maria Adkins RN  Body Position:   supine   legs elevated  Skin Protection: incontinence pads utilized  Intervention: Prevent  Infection  Recent Flowsheet Documentation  Taken 3/18/2025 0200 by Dulce Maria Adkins RN  Infection Prevention:   environmental surveillance performed   rest/sleep promoted   single patient room provided  Taken 3/18/2025 0000 by Dulce Maria Adkins RN  Infection Prevention:   environmental surveillance performed   rest/sleep promoted   single patient room provided  Taken 3/17/2025 2200 by Dulce Maria Adkins RN  Infection Prevention:   environmental surveillance performed   rest/sleep promoted   single patient room provided  Taken 3/17/2025 2000 by Dulce Maria Adkins RN  Infection Prevention:   environmental surveillance performed   rest/sleep promoted   single patient room provided  Goal: Optimal Comfort and Wellbeing  Outcome: Progressing  Intervention: Monitor Pain and Promote Comfort  Recent Flowsheet Documentation  Taken 3/18/2025 0200 by Dulce Maria Adkins RN  Pain Management Interventions:   relaxation techniques promoted   quiet environment facilitated   pillow support provided  Taken 3/17/2025 2200 by Dulce Maria Adkins RN  Pain Management Interventions:   quiet environment facilitated   relaxation techniques promoted   pillow support provided   position adjusted  Taken 3/17/2025 2000 by Dulce Maria Adkins RN  Pain Management Interventions:   relaxation techniques promoted   quiet environment facilitated   pillow support provided  Intervention: Provide Person-Centered Care  Recent Flowsheet Documentation  Taken 3/17/2025 2000 by Dulce Maria Adkins RN  Trust Relationship/Rapport:   care explained   choices provided   emotional support provided  Goal: Readiness for Transition of Care  Outcome: Progressing     Problem: Violence Risk or Actual  Goal: Anger and Impulse Control  Outcome: Progressing  Intervention: Minimize Safety Risk  Recent Flowsheet Documentation  Taken 3/18/2025 0200 by Dulce Maria Adkins RN  Enhanced Safety Measures: bed alarm set  Taken 3/18/2025 0000 by Dulce Maria Adkins  RN  Enhanced Safety Measures: bed alarm set  Taken 3/17/2025 2200 by Dulce Maria Adkins RN  Enhanced Safety Measures: bed alarm set  Taken 3/17/2025 2000 by Dulce Maria Adkins RN  Sensory Stimulation Regulation: care clustered  Enhanced Safety Measures: bed alarm set  Intervention: Promote Self-Control  Recent Flowsheet Documentation  Taken 3/17/2025 2000 by Dulce Maria Adkins RN  Supportive Measures:   active listening utilized   positive reinforcement provided  Environmental Support: calm environment promoted     Problem: Skin Injury Risk Increased  Goal: Skin Health and Integrity  Outcome: Progressing  Intervention: Optimize Skin Protection  Recent Flowsheet Documentation  Taken 3/18/2025 0200 by Dulce Maria Adkins RN  Activity Management: activity minimized  Pressure Reduction Techniques: frequent weight shift encouraged  Head of Bed (HOB) Positioning: HOB at 30-45 degrees  Pressure Reduction Devices: positioning supports utilized  Skin Protection: incontinence pads utilized  Taken 3/18/2025 0000 by Dulce Maria Adkins RN  Activity Management: activity minimized  Pressure Reduction Techniques: frequent weight shift encouraged  Head of Bed (HOB) Positioning: HOB at 30-45 degrees  Pressure Reduction Devices: positioning supports utilized  Skin Protection: incontinence pads utilized  Taken 3/17/2025 2200 by Dulce Maria Adkins RN  Activity Management: activity minimized  Pressure Reduction Techniques: frequent weight shift encouraged  Head of Bed (HOB) Positioning: HOB at 30-45 degrees  Pressure Reduction Devices: positioning supports utilized  Skin Protection: incontinence pads utilized  Taken 3/17/2025 2000 by Dulce Maria Adkins RN  Activity Management: activity minimized  Pressure Reduction Techniques: frequent weight shift encouraged  Head of Bed (HOB) Positioning: HOB at 30-45 degrees  Pressure Reduction Devices: positioning supports utilized  Skin Protection: incontinence pads utilized     Problem:  Comorbidity Management  Goal: Maintenance of Asthma Control  Outcome: Progressing  Intervention: Maintain Asthma Symptom Control  Recent Flowsheet Documentation  Taken 3/17/2025 2000 by Dulce Maria Adkins RN  Medication Review/Management: medications reviewed  Goal: Maintenance of Behavioral Health Symptom Control  Outcome: Progressing  Intervention: Maintain Behavioral Health Symptom Control  Recent Flowsheet Documentation  Taken 3/17/2025 2000 by Dulce Maria Adkins RN  Medication Review/Management: medications reviewed  Goal: Maintenance of COPD Symptom Control  Outcome: Progressing  Intervention: Maintain COPD (Chronic Obstructive Pulmonary Disease) Symptom Control  Recent Flowsheet Documentation  Taken 3/17/2025 2000 by Dulce Maria Adkins RN  Medication Review/Management: medications reviewed  Goal: Blood Glucose Level Within Target Range  Outcome: Progressing  Intervention: Monitor and Manage Glycemia  Recent Flowsheet Documentation  Taken 3/17/2025 2000 by Dulce Maria Adkins RN  Medication Review/Management: medications reviewed  Goal: Maintenance of Heart Failure Symptom Control  Outcome: Progressing  Intervention: Maintain Heart Failure Management  Recent Flowsheet Documentation  Taken 3/17/2025 2000 by Dulce Maria Adkins RN  Medication Review/Management: medications reviewed  Goal: Blood Pressure in Desired Range  Outcome: Progressing  Intervention: Maintain Blood Pressure Management  Recent Flowsheet Documentation  Taken 3/17/2025 2000 by Dulce Maria Adkins RN  Medication Review/Management: medications reviewed  Goal: Maintenance of Osteoarthritis Symptom Control  Outcome: Progressing  Intervention: Maintain Osteoarthritis Symptom Control  Recent Flowsheet Documentation  Taken 3/18/2025 0200 by Dulce Maria Adkins RN  Activity Management: activity minimized  Taken 3/18/2025 0000 by Dulce Maria Adkins RN  Activity Management: activity minimized  Taken 3/17/2025 2200 by Dulce Maria Adkins  RN  Activity Management: activity minimized  Taken 3/17/2025 2000 by Dulce Maria Adkins RN  Activity Management: activity minimized  Medication Review/Management: medications reviewed  Goal: Bariatric Home Regimen Maintained  Outcome: Progressing  Intervention: Maintain and Manage Postbariatric Surgery Care  Recent Flowsheet Documentation  Taken 3/17/2025 2000 by Dulce Maria Adkins RN  Medication Review/Management: medications reviewed  Goal: Maintenance of Seizure Control  Outcome: Progressing  Intervention: Maintain Seizure Symptom Control  Recent Flowsheet Documentation  Taken 3/17/2025 2000 by Dulce Maria Adkins RN  Sensory Stimulation Regulation: care clustered  Medication Review/Management: medications reviewed     Problem: Fall Injury Risk  Goal: Absence of Fall and Fall-Related Injury  Outcome: Progressing  Intervention: Identify and Manage Contributors  Recent Flowsheet Documentation  Taken 3/17/2025 2000 by Dulce Maria Adkins RN  Medication Review/Management: medications reviewed  Intervention: Promote Injury-Free Environment  Recent Flowsheet Documentation  Taken 3/18/2025 0200 by Dulce Maria Adkins RN  Safety Promotion/Fall Prevention:   safety round/check completed   nonskid shoes/slippers when out of bed   assistive device/personal items within reach   clutter free environment maintained  Taken 3/18/2025 0000 by Dulce Maria Adkins RN  Safety Promotion/Fall Prevention:   safety round/check completed   nonskid shoes/slippers when out of bed   assistive device/personal items within reach   clutter free environment maintained  Taken 3/17/2025 2200 by Dulce Maria Adkins RN  Safety Promotion/Fall Prevention:   safety round/check completed   nonskid shoes/slippers when out of bed   assistive device/personal items within reach   clutter free environment maintained  Taken 3/17/2025 2000 by Dulce Maria Adkins RN  Safety Promotion/Fall Prevention:   safety round/check completed   nonskid shoes/slippers  when out of bed   assistive device/personal items within reach   clutter free environment maintained     Problem: Confusion Acute  Goal: Optimal Cognitive Function  Outcome: Progressing  Intervention: Minimize Contributing Factors  Recent Flowsheet Documentation  Taken 3/17/2025 2000 by Dulce Maria Adkins RN  Sensory Stimulation Regulation: care clustered   Goal Outcome Evaluation:         VSS on RA.  Patient had less episodes crying out than last night.  Patient still had one episode around 0400 that was causing noise complaint to other patients during the night.  One dose of IV ativan was given per MAR PRN.  At 0600, patient is in a very pleasant mood, talking, and watching tv.  IV fluids continued per MAR.  No acute events during shift.  POC ongoing.  Pending D/C most likely (03/18).

## 2025-03-18 NOTE — PROGRESS NOTES
"      [facility]    Name:  Ave Correia   Age:  29 y.o.  Sex:  female  :  1995  MRN:  [id number]   Visit Number:  [id number]  Admission Date:  3/14/2025  Date Of Service:  25  Primary Care Physician:  Reyna Loyd APRN     LOS: 2 days :    Chief Complaint:      Follow-up of altered mental status.    Subjective:    Says she feels good today.  Denies any specific pain or concern.    Hospital Course:    Ave Correia is a 28 yo female with a PMH of cerebral palsy, hemiparesis of right dominant side due to non-cerebral vascular etiology, seizures, anxiety and hypothyroidism presenting with altered mental status.  Of note, she is currently staying at a residential program and one of the staff members is at the bedside explaining what has occurred.  It appears that the patient at baseline can have full conversation although her speech is a little impaired and sometimes difficult to understand.  She is also able to ambulate despite having some difficulties due to her left hemiparesis.  It appears that around 3 AM this morning, she woke up screaming \"I hate you\", \"I will kill you\" until about 8 AM.  She went back to her baseline until around 12:30 when she fell to the floor.  The patient then became combative, and refused to get up. Staff did notice that her urine had a foul smell and looked dark.  EMS was called and she was found to be hypotensive and brought to our ED.  At time of admission, the patient was not following commands, and is groaning.      In ED, blood pressure 66/41, heart rate 75, respiratory rate of 16, temperature 97 O2 saturation 95% on room air.  Labs on arrival showed a sodium 134, potassium 4, BUN 15, creatinine 0.6, WBC 4, hemoglobin 11.7, and platelet count 81.  Patient's random valproic acid over 150.  Chest x-ray showed no acute infiltrates.  CT head showed no acute findings.  The patient was started on IV fluids and her blood " pressure has improved to 101/73.  She was subsequently admitted to the medical floor with telemetry for further evaluation.    Patient was seen by Dr. Jackson from telemetry neurology.  He recommended to discontinue Keppra due to her drowsiness and recommended to continue Depakote but change formulation to sprinkles and recommended to use clobazam only at night and not during the daytime.  Neurology also recommended not to use lamotrigine along with Depakote as this can cause serious set up for Samir Ronald syndrome.  Neurology recommended psychiatric evaluation for schizoaffective disorder and adjustment of medications.    Review of Systems:     All systems were reviewed and negative except as mentioned in subjective, assessment and plan.    Vital Signs:    Temp:  [97.3 °F (36.3 °C)-99 °F (37.2 °C)] 99 °F (37.2 °C)  Heart Rate:  [70-87] 83  Resp:  [16-19] 18  BP: ()/(65-90) 110/79    Intake and output:    I/O last 3 completed shifts:  In: 1908.1 [I.V.:1908.1]  Out: -   I/O this shift:  In: 120 [P.O.:120]  Out: -     Physical Examination:    General Appearance:  Alert and cooperative.    Head:  Atraumatic and normocephalic.  Contracture of the neck noted.   Eyes: Conjunctivae and sclerae normal, no icterus. No pallor.Eyes do not track together.    Throat: No oral lesions, no thrush, oral mucosa moist.   Neck: Supple, trachea midline, no thyromegaly.   Lungs:   Breath sounds heard bilaterally equally.  No wheezing or crackles. No Pleural rub or bronchial breathing.   Heart:  Normal S1 and S2, no murmur, no gallop, no rub. No JVD.   Abdomen:   Normal bowel sounds, no masses, no organomegaly. Soft, nontender, nondistended, no rebound tenderness.   Extremities: Supple, 2+ pitting ankle and leg edema, no cyanosis, no clubbing.  Bilateral and upper and lower extremity contractures noted.   Skin: Warm.   Neurologic: Alert and oriented. Pleasantly and simply conversational. No facial asymmetry. Moves all four limbs  but does have generalized weakness. No tremors.    Laboratory results:    Results from last 7 days   Lab Units 03/18/25  0812 03/16/25  0534 03/15/25  0606 03/14/25  1255   SODIUM mmol/L 133* 138 138 134*   POTASSIUM mmol/L 3.9 4.0 4.7 4.0   CHLORIDE mmol/L 99 104 104 98   CO2 mmol/L 23.0 24.2 28.2 26.8   BUN mg/dL 5* 9 11 15   CREATININE mg/dL 0.67 0.52* 0.64 0.68   CALCIUM mg/dL 8.9 8.3* 7.9* 8.9   BILIRUBIN mg/dL <0.2  --   --  <0.2   ALK PHOS U/L 48  --   --  51   ALT (SGPT) U/L 12  --   --  16   AST (SGOT) U/L 18  --   --  28   GLUCOSE mg/dL 94 77 68 112*     Results from last 7 days   Lab Units 03/18/25  0812 03/16/25  0534 03/15/25  0605   WBC 10*3/mm3 6.40 3.46 4.14   HEMOGLOBIN g/dL 11.0* 10.6* 9.7*   HEMATOCRIT % 32.0* 31.1* 29.2*   PLATELETS 10*3/mm3 32* 60* 66*       Results from last 7 days   Lab Units 03/14/25  1612 03/14/25  1255   HSTROP T ng/L 17* 20*       I have reviewed the patient's laboratory results.    Radiology results:    No radiology results from the last 24 hrs    I have reviewed the patient's radiology reports.    Medication Review:     I have reviewed the patient's active and prn medications.     Problem List:      Altered mental status    Cerebral palsy    Seizure disorder    Assessment:    Acute metabolic encephalopathy, POA.  Hypovolemic hypotension, POA, improved.  Suspected valproic acid toxicity, POA.  Cerebral palsy.  Seizure disorder.  Hypothyroidism.  Chronic thrombocytopenia.    Plan:    Altered mental status  Schizoaffective Disorder, suspected.   - Patient was seen by Dr. Jackson from neurology.  At this time there is no evidence of seizures and patient may have underlying behavioral issues.  Suspect possible element of schizoaffective disorder.  - He recommended to discontinue Keppra but continue Depakote in the sprinkle form so the patient can swallow better.  He recommended to use the clobazam only at night.  He also recommended to discontinue lamotrigine as it can cause  "high risk for Weeks-Ronald syndrome when used concurrently with Depakote.  Valproic acid level should be checked at trough.  The level that was \"high\" was at random.  Neurology does not suspect danielle toxicity.  - Psychiatry consultation, recommendations appreciated; increased Abilify  -Neuro appt this Thursday.     Hypovolemic hypotension, resolved  - Improved with IV hydration.  - No evidence of sepsis at this time as she has not been febrile and does not have leukocytosis.  - Urine analysis was fairly unremarkable and chest x-ray showed no infiltrate.  Respiratory panel was negative.    Chronic thrombocytopenia.  - Exact etiology of this is uncertain but has been going on at least 2022 as per chart.    Updated caregiver in person.    Discussed with nursing staff at the bedside and multidisciplinary team.    I have reviewed the copied text and it is accurate as of 03/18/25    DVT Prophylaxis: SCDs (Lovenox discontinued due to thrombocytopenia)  Code Status: Full  Diet: Regular  Discharge Plan: Hopefully, patient should be able to go back to group home facility tomorrow.    Brian Joseph Kerley, DO  03/18/25  15:49 EDT    Dictated utilizing Dragon dictation.    "

## 2025-03-18 NOTE — PLAN OF CARE
Goal Outcome Evaluation:  Plan of Care Reviewed With: patient        Progress: no change  Outcome Evaluation: Vs stable. Patient had one yelling out episode with severe anxiety that required prn medication. Patient also had brief seizure lasting less than 1 minute. no significant events, continuing to monitor.

## 2025-03-18 NOTE — CASE MANAGEMENT/SOCIAL WORK
Case Management/Social Work    Patient Name:  Ave Correia  YOB: 1995  MRN: [id number]  Admit Date:  3/14/2025        08:56 EDT   Discharge Plan       Row Name 03/18/25 0856       Plan    Plan Patient to discharge to [facility] once ready. No additional needs.                        Electronically signed by:  Alejandro Beltran RN  03/18/25 08:56 EDT

## 2025-03-19 ENCOUNTER — READMISSION MANAGEMENT (OUTPATIENT)
Dept: CALL CENTER | Facility: HOSPITAL | Age: 30
End: 2025-03-19
Payer: MEDICARE

## 2025-03-19 VITALS
HEART RATE: 84 BPM | WEIGHT: 108.47 LBS | RESPIRATION RATE: 16 BRPM | TEMPERATURE: 97.9 F | DIASTOLIC BLOOD PRESSURE: 72 MMHG | BODY MASS INDEX: 24.33 KG/M2 | SYSTOLIC BLOOD PRESSURE: 101 MMHG | OXYGEN SATURATION: 90 %

## 2025-03-19 PROBLEM — R41.82 ALTERED MENTAL STATUS: Status: RESOLVED | Noted: 2025-03-14 | Resolved: 2025-03-19

## 2025-03-19 PROCEDURE — 97116 GAIT TRAINING THERAPY: CPT

## 2025-03-19 PROCEDURE — 97530 THERAPEUTIC ACTIVITIES: CPT

## 2025-03-19 PROCEDURE — 99239 HOSP IP/OBS DSCHRG MGMT >30: CPT | Performed by: STUDENT IN AN ORGANIZED HEALTH CARE EDUCATION/TRAINING PROGRAM

## 2025-03-19 PROCEDURE — 25010000002 LORAZEPAM PER 2 MG: Performed by: FAMILY MEDICINE

## 2025-03-19 RX ORDER — ACETAMINOPHEN 325 MG/1
650 TABLET ORAL EVERY 6 HOURS PRN
Status: DISCONTINUED | OUTPATIENT
Start: 2025-03-19 | End: 2025-03-19 | Stop reason: HOSPADM

## 2025-03-19 RX ORDER — ARIPIPRAZOLE 10 MG/1
10 TABLET ORAL 2 TIMES DAILY
Start: 2025-03-19

## 2025-03-19 RX ADMIN — DIVALPROEX SODIUM 250 MG: 125 CAPSULE ORAL at 02:00

## 2025-03-19 RX ADMIN — PANTOPRAZOLE SODIUM 40 MG: 40 TABLET, DELAYED RELEASE ORAL at 08:42

## 2025-03-19 RX ADMIN — DIVALPROEX SODIUM 250 MG: 125 CAPSULE ORAL at 08:42

## 2025-03-19 RX ADMIN — LEVOTHYROXINE SODIUM 75 MCG: 0.07 TABLET ORAL at 08:42

## 2025-03-19 RX ADMIN — ARIPIPRAZOLE 10 MG: 5 TABLET ORAL at 08:42

## 2025-03-19 RX ADMIN — LORAZEPAM 1 MG: 2 INJECTION INTRAMUSCULAR; INTRAVENOUS at 03:46

## 2025-03-19 RX ADMIN — Medication 10 ML: at 08:42

## 2025-03-19 NOTE — PLAN OF CARE
Goal Outcome Evaluation: Patient being discharged to [facility].

## 2025-03-19 NOTE — CASE MANAGEMENT/SOCIAL WORK
Case Management Discharge Note      Final Note: DC back to [facility]    Provided Post Acute Provider List?: N/A  Provided Post Acute Provider Quality & Resource List?: N/A    Selected Continued Care - Admitted Since 3/14/2025       Destination    No services have been selected for the patient.                Durable Medical Equipment    No services have been selected for the patient.                Dialysis/Infusion    No services have been selected for the patient.                Home Medical Care    No services have been selected for the patient.                Therapy    No services have been selected for the patient.                Community Resources    No services have been selected for the patient.                Community & DME    No services have been selected for the patient.                    Transportation Services  Private: Car    Final Discharge Disposition Code: 01 - home or self-care

## 2025-03-19 NOTE — THERAPY TREATMENT NOTE
Patient Name: Ave Correia  : 1995    MRN: [id number]                              Today's Date: 3/19/2025       Admit Date: 3/14/2025    Visit Dx:     ICD-10-CM ICD-9-CM   1. [place] coma scale total score 9-12, at arrival to emergency department  R40.2422 780.01   2. Hypotension due to hypovolemia  E86.1 458.8     276.52     Patient Active Problem List   Diagnosis    Blind left eye    Cerebral palsy    Seizure disorder     Past Medical History:   Diagnosis Date    Blind right eye     Cerebral palsy     Hemiparesis of right dominant side due to non-cerebrovascular etiology     Impaired mobility     Seizures      Past Surgical History:   Procedure Laterality Date    DENTAL EXAMINATION UNDER ANESTHESIA W/ CLEANING AND XRAYS      Fillings placed.    IMPLANTATION VAGAL NERVE STIMULATOR        General Information       Row Name 25 1200          Physical Therapy Time and Intention    Document Type therapy note (daily note)  -     Mode of Treatment physical therapy  -       Row Name 25 1200          General Information    Patient Profile Reviewed yes  -       Row Name 25 1200          Cognition    Orientation Status (Cognition) oriented x 4  -       Row Name 25 1200          Safety Issues/Impairments Affecting Functional Mobility    Safety Issues Affecting Function (Mobility) safety precaution awareness;safety precautions follow-through/compliance;awareness of need for assistance  -     Impairments Affecting Function (Mobility) balance;endurance/activity tolerance;grasp;range of motion (ROM);postural/trunk control;motor control;coordination;cognition;strength  -     Cognitive Impairments, Mobility Safety/Performance insight into deficits/self-awareness  -               User Key  (r) = Recorded By, (t) = Taken By, (c) = Cosigned By      Initials Name Provider Type     Eusebia Livingston PT Physical Therapist                   Mobility       Row Name 25 1204          Bed  Mobility    Bed Mobility supine-sit;sit-supine  -HW     Supine-Sit [place] (Bed Mobility) modified independence  -HW     Sit-Supine [place] (Bed Mobility) modified independence  -HW     Assistive Device (Bed Mobility) head of bed elevated  -HW       Row Name 03/19/25 1204          Sit-Stand Transfer    Sit-Stand [place] (Transfers) contact guard  -HW     Assistive Device (Sit-Stand Transfers) walker, front-wheeled  -HW       Row Name 03/19/25 1204          Gait/Stairs (Locomotion)    [place] Level (Gait) contact guard  -HW     Assistive Device (Gait) walker, front-wheeled  -HW     Patient was able to Ambulate yes  -HW     Distance in Feet (Gait) 200  -HW     Deviations/Abnormal Patterns (Gait) bilateral deviations;base of support, narrow  -HW     Bilateral Gait Deviations forward flexed posture;heel strike decreased  -HW     [place] Level (Stairs) not tested  -               User Key  (r) = Recorded By, (t) = Taken By, (c) = Cosigned By      Initials Name Provider Type     Eusebia Livingston PT Physical Therapist                   Obj/Interventions       Row Name 03/19/25 1205          Balance    Balance Assessment sitting static balance;sitting dynamic balance;standing static balance;standing dynamic balance  -HW     Static Sitting Balance modified independence  -HW     Dynamic Sitting Balance modified independence  -HW     Position, Sitting Balance unsupported;sitting edge of bed  -HW     Static Standing Balance contact guard  -HW     Dynamic Standing Balance contact guard  -HW     Position/Device Used, Standing Balance supported;walker, front-wheeled  -HW               User Key  (r) = Recorded By, (t) = Taken By, (c) = Cosigned By      Initials Name Provider Type    Eusebia Aj PT Physical Therapist                   Goals/Plan    No documentation.                  Clinical Impression       Row Name 03/19/25 1206          Pain    Pretreatment Pain Rating 0/10 - no pain  -      Posttreatment Pain Rating 0/10 - no pain  -       Row Name 03/19/25 1206          Plan of Care Review    Plan of Care Reviewed With patient  -     Progress improving  -     Outcome Evaluation Pt participated in PT treatment session this date. Pt presents supine in bed, pleasant and agreeable to  PT treatment session. Pt performed supine > sit on EOB with mod (I). Pt donned helmet prior to OOB mobility. Pt performed STS transfer with CGA and use of a RW. Pt ambulated x200' with RW and CGA, no LOB noted. Pt demonstrated increased FF posture during gait and short LE step length. Pt returned to supine with mod (I). Following treatment, pt left semi-fowlers position in bed with bed alarm active, call light and all needs within reach. Continue with current POC; progress interventions as tolerated.  -       Row Name 03/19/25 1206          Vital Signs    O2 Delivery Pre Treatment room air  -HW     O2 Delivery Intra Treatment room air  -HW     O2 Delivery Post Treatment room air  -HW     Pre Patient Position Supine  -     Intra Patient Position Standing  -     Post Patient Position Supine  -       Row Name 03/19/25 1206          Positioning and Restraints    Pre-Treatment Position in bed  -HW     Post Treatment Position bed  -HW     In Bed fowlers;call light within reach;encouraged to call for assist;exit alarm on;notified [facility]               User Key  (r) = Recorded By, (t) = Taken By, (c) = Cosigned By      Initials Name Provider Type     Eusebia Livingston PT Physical Therapist                   Outcome Measures       Row Name 03/19/25 1211          How much help from another person do you currently need...    Turning from your back to your side while in flat bed without using bedrails? 4  -HW     Moving from lying on back to sitting on the side of a flat bed without bedrails? 4  -HW     Moving to and from a bed to a chair (including a wheelchair)? 3  -HW     Standing up from a chair using your arms (e.g.,  wheelchair, bedside chair)? 3  -     Climbing 3-5 steps with a railing? 3  -     To walk in hospital room? 3  -     AM-PAC 6 Clicks Score (PT) 20  -     Highest Level of Mobility Goal 6 --> Walk 10 steps or more  -       Row Name 03/19/25 1211 03/19/25 1051       Functional Assessment    Outcome Measure Options AM-PAC 6 Clicks Basic Mobility (PT)  - AM-PAC 6 Clicks Daily Activity (OT)  -              User Key  (r) = Recorded By, (t) = Taken By, (c) = Cosigned By      Initials Name Provider Type     Daysi Decker Occupational Therapist     Eusebia Livingston, PT Physical Therapist                                 Physical Therapy Education       Title: PT OT SLP Therapies (In Progress)       Topic: Physical Therapy (In Progress)       Point: Mobility training (In Progress)       Learning Progress Summary            Patient Nonacceptance, E,TB, NL,NR by  at 3/17/2025 1324    Comment: role of PT during IP admission                      Point: Home exercise program (Not Started)       Learner Progress:  Not documented in this visit.              Point: Body mechanics (Done)       Learning Progress Summary            Patient Acceptance, E,TB, VU by  at 3/19/2025 1212    Comment: proper posture during gait                      Point: Precautions (Not Started)       Learner Progress:  Not documented in this visit.                              User Key       Initials Effective Dates Name Provider Type Discipline     11/29/23 -  Eusebia Livingston, TOMMY Physical Therapist PT                  PT Recommendation and Plan  Planned Therapy Interventions (PT): balance training, bed mobility training, gait training, home exercise program, neuromuscular re-education, motor coordination training, postural re-education, strengthening, stretching, patient/family education, ROM (range of motion), transfer training  Progress: improving  Outcome Evaluation: Pt participated in PT treatment session this date. Pt presents supine in  bed, pleasant and agreeable to  PT treatment session. Pt performed supine > sit on EOB with mod (I). Pt donned helmet prior to OOB mobility. Pt performed STS transfer with CGA and use of a RW. Pt ambulated x200' with RW and CGA, no LOB noted. Pt demonstrated increased FF posture during gait and short LE step length. Pt returned to supine with mod (I). Following treatment, pt left semi-fowlers position in bed with bed alarm active, call light and all needs within reach. Continue with current POC; progress interventions as tolerated.     Time Calculation:   PT Evaluation Complexity  History, PT Evaluation Complexity: 3 or more personal factors and/or comorbidities  Examination of Body Systems (PT Eval Complexity): total of 3 or more elements  Clinical Presentation (PT Evaluation Complexity): evolving  Clinical Decision Making (PT Evaluation Complexity): moderate complexity  Overall Complexity (PT Evaluation Complexity): moderate complexity     PT Charges       Row Name 03/19/25 1212             Time Calculation    Start Time 0917  -HW      Stop Time 0940  -      Time Calculation (min) 23 min  -      PT Received On 03/19/25  -         Time Calculation- PT    Total Timed Code Minutes- PT 23 minute(s)  -HW         Timed Charges    86134 - Gait Training Minutes  15  -HW      03478 - PT Therapeutic Activity Minutes 8  -HW         Total Minutes    Timed Charges Total Minutes 23  -HW       Total Minutes 23  -HW                User Key  (r) = Recorded By, (t) = Taken By, (c) = Cosigned By      Initials Name Provider Type     Eusebia Livingston PT Physical Therapist                  Therapy Charges for Today       Code Description Service Date Service Provider Modifiers Qty    36674447395 HC GAIT TRAINING EA 15 MIN 3/19/2025 Eusebia Livingston, PT GP 1    76381520378 HC PT THERAPEUTIC ACT EA 15 MIN 3/19/2025 Eusebia Livingston, PT GP 1            PT G-Codes  Outcome Measure Options: AM-PAC 6 Clicks Basic Mobility (PT)  AM-PAC 6 Clicks  Score (PT): 20  AM-PAC 6 Clicks Score (OT): 19  PT Discharge Summary  Anticipated Discharge Disposition (PT): home with 24/7 care    Eusebia Livingston, PT  3/19/2025

## 2025-03-19 NOTE — PLAN OF CARE
Goal Outcome Evaluation:  Plan of Care Reviewed With: patient        Progress: improving  Outcome Evaluation: Pt participated in PT treatment session this date. Pt presents supine in bed, pleasant and agreeable to  PT treatment session. Pt performed supine > sit on EOB with mod (I). Pt donned helmet prior to OOB mobility. Pt performed STS transfer with CGA and use of a RW. Pt ambulated x200' with RW and CGA, no LOB noted. Pt demonstrated increased FF posture during gait and short LE step length. Pt returned to supine with mod (I). Following treatment, pt left semi-fowlers position in bed with bed alarm active, call light and all needs within reach. Continue with current POC; progress interventions as tolerated.    Anticipated Discharge Disposition (PT): home with 24/7 care

## 2025-03-19 NOTE — CASE MANAGEMENT/SOCIAL WORK
Case Management/Social Work    Patient Name:  Ave Correia  YOB: 1995  MRN: [id number]  Admit Date:  3/14/2025        ROSA M left message for Guardian/April regarding IMM. SW will attempt again at a later time. Plan is for pt to return to [facility] today.     12:12 EDT SW attempted to contact April again, no answer. ROSA M called and spoke with Joy/caregiver at [facility] who states she is out sick and to call office [phone]. ROSA M spoke with Jacque who will be the one transporting pt back to home. ROSA M informed her that pt will also receive a walker prior to d/c per recs of PT. ROSA M asked provider for order. Jacque gave verbal consent for IMM.       Electronically signed by:  GABY Steve  03/19/25 09:20 EDT

## 2025-03-19 NOTE — PLAN OF CARE
Goal Outcome Evaluation:  Plan of Care Reviewed With: patient        Progress: improving  Outcome Evaluation: Pt received supine in bed, able to don her helmet independently.  Pt sat eob independently, stood with cga and RW and walked 200' using RW and cga.  Pt returned to her room and was able to get back into bed independently, scooting up to head of bed independently.  Pt is motivated to return home today.  Cont OT per POC.    Anticipated Discharge Disposition (OT): adult foster care/group home

## 2025-03-19 NOTE — NURSING NOTE
Pt belongings including magnet, and clobazam given to Jacque- Helmet on Pt at this time. Caregiver she was also provided Pt paperwork.

## 2025-03-19 NOTE — PLAN OF CARE
Problem: Adult Inpatient Plan of Care  Goal: Plan of Care Review  Outcome: Progressing  Goal: Patient-Specific Goal (Individualized)  Outcome: Progressing  Goal: Absence of Hospital-Acquired Illness or Injury  Outcome: Progressing  Intervention: Identify and Manage Fall Risk  Recent Flowsheet Documentation  Taken 3/19/2025 0200 by Dulce Maria Adkins RN  Safety Promotion/Fall Prevention:   safety round/check completed   nonskid shoes/slippers when out of bed   assistive device/personal items within reach   clutter free environment maintained  Taken 3/19/2025 0000 by Dulce Maria Adkins RN  Safety Promotion/Fall Prevention:   safety round/check completed   nonskid shoes/slippers when out of bed   assistive device/personal items within reach   clutter free environment maintained  Taken 3/18/2025 2200 by Dulce Maria Adkins RN  Safety Promotion/Fall Prevention:   safety round/check completed   nonskid shoes/slippers when out of bed   assistive device/personal items within reach   clutter free environment maintained  Taken 3/18/2025 2000 by Dulce Maria Adkins RN  Safety Promotion/Fall Prevention:   safety round/check completed   nonskid shoes/slippers when out of bed   assistive device/personal items within reach   clutter free environment maintained  Intervention: Prevent Skin Injury  Recent Flowsheet Documentation  Taken 3/19/2025 0200 by Dulce Maria Adkins RN  Body Position:   sitting up in bed   legs elevated  Skin Protection: incontinence pads utilized  Taken 3/19/2025 0000 by Dulce Maria Adkins RN  Body Position:   supine   legs elevated  Skin Protection: incontinence pads utilized  Taken 3/18/2025 2200 by Dulce Maria Adkins RN  Body Position:   tilted   left   sitting up in bed   legs elevated  Skin Protection: incontinence pads utilized  Taken 3/18/2025 2000 by Dulce Maria Adkins RN  Body Position:   sitting up in bed   legs elevated  Skin Protection: incontinence pads utilized  Intervention: Prevent  Infection  Recent Flowsheet Documentation  Taken 3/19/2025 0200 by Dulce Maria Adkins RN  Infection Prevention:   environmental surveillance performed   rest/sleep promoted   single patient room provided  Taken 3/19/2025 0000 by Dulce Maria Adkins RN  Infection Prevention:   environmental surveillance performed   rest/sleep promoted   single patient room provided  Taken 3/18/2025 2200 by Dulce Maria Adkins RN  Infection Prevention:   environmental surveillance performed   rest/sleep promoted   single patient room provided  Taken 3/18/2025 2000 by Dulce Maria Adkins RN  Infection Prevention:   environmental surveillance performed   rest/sleep promoted   single patient room provided  Goal: Optimal Comfort and Wellbeing  Outcome: Progressing  Goal: Readiness for Transition of Care  Outcome: Progressing     Problem: Violence Risk or Actual  Goal: Anger and Impulse Control  Outcome: Progressing  Intervention: Minimize Safety Risk  Recent Flowsheet Documentation  Taken 3/19/2025 0200 by Dulce Maria Adkins RN  Enhanced Safety Measures: bed alarm set  Taken 3/19/2025 0000 by Dulce Maria Adkins RN  Enhanced Safety Measures: bed alarm set  Taken 3/18/2025 2200 by Dulce Maria Adkins RN  Enhanced Safety Measures: bed alarm set  Taken 3/18/2025 2000 by Dulce Maria Adkins RN  Sensory Stimulation Regulation:   care clustered   television on  Enhanced Safety Measures: bed alarm set  Intervention: Promote Self-Control  Recent Flowsheet Documentation  Taken 3/18/2025 2000 by Dulce Maria Adkins RN  Supportive Measures: active listening utilized  Environmental Support: calm environment promoted     Problem: Skin Injury Risk Increased  Goal: Skin Health and Integrity  Outcome: Progressing  Intervention: Optimize Skin Protection  Recent Flowsheet Documentation  Taken 3/19/2025 0200 by Dulce Maria Adkins RN  Activity Management: activity minimized  Pressure Reduction Techniques: frequent weight shift encouraged  Head of Bed  (HOB) Positioning: HOB elevated  Pressure Reduction Devices: positioning supports utilized  Skin Protection: incontinence pads utilized  Taken 3/19/2025 0000 by Dulce Maria Adkins RN  Activity Management: activity minimized  Pressure Reduction Techniques: frequent weight shift encouraged  Head of Bed (HOB) Positioning: HOB at 30-45 degrees  Pressure Reduction Devices: positioning supports utilized  Skin Protection: incontinence pads utilized  Taken 3/18/2025 2200 by Dulce Maria Adkins RN  Activity Management: activity minimized  Pressure Reduction Techniques: frequent weight shift encouraged  Head of Bed (HOB) Positioning: HOB at 30-45 degrees  Pressure Reduction Devices: positioning supports utilized  Skin Protection: incontinence pads utilized  Taken 3/18/2025 2000 by Dulce Maria Adkins RN  Activity Management: activity minimized  Pressure Reduction Techniques: frequent weight shift encouraged  Head of Bed (HOB) Positioning: HOB at 30-45 degrees  Pressure Reduction Devices: positioning supports utilized  Skin Protection: incontinence pads utilized     Problem: Comorbidity Management  Goal: Maintenance of Asthma Control  Outcome: Progressing  Intervention: Maintain Asthma Symptom Control  Recent Flowsheet Documentation  Taken 3/18/2025 2000 by Dulce Maria Adkins RN  Medication Review/Management: medications reviewed  Goal: Maintenance of Behavioral Health Symptom Control  Outcome: Progressing  Intervention: Maintain Behavioral Health Symptom Control  Recent Flowsheet Documentation  Taken 3/18/2025 2000 by Dulce Maria Adkins RN  Medication Review/Management: medications reviewed  Goal: Maintenance of COPD Symptom Control  Outcome: Progressing  Intervention: Maintain COPD (Chronic Obstructive Pulmonary Disease) Symptom Control  Recent Flowsheet Documentation  Taken 3/18/2025 2000 by Dulce Maria Adkins RN  Medication Review/Management: medications reviewed  Goal: Blood Glucose Level Within Target Range  Outcome:  Progressing  Intervention: Monitor and Manage Glycemia  Recent Flowsheet Documentation  Taken 3/18/2025 2000 by Dulce Maria Adkins RN  Medication Review/Management: medications reviewed  Goal: Maintenance of Heart Failure Symptom Control  Outcome: Progressing  Intervention: Maintain Heart Failure Management  Recent Flowsheet Documentation  Taken 3/18/2025 2000 by Dulce Maria Adkins RN  Medication Review/Management: medications reviewed  Goal: Blood Pressure in Desired Range  Outcome: Progressing  Intervention: Maintain Blood Pressure Management  Recent Flowsheet Documentation  Taken 3/18/2025 2000 by Dulce Maria Adkins RN  Medication Review/Management: medications reviewed  Goal: Maintenance of Osteoarthritis Symptom Control  Outcome: Progressing  Intervention: Maintain Osteoarthritis Symptom Control  Recent Flowsheet Documentation  Taken 3/19/2025 0200 by Dulce Maria Adknis RN  Activity Management: activity minimized  Taken 3/19/2025 0000 by Dulce Maria Adkins RN  Activity Management: activity minimized  Taken 3/18/2025 2200 by Dulce Maria Adkins RN  Activity Management: activity minimized  Taken 3/18/2025 2000 by Dulce Maria Adkins RN  Activity Management: activity minimized  Medication Review/Management: medications reviewed  Goal: Bariatric Home Regimen Maintained  Outcome: Progressing  Intervention: Maintain and Manage Postbariatric Surgery Care  Recent Flowsheet Documentation  Taken 3/18/2025 2000 by Dulce Maria Adkins RN  Medication Review/Management: medications reviewed  Goal: Maintenance of Seizure Control  Outcome: Progressing  Intervention: Maintain Seizure Symptom Control  Recent Flowsheet Documentation  Taken 3/18/2025 2000 by Dulce Maria Adkins RN  Sensory Stimulation Regulation:   care clustered   television on  Medication Review/Management: medications reviewed     Problem: Fall Injury Risk  Goal: Absence of Fall and Fall-Related Injury  Outcome: Progressing  Intervention: Identify and  Manage Contributors  Recent Flowsheet Documentation  Taken 3/18/2025 2000 by Dulce Maria Adkins RN  Medication Review/Management: medications reviewed  Intervention: Promote Injury-Free Environment  Recent Flowsheet Documentation  Taken 3/19/2025 0200 by Dulce Maria Adkins RN  Safety Promotion/Fall Prevention:   safety round/check completed   nonskid shoes/slippers when out of bed   assistive device/personal items within reach   clutter free environment maintained  Taken 3/19/2025 0000 by Dulce Maria Adkins RN  Safety Promotion/Fall Prevention:   safety round/check completed   nonskid shoes/slippers when out of bed   assistive device/personal items within reach   clutter free environment maintained  Taken 3/18/2025 2200 by Dulce Maria Adkins RN  Safety Promotion/Fall Prevention:   safety round/check completed   nonskid shoes/slippers when out of bed   assistive device/personal items within reach   clutter free environment maintained  Taken 3/18/2025 2000 by Dulce Maria Adkins RN  Safety Promotion/Fall Prevention:   safety round/check completed   nonskid shoes/slippers when out of bed   assistive device/personal items within reach   clutter free environment maintained     Problem: Confusion Acute  Goal: Optimal Cognitive Function  Outcome: Progressing  Intervention: Minimize Contributing Factors  Recent Flowsheet Documentation  Taken 3/18/2025 2000 by Dulce Maria Adkins RN  Sensory Stimulation Regulation:   care clustered   television on   Goal Outcome Evaluation:            VSS on RA.  IV fluids continued.  Patient had one brief seizure episode lasting <30 seconds.   Patient more alert and verbal throughout night.  Patient had one episode of yelling out that required 1mg of ativan given per MAR.  No further complaints from patient at this time.  POC ongoing.  Pending D/C.

## 2025-03-20 ENCOUNTER — TELEPHONE (OUTPATIENT)
Dept: PSYCHIATRY | Facility: CLINIC | Age: 30
End: 2025-03-20
Payer: MEDICARE

## 2025-03-20 NOTE — OUTREACH NOTE
Prep Survey      Flowsheet Row Responses   [facility] discharged from? [facility]   Is LACE score < 7 ? No   Eligibility Readm Mgmt   Discharge diagnosis Altered mental status   Does the patient have one of the following disease processes/diagnoses(primary or secondary)? Other   Does the patient have Home health ordered? No   Is there a DME ordered? No   General alerts for this patient cerebral palsy   Prep survey completed? Yes            JASPER LUND - Registered Nurse

## 2025-03-20 NOTE — TELEPHONE ENCOUNTER
Jacque, from [facility] called about Ave's upcoming appointments with Teressa.  She is scheduled for 4/10 and 5/19.  She would like to cancel the 5/19/25 and change 4/10/25 to a [facility] Video Visit.  She states that they are now able to get back in to the [facility] to do Video visits again.

## 2025-03-21 LAB
CLOBAZAM SERPL-MCNC: 107 NG/ML (ref 30–300)
NORCLOBAZAM SERPL-MCNC: 3032 NG/ML (ref 300–3000)
QT INTERVAL: 340 MS
QTC INTERVAL: 425 MS

## 2025-03-25 ENCOUNTER — READMISSION MANAGEMENT (OUTPATIENT)
Dept: CALL CENTER | Facility: HOSPITAL | Age: 30
End: 2025-03-25
Payer: MEDICARE

## 2025-03-25 NOTE — OUTREACH NOTE
Medical Week 1 Survey      Flowsheet Row Responses   [facility] patient discharged from? [facility]   Does the patient have one of the following disease processes/diagnoses(primary or secondary)? Other   Week 1 attempt successful? Yes   Call start time 0831   Call end time 0834   General alerts for this patient cerebral palsy   Discharge diagnosis Altered mental status   Is patient permission given to speak with other caregiver? Yes   List who call center can speak with Legal Guardian- April   Person spoke with today (if not patient) and relationship Legal Guardian   Does the patient have all medications ordered at discharge? Yes   Is the patient taking all medications as directed (includes completed medication regime)? Yes   Has home health visited the patient within 72 hours of discharge? N/A   What is the patient's perception of their health status since discharge? Improving   Week 1 call completed? Yes   Graduated Yes   Did the patient feel the follow up calls were helpful during their recovery period? Yes   Was the number of calls appropriate? Yes   Would this patient benefit from a Referral to [facility] Social Work? No   Is the patient interested in additional calls from an ambulatory ? No   Graduated/Revoked comments Brief call with April (legal guardian), states patient is doing well, denies any questions or concerns, patient lives at [facility], no further calls needed.   Call end time 0834            Lynn RUTLEDGE - Registered Nurse

## 2025-04-10 ENCOUNTER — OFFICE VISIT (OUTPATIENT)
Dept: PSYCHIATRY | Facility: CLINIC | Age: 30
End: 2025-04-10
Payer: MEDICARE

## 2025-04-10 DIAGNOSIS — G40.909 SEIZURE DISORDER: ICD-10-CM

## 2025-04-10 DIAGNOSIS — Z79.899 LONG-TERM USE OF HIGH-RISK MEDICATION: ICD-10-CM

## 2025-04-10 DIAGNOSIS — F79 INTELLECTUAL DISABILITY: ICD-10-CM

## 2025-04-10 DIAGNOSIS — G80.9 CEREBRAL PALSY, UNSPECIFIED TYPE: ICD-10-CM

## 2025-04-10 DIAGNOSIS — F91.9 BEHAVIOR DISTURBANCE: Primary | ICD-10-CM

## 2025-04-10 PROCEDURE — 1160F RVW MEDS BY RX/DR IN RCRD: CPT | Performed by: NURSE PRACTITIONER

## 2025-04-10 PROCEDURE — 1159F MED LIST DOCD IN RCRD: CPT | Performed by: NURSE PRACTITIONER

## 2025-04-10 PROCEDURE — 99214 OFFICE O/P EST MOD 30 MIN: CPT | Performed by: NURSE PRACTITIONER
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
## 2025-04-11 ENCOUNTER — TELEPHONE (OUTPATIENT)
Dept: FAMILY MEDICINE CLINIC | Facility: CLINIC | Age: 30
End: 2025-04-11
Payer: MEDICARE

## 2025-04-11 DIAGNOSIS — F91.9 BEHAVIOR DISTURBANCE: Primary | ICD-10-CM

## 2025-04-11 DIAGNOSIS — F79 INTELLECTUAL DISABILITY: ICD-10-CM

## 2025-04-11 NOTE — TELEPHONE ENCOUNTER
Spoke with Leda she gave verbal authorization for Ave to be seen in the [facility] for BH continuation of treatment.  She also gave verbal  authorization for to discuss treatment.  When appt is scheduled we are to notify [facility] of date and time

## 2025-05-29 ENCOUNTER — OFFICE VISIT (OUTPATIENT)
Dept: PSYCHIATRY | Facility: CLINIC | Age: 30
End: 2025-05-29
Payer: MEDICARE

## 2025-05-29 VITALS
HEIGHT: 56 IN | WEIGHT: 101 LBS | DIASTOLIC BLOOD PRESSURE: 64 MMHG | BODY MASS INDEX: 22.72 KG/M2 | OXYGEN SATURATION: 96 % | SYSTOLIC BLOOD PRESSURE: 102 MMHG | HEART RATE: 90 BPM

## 2025-05-29 DIAGNOSIS — F91.9 BEHAVIOR DISTURBANCE: Primary | ICD-10-CM

## 2025-05-29 DIAGNOSIS — F79 INTELLECTUAL DISABILITY: ICD-10-CM

## 2025-05-29 DIAGNOSIS — G40.909 SEIZURE DISORDER: ICD-10-CM

## 2025-05-29 RX ORDER — IBUPROFEN 600 MG/1
600 TABLET, FILM COATED ORAL EVERY 6 HOURS PRN
COMMUNITY
Start: 2022-04-22

## 2025-05-29 RX ORDER — CLONAZEPAM 0.5 MG/1
0.5 TABLET ORAL 2 TIMES DAILY PRN
COMMUNITY
Start: 2025-05-16 | End: 2025-06-15
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
## 2025-06-17 ENCOUNTER — HOSPITAL ENCOUNTER (EMERGENCY)
Facility: HOSPITAL | Age: 30
Discharge: HOME | End: 2025-06-17
Attending: EMERGENCY MEDICINE | Admitting: EMERGENCY MEDICINE
Payer: MEDICARE

## 2025-06-17 VITALS
WEIGHT: 100.97 LBS | TEMPERATURE: 98.3 F | BODY MASS INDEX: 22.71 KG/M2 | SYSTOLIC BLOOD PRESSURE: 92 MMHG | RESPIRATION RATE: 18 BRPM | DIASTOLIC BLOOD PRESSURE: 77 MMHG | HEIGHT: 56 IN | HEART RATE: 79 BPM | OXYGEN SATURATION: 96 %

## 2025-06-17 DIAGNOSIS — R56.9 SEIZURE: Primary | ICD-10-CM

## 2025-06-17 LAB
ALBUMIN SERPL-MCNC: 3.7 G/DL (ref 3.5–5.2)
ALBUMIN/GLOB SERPL: 1.3 G/DL
ALP SERPL-CCNC: 61 U/L (ref 39–117)
ALT SERPL W P-5'-P-CCNC: 7 U/L (ref 1–33)
AMPHET+METHAMPHET UR QL: NEGATIVE
AMPHETAMINES UR QL: NEGATIVE
ANION GAP SERPL CALCULATED.3IONS-SCNC: 9.7 MMOL/L (ref 5–15)
AST SERPL-CCNC: 12 U/L (ref 1–32)
BACTERIA UR QL AUTO: ABNORMAL /HPF
BARBITURATES UR QL SCN: NEGATIVE
BASOPHILS # BLD AUTO: 0.01 10*3/MM3 (ref 0–0.2)
BASOPHILS NFR BLD AUTO: 0.2 % (ref 0–1.5)
BENZODIAZ UR QL SCN: POSITIVE
BILIRUB SERPL-MCNC: <0.2 MG/DL (ref 0–1.2)
BILIRUB UR QL STRIP: NEGATIVE
BUN SERPL-MCNC: 9 MG/DL (ref 6–20)
BUN/CREAT SERPL: 21.4 (ref 7–25)
BUPRENORPHINE SERPL-MCNC: NEGATIVE NG/ML
CALCIUM SPEC-SCNC: 9.5 MG/DL (ref 8.6–10.5)
CANNABINOIDS SERPL QL: NEGATIVE
CHLORIDE SERPL-SCNC: 106 MMOL/L (ref 98–107)
CLARITY UR: CLEAR
CO2 SERPL-SCNC: 24.3 MMOL/L (ref 22–29)
COCAINE UR QL: NEGATIVE
COLOR UR: YELLOW
CREAT SERPL-MCNC: 0.42 MG/DL (ref 0.57–1)
DEPRECATED RDW RBC AUTO: 48.3 FL (ref 37–54)
EGFRCR SERPLBLD CKD-EPI 2021: 135.1 ML/MIN/1.73
EOSINOPHIL # BLD AUTO: 0.04 10*3/MM3 (ref 0–0.4)
EOSINOPHIL NFR BLD AUTO: 1 % (ref 0.3–6.2)
ERYTHROCYTE [DISTWIDTH] IN BLOOD BY AUTOMATED COUNT: 13.2 % (ref 12.3–15.4)
FENTANYL UR-MCNC: NEGATIVE NG/ML
GLOBULIN UR ELPH-MCNC: 2.8 GM/DL
GLUCOSE SERPL-MCNC: 86 MG/DL (ref 65–99)
GLUCOSE UR STRIP-MCNC: NEGATIVE MG/DL
HCT VFR BLD AUTO: 31.6 % (ref 34–46.6)
HGB BLD-MCNC: 10.5 G/DL (ref 12–15.9)
HGB UR QL STRIP.AUTO: NEGATIVE
HOLD SPECIMEN: NORMAL
HOLD SPECIMEN: NORMAL
HYALINE CASTS UR QL AUTO: ABNORMAL /LPF
IMM GRANULOCYTES # BLD AUTO: 0 10*3/MM3 (ref 0–0.05)
IMM GRANULOCYTES NFR BLD AUTO: 0 % (ref 0–0.5)
KETONES UR QL STRIP: NEGATIVE
LEUKOCYTE ESTERASE UR QL STRIP.AUTO: ABNORMAL
LYMPHOCYTES # BLD AUTO: 1.9 10*3/MM3 (ref 0.7–3.1)
LYMPHOCYTES NFR BLD AUTO: 45.6 % (ref 19.6–45.3)
MAGNESIUM SERPL-MCNC: 2 MG/DL (ref 1.6–2.6)
MCH RBC QN AUTO: 33.1 PG (ref 26.6–33)
MCHC RBC AUTO-ENTMCNC: 33.2 G/DL (ref 31.5–35.7)
MCV RBC AUTO: 99.7 FL (ref 79–97)
METHADONE UR QL SCN: NEGATIVE
MONOCYTES # BLD AUTO: 0.19 10*3/MM3 (ref 0.1–0.9)
MONOCYTES NFR BLD AUTO: 4.6 % (ref 5–12)
NEUTROPHILS NFR BLD AUTO: 2.03 10*3/MM3 (ref 1.7–7)
NEUTROPHILS NFR BLD AUTO: 48.6 % (ref 42.7–76)
NITRITE UR QL STRIP: NEGATIVE
NRBC BLD AUTO-RTO: 0 /100 WBC (ref 0–0.2)
OPIATES UR QL: NEGATIVE
OXYCODONE UR QL SCN: NEGATIVE
PCP UR QL SCN: NEGATIVE
PH UR STRIP.AUTO: 8.5 [PH] (ref 5–8)
PHOSPHATE SERPL-MCNC: 3.5 MG/DL (ref 2.5–4.5)
PLATELET # BLD AUTO: 277 10*3/MM3 (ref 140–450)
PMV BLD AUTO: 10.5 FL (ref 6–12)
POTASSIUM SERPL-SCNC: 3.9 MMOL/L (ref 3.5–5.2)
PROT SERPL-MCNC: 6.5 G/DL (ref 6–8.5)
PROT UR QL STRIP: ABNORMAL
RBC # BLD AUTO: 3.17 10*6/MM3 (ref 3.77–5.28)
RBC # UR STRIP: ABNORMAL /HPF
REF LAB TEST METHOD: ABNORMAL
SODIUM SERPL-SCNC: 140 MMOL/L (ref 136–145)
SP GR UR STRIP: 1.02 (ref 1–1.03)
SQUAMOUS #/AREA URNS HPF: ABNORMAL /HPF
TRICYCLICS UR QL SCN: NEGATIVE
UROBILINOGEN UR QL STRIP: ABNORMAL
VALPROATE SERPL-MCNC: 48.9 MCG/ML (ref 50–125)
WBC # UR STRIP: ABNORMAL /HPF
WBC NRBC COR # BLD AUTO: 4.17 10*3/MM3 (ref 3.4–10.8)
WHOLE BLOOD HOLD COAG: NORMAL
WHOLE BLOOD HOLD SPECIMEN: NORMAL

## 2025-06-17 PROCEDURE — 93005 ELECTROCARDIOGRAM TRACING: CPT | Performed by: NURSE PRACTITIONER

## 2025-06-17 PROCEDURE — 84100 ASSAY OF PHOSPHORUS: CPT | Performed by: NURSE PRACTITIONER

## 2025-06-17 PROCEDURE — 80307 DRUG TEST PRSMV CHEM ANLYZR: CPT | Performed by: NURSE PRACTITIONER

## 2025-06-17 PROCEDURE — 81001 URINALYSIS AUTO W/SCOPE: CPT | Performed by: NURSE PRACTITIONER

## 2025-06-17 PROCEDURE — 85025 COMPLETE CBC W/AUTO DIFF WBC: CPT | Performed by: NURSE PRACTITIONER

## 2025-06-17 PROCEDURE — 80053 COMPREHEN METABOLIC PANEL: CPT | Performed by: NURSE PRACTITIONER

## 2025-06-17 PROCEDURE — 99283 EMERGENCY DEPT VISIT LOW MDM: CPT | Performed by: EMERGENCY MEDICINE

## 2025-06-17 PROCEDURE — 83735 ASSAY OF MAGNESIUM: CPT | Performed by: NURSE PRACTITIONER

## 2025-06-17 PROCEDURE — 80164 ASSAY DIPROPYLACETIC ACD TOT: CPT | Performed by: NURSE PRACTITIONER

## 2025-06-17 RX ORDER — SODIUM CHLORIDE 0.9 % (FLUSH) 0.9 %
10 SYRINGE (ML) INJECTION AS NEEDED
Status: DISCONTINUED | OUTPATIENT
Start: 2025-06-17 | End: 2025-06-17 | Stop reason: HOSPADM

## 2025-06-17 RX ADMIN — VALPROIC ACID 1000 MG: 250 SOLUTION ORAL at 10:04
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
## 2025-06-19 ENCOUNTER — APPOINTMENT (OUTPATIENT)
Dept: GENERAL RADIOLOGY | Facility: HOSPITAL | Age: 30
End: 2025-06-19
Payer: MEDICARE

## 2025-06-19 ENCOUNTER — HOSPITAL ENCOUNTER (EMERGENCY)
Facility: HOSPITAL | Age: 30
Discharge: OTHER INSTITUTION | End: 2025-06-20
Attending: EMERGENCY MEDICINE
Payer: MEDICARE

## 2025-06-19 ENCOUNTER — APPOINTMENT (OUTPATIENT)
Dept: CT IMAGING | Facility: HOSPITAL | Age: 30
End: 2025-06-19
Payer: MEDICARE

## 2025-06-19 DIAGNOSIS — R56.9 SEIZURE: Primary | ICD-10-CM

## 2025-06-19 DIAGNOSIS — S01.511A LIP LACERATION, INITIAL ENCOUNTER: ICD-10-CM

## 2025-06-19 LAB
ALBUMIN SERPL-MCNC: 4.1 G/DL (ref 3.5–5.2)
ALBUMIN/GLOB SERPL: 1.5 G/DL
ALP SERPL-CCNC: 64 U/L (ref 39–117)
ALT SERPL W P-5'-P-CCNC: 7 U/L (ref 1–33)
ANION GAP SERPL CALCULATED.3IONS-SCNC: 12.5 MMOL/L (ref 5–15)
AST SERPL-CCNC: 9 U/L (ref 1–32)
BASOPHILS # BLD AUTO: 0.01 10*3/MM3 (ref 0–0.2)
BASOPHILS NFR BLD AUTO: 0.2 % (ref 0–1.5)
BILIRUB SERPL-MCNC: <0.2 MG/DL (ref 0–1.2)
BUN SERPL-MCNC: 14 MG/DL (ref 6–20)
BUN/CREAT SERPL: 22.6 (ref 7–25)
CALCIUM SPEC-SCNC: 9.6 MG/DL (ref 8.6–10.5)
CHLORIDE SERPL-SCNC: 102 MMOL/L (ref 98–107)
CO2 SERPL-SCNC: 24.5 MMOL/L (ref 22–29)
CREAT SERPL-MCNC: 0.62 MG/DL (ref 0.57–1)
D-LACTATE SERPL-SCNC: 2.3 MMOL/L (ref 0.5–2)
DEPRECATED RDW RBC AUTO: 45.7 FL (ref 37–54)
EGFRCR SERPLBLD CKD-EPI 2021: 123 ML/MIN/1.73
EOSINOPHIL # BLD AUTO: 0.04 10*3/MM3 (ref 0–0.4)
EOSINOPHIL NFR BLD AUTO: 0.7 % (ref 0.3–6.2)
ERYTHROCYTE [DISTWIDTH] IN BLOOD BY AUTOMATED COUNT: 12.7 % (ref 12.3–15.4)
GLOBULIN UR ELPH-MCNC: 2.7 GM/DL
GLUCOSE SERPL-MCNC: 136 MG/DL (ref 65–99)
HCG SERPL QL: NEGATIVE
HCT VFR BLD AUTO: 31.4 % (ref 34–46.6)
HGB BLD-MCNC: 10.6 G/DL (ref 12–15.9)
HOLD SPECIMEN: NORMAL
IMM GRANULOCYTES # BLD AUTO: 0 10*3/MM3 (ref 0–0.05)
IMM GRANULOCYTES NFR BLD AUTO: 0 % (ref 0–0.5)
LYMPHOCYTES # BLD AUTO: 3.45 10*3/MM3 (ref 0.7–3.1)
LYMPHOCYTES NFR BLD AUTO: 56.5 % (ref 19.6–45.3)
MCH RBC QN AUTO: 33.3 PG (ref 26.6–33)
MCHC RBC AUTO-ENTMCNC: 33.8 G/DL (ref 31.5–35.7)
MCV RBC AUTO: 98.7 FL (ref 79–97)
MONOCYTES # BLD AUTO: 0.24 10*3/MM3 (ref 0.1–0.9)
MONOCYTES NFR BLD AUTO: 3.9 % (ref 5–12)
NEUTROPHILS NFR BLD AUTO: 2.37 10*3/MM3 (ref 1.7–7)
NEUTROPHILS NFR BLD AUTO: 38.7 % (ref 42.7–76)
NRBC BLD AUTO-RTO: 0 /100 WBC (ref 0–0.2)
PLATELET # BLD AUTO: 264 10*3/MM3 (ref 140–450)
PMV BLD AUTO: 9.6 FL (ref 6–12)
POTASSIUM SERPL-SCNC: 3.7 MMOL/L (ref 3.5–5.2)
PROT SERPL-MCNC: 6.8 G/DL (ref 6–8.5)
RBC # BLD AUTO: 3.18 10*6/MM3 (ref 3.77–5.28)
SODIUM SERPL-SCNC: 139 MMOL/L (ref 136–145)
VALPROATE SERPL-MCNC: 141.3 MCG/ML (ref 50–125)
WBC NRBC COR # BLD AUTO: 6.11 10*3/MM3 (ref 3.4–10.8)
WHOLE BLOOD HOLD COAG: NORMAL
WHOLE BLOOD HOLD SPECIMEN: NORMAL

## 2025-06-19 PROCEDURE — 99285 EMERGENCY DEPT VISIT HI MDM: CPT | Performed by: EMERGENCY MEDICINE

## 2025-06-19 PROCEDURE — 25010000002 LEVETRIRACETAM PER 10 MG: Performed by: EMERGENCY MEDICINE

## 2025-06-19 PROCEDURE — 25010000002 LORAZEPAM PER 2 MG: Performed by: EMERGENCY MEDICINE

## 2025-06-19 PROCEDURE — 80164 ASSAY DIPROPYLACETIC ACD TOT: CPT | Performed by: EMERGENCY MEDICINE

## 2025-06-19 PROCEDURE — 80053 COMPREHEN METABOLIC PANEL: CPT | Performed by: EMERGENCY MEDICINE

## 2025-06-19 PROCEDURE — 25810000003 SODIUM CHLORIDE 0.9 % SOLUTION: Performed by: EMERGENCY MEDICINE

## 2025-06-19 PROCEDURE — 96374 THER/PROPH/DIAG INJ IV PUSH: CPT

## 2025-06-19 PROCEDURE — 96375 TX/PRO/DX INJ NEW DRUG ADDON: CPT

## 2025-06-19 PROCEDURE — 72125 CT NECK SPINE W/O DYE: CPT

## 2025-06-19 PROCEDURE — 70450 CT HEAD/BRAIN W/O DYE: CPT

## 2025-06-19 PROCEDURE — 85025 COMPLETE CBC W/AUTO DIFF WBC: CPT | Performed by: EMERGENCY MEDICINE

## 2025-06-19 PROCEDURE — 25010000002 LIDOCAINE 1 % SOLUTION: Performed by: EMERGENCY MEDICINE

## 2025-06-19 PROCEDURE — 71045 X-RAY EXAM CHEST 1 VIEW: CPT

## 2025-06-19 PROCEDURE — 84703 CHORIONIC GONADOTROPIN ASSAY: CPT | Performed by: EMERGENCY MEDICINE

## 2025-06-19 PROCEDURE — 83605 ASSAY OF LACTIC ACID: CPT | Performed by: EMERGENCY MEDICINE

## 2025-06-19 RX ORDER — LEVETIRACETAM 500 MG/5ML
1500 INJECTION, SOLUTION, CONCENTRATE INTRAVENOUS ONCE
Status: COMPLETED | OUTPATIENT
Start: 2025-06-19 | End: 2025-06-19

## 2025-06-19 RX ORDER — LORAZEPAM 2 MG/ML
2 INJECTION INTRAMUSCULAR ONCE
Status: COMPLETED | OUTPATIENT
Start: 2025-06-19 | End: 2025-06-19

## 2025-06-19 RX ORDER — LIDOCAINE HYDROCHLORIDE 10 MG/ML
10 INJECTION, SOLUTION INFILTRATION; PERINEURAL ONCE
Status: COMPLETED | OUTPATIENT
Start: 2025-06-19 | End: 2025-06-19

## 2025-06-19 RX ORDER — SODIUM CHLORIDE 0.9 % (FLUSH) 0.9 %
10 SYRINGE (ML) INJECTION AS NEEDED
Status: DISCONTINUED | OUTPATIENT
Start: 2025-06-19 | End: 2025-06-20 | Stop reason: HOSPADM

## 2025-06-19 RX ADMIN — LORAZEPAM 2 MG: 2 INJECTION INTRAMUSCULAR; INTRAVENOUS at 21:43

## 2025-06-19 RX ADMIN — LEVETIRACETAM 1500 MG: 500 INJECTION, SOLUTION INTRAVENOUS at 21:55

## 2025-06-19 RX ADMIN — LIDOCAINE HYDROCHLORIDE 10 ML: 10 INJECTION, SOLUTION INFILTRATION; PERINEURAL at 23:13

## 2025-06-19 RX ADMIN — SODIUM CHLORIDE 1000 ML: 9 INJECTION, SOLUTION INTRAVENOUS at 21:54

## 2025-06-20 VITALS
HEIGHT: 56 IN | DIASTOLIC BLOOD PRESSURE: 62 MMHG | RESPIRATION RATE: 18 BRPM | BODY MASS INDEX: 23.17 KG/M2 | WEIGHT: 103 LBS | OXYGEN SATURATION: 100 % | SYSTOLIC BLOOD PRESSURE: 93 MMHG | TEMPERATURE: 97 F | HEART RATE: 73 BPM

## 2025-06-20 LAB
BILIRUB UR QL STRIP: NEGATIVE
CLARITY UR: CLEAR
COLOR UR: YELLOW
D-LACTATE SERPL-SCNC: 1 MMOL/L (ref 0.5–2)
GLUCOSE UR STRIP-MCNC: NEGATIVE MG/DL
HGB UR QL STRIP.AUTO: NEGATIVE
KETONES UR QL STRIP: NEGATIVE
LEUKOCYTE ESTERASE UR QL STRIP.AUTO: NEGATIVE
NITRITE UR QL STRIP: NEGATIVE
PH UR STRIP.AUTO: 6.5 [PH] (ref 5–8)
PROT UR QL STRIP: NEGATIVE
SP GR UR STRIP: 1.02 (ref 1–1.03)
UROBILINOGEN UR QL STRIP: NORMAL

## 2025-06-20 PROCEDURE — 81003 URINALYSIS AUTO W/O SCOPE: CPT | Performed by: EMERGENCY MEDICINE

## 2025-06-20 PROCEDURE — 83605 ASSAY OF LACTIC ACID: CPT | Performed by: EMERGENCY MEDICINE

## 2025-06-20 PROCEDURE — C9254 INJECTION, LACOSAMIDE: HCPCS | Performed by: EMERGENCY MEDICINE

## 2025-06-20 PROCEDURE — 25010000002 LACOSAMIDE 200 MG/20ML SOLUTION: Performed by: EMERGENCY MEDICINE

## 2025-06-20 PROCEDURE — 96375 TX/PRO/DX INJ NEW DRUG ADDON: CPT

## 2025-06-20 RX ORDER — LACOSAMIDE 10 MG/ML
300 INJECTION, SOLUTION INTRAVENOUS ONCE
Status: COMPLETED | OUTPATIENT
Start: 2025-06-20 | End: 2025-06-20

## 2025-06-20 RX ORDER — FOSPHENYTOIN SODIUM 50 MG/ML
920 INJECTION, SOLUTION INTRAMUSCULAR; INTRAVENOUS ONCE
Status: DISCONTINUED | OUTPATIENT
Start: 2025-06-20 | End: 2025-06-20 | Stop reason: SDUPTHER

## 2025-06-20 RX ADMIN — LACOSAMIDE 300 MG: 10 INJECTION INTRAVENOUS at 01:42

## 2025-06-20 NOTE — ED PROVIDER NOTES
Laceration Repair    Date/Time: 6/20/2025 2:11 AM    Performed by: Anna Garcias PA-C  Authorized by: Mj Crowley MD    Consent:     Consent obtained:  Verbal    Consent given by:  Patient    Risks, benefits, and alternatives were discussed: yes      Risks discussed:  Infection, pain, poor cosmetic result and poor wound healing  Universal protocol:     Patient identity confirmed:  Verbally with patient  Anesthesia:     Anesthesia method: mental block.  Laceration details:     Location:  Lip    Lip location:  Lower interior lip    Length (cm):  1  Exploration:     Hemostasis achieved with:  Direct pressure    Wound exploration: wound explored through full range of motion      Wound extent: no foreign bodies/material noted and no vascular damage noted    Treatment:     Area cleansed with:  Saline    Irrigation solution:  Sterile saline    Irrigation volume:  10mL    Irrigation method:  Syringe    Debridement:  None    Undermining:  None  Skin repair:     Repair method:  Sutures    Suture size:  5-0    Wound skin closure material used: Vicryl.    Suture technique:  Simple interrupted    Number of sutures:  1  Approximation:     Approximation:  Close  Repair type:     Repair type:  Simple  Post-procedure details:     Dressing:  Open (no dressing)    Procedure completion:  Tolerated well, no immediate complications  Laceration Repair    Date/Time: 6/20/2025 2:13 AM    Performed by: Anna Garcias PA-C  Authorized by: Mj Crowley MD    Consent:     Consent obtained:  Verbal    Consent given by:  Patient    Risks, benefits, and alternatives were discussed: yes      Risks discussed:  Pain, poor cosmetic result, poor wound healing and infection  Universal protocol:     Patient identity confirmed:  Verbally with patient  Anesthesia:     Anesthesia method: mental block.  Laceration details:     Location:  Lip    Lip location:  Lower interior lip    Length (cm):  1  Exploration:     Wound exploration: wound explored  through full range of motion      Wound extent: no foreign bodies/material noted and no vascular damage noted    Treatment:     Area cleansed with:  Saline    Amount of cleaning:  Standard    Irrigation solution:  Sterile saline    Irrigation volume:  10mL    Irrigation method:  Syringe  Skin repair:     Repair method:  Sutures    Suture size:  5-0    Wound skin closure material used: Vicryl.    Suture technique:  Simple interrupted    Number of sutures:  3  Approximation:     Approximation:  Close  Repair type:     Repair type:  Simple  Post-procedure details:     Dressing:  Open (no dressing)    Procedure completion:  Tolerated well, no immediate complications  Laceration Repair    Date/Time: 6/20/2025 2:15 AM    Performed by: Anna Garcias PA-C  Authorized by: Mj Crowley MD    Consent:     Consent obtained:  Verbal    Consent given by:  Patient    Risks, benefits, and alternatives were discussed: yes      Risks discussed:  Infection, pain, poor cosmetic result, poor wound healing and nerve damage  Universal protocol:     Patient identity confirmed:  Verbally with patient  Anesthesia:     Anesthesia method: mental block.  Laceration details:     Location:  Lip    Lip location:  Lower interior lip    Length (cm):  1  Exploration:     Wound exploration: wound explored through full range of motion      Wound extent: no foreign bodies/material noted and no vascular damage noted    Treatment:     Area cleansed with:  Saline    Amount of cleaning:  Standard    Irrigation solution:  Sterile saline    Irrigation volume:  10mL    Irrigation method:  Syringe  Skin repair:     Repair method:  Sutures    Suture size:  5-0    Wound skin closure material used: Vicryl.    Suture technique:  Simple interrupted    Number of sutures:  2  Approximation:     Approximation:  Close  Repair type:     Repair type:  Simple  Post-procedure details:     Dressing:  Open (no dressing)    Procedure completion:  Tolerated well, no  immediate complications      3 lacerations that were close in proximity to inner lower lip not crossing vermillion border were repaired. Mental block performed prior to repairing all 3 to provide local anesthesia to the whole right lower lip. Anesthesia achieved and patient tolerated procedures well.       Anna Garcias, PA-C  06/20/25 0217

## 2025-06-20 NOTE — ED NOTES
Pt left ER in stable condition, alert and active. Pt left by ambulance with [facility], caregiver to follow behind. Pt caregiver had no further questions following transfer details.
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
## 2025-06-26 ENCOUNTER — OFFICE VISIT (OUTPATIENT)
Dept: PSYCHIATRY | Facility: CLINIC | Age: 30
End: 2025-06-26
Payer: MEDICARE

## 2025-06-26 VITALS
BODY MASS INDEX: 23.84 KG/M2 | OXYGEN SATURATION: 99 % | HEIGHT: 56 IN | WEIGHT: 106 LBS | HEART RATE: 88 BPM | DIASTOLIC BLOOD PRESSURE: 64 MMHG | SYSTOLIC BLOOD PRESSURE: 102 MMHG

## 2025-06-26 DIAGNOSIS — F79 INTELLECTUAL DISABILITY: ICD-10-CM

## 2025-06-26 DIAGNOSIS — G47.09 OTHER INSOMNIA: Primary | ICD-10-CM

## 2025-06-26 DIAGNOSIS — F91.9 BEHAVIOR DISTURBANCE: ICD-10-CM

## 2025-06-26 RX ORDER — LEVETIRACETAM 1000 MG/1
TABLET ORAL
COMMUNITY
Start: 2025-06-16

## 2025-06-26 RX ORDER — LEVOTHYROXINE SODIUM 50 UG/1
TABLET ORAL
COMMUNITY
Start: 2025-06-10
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
